# Patient Record
Sex: FEMALE | Race: BLACK OR AFRICAN AMERICAN | URBAN - METROPOLITAN AREA
[De-identification: names, ages, dates, MRNs, and addresses within clinical notes are randomized per-mention and may not be internally consistent; named-entity substitution may affect disease eponyms.]

---

## 2017-04-13 ENCOUNTER — OFFICE VISIT (OUTPATIENT)
Dept: ENDOCRINOLOGY | Age: 32
End: 2017-04-13

## 2017-04-13 VITALS
TEMPERATURE: 113 F | RESPIRATION RATE: 18 BRPM | HEART RATE: 113 BPM | HEIGHT: 64 IN | WEIGHT: 190.3 LBS | DIASTOLIC BLOOD PRESSURE: 90 MMHG | SYSTOLIC BLOOD PRESSURE: 122 MMHG | BODY MASS INDEX: 32.49 KG/M2

## 2017-04-13 DIAGNOSIS — E11.65 UNCONTROLLED TYPE 2 DIABETES MELLITUS WITH HYPERGLYCEMIA, WITH LONG-TERM CURRENT USE OF INSULIN (HCC): ICD-10-CM

## 2017-04-13 DIAGNOSIS — E11.65 TYPE 2 DIABETES MELLITUS WITH HYPERGLYCEMIA, WITH LONG-TERM CURRENT USE OF INSULIN (HCC): Primary | ICD-10-CM

## 2017-04-13 DIAGNOSIS — Z79.4 UNCONTROLLED TYPE 2 DIABETES MELLITUS WITH HYPERGLYCEMIA, WITH LONG-TERM CURRENT USE OF INSULIN (HCC): ICD-10-CM

## 2017-04-13 DIAGNOSIS — Z79.4 TYPE 2 DIABETES MELLITUS WITH HYPERGLYCEMIA, WITH LONG-TERM CURRENT USE OF INSULIN (HCC): Primary | ICD-10-CM

## 2017-04-13 LAB
GLUCOSE POC: 239 MG/DL
HBA1C MFR BLD HPLC: 6.1 %

## 2017-04-13 RX ORDER — INSULIN GLARGINE 100 [IU]/ML
INJECTION, SOLUTION SUBCUTANEOUS
Qty: 15 ML | Refills: 11 | Status: SHIPPED | OUTPATIENT
Start: 2017-04-13 | End: 2018-01-29 | Stop reason: SDUPTHER

## 2017-04-13 RX ORDER — PEN NEEDLE, DIABETIC 29 G X1/2"
NEEDLE, DISPOSABLE MISCELLANEOUS
Qty: 200 PEN NEEDLE | Refills: 11 | Status: SHIPPED | OUTPATIENT
Start: 2017-04-13 | End: 2018-03-22 | Stop reason: SDUPTHER

## 2017-04-13 RX ORDER — INSULIN LISPRO 100 [IU]/ML
INJECTION, SOLUTION INTRAVENOUS; SUBCUTANEOUS
Qty: 15 ML | Refills: 11 | Status: SHIPPED | OUTPATIENT
Start: 2017-04-13 | End: 2018-03-22 | Stop reason: SDUPTHER

## 2017-04-13 RX ORDER — METFORMIN HYDROCHLORIDE 1000 MG/1
TABLET ORAL
Qty: 60 TAB | Refills: 11 | Status: SHIPPED | OUTPATIENT
Start: 2017-04-13 | End: 2018-03-22 | Stop reason: SDUPTHER

## 2017-04-13 NOTE — PATIENT INSTRUCTIONS
Check blood sugars before meals and at bedtime. Maintain the log and bring it all your appointments      No insulin  If glucose is less than 70     If the bedtime sugars are less than 100 ,eat a 15 gm snack.     1800 Kcal diet , low carb , high protein    Avoid juices and sodas     Lantus insulin 30 units at bed time    Novolog or Humalog 6 units before breakfast , 6 units before  lunch and 5 units before dinner only         Additional Novolog or Humalog for high sugars with meals     150-200 mg   Add 2 units     201-250 mg   Add 4  units     251-300 mg   Add 6 units     301-350 mg   Add 8 units     351-400 mg   Add 10 units

## 2017-04-13 NOTE — PROGRESS NOTES
[facility] ENDOCRINOLOGY               Alfonso Greco MD        [address] 78 444 81 66 Fax [phone]  [facility]) [address] [address] [address] GF:[phone] Fax [phone] ( Monday)          Patient Information  Date:4/13/2017  Name : Meryl Hay 33 y.o.     YOB: 1985         Referred by:  Group home       Chief Complaint   Patient presents with    Diabetes       History of Present Illness: Meryl Hay is a 33 y.o. female here for fu of  Type 2 Diabetes Mellitus. No log this time  no hypoglycemia in AM ,   No weight loss instead gained weight   Some of her Psychotropic meds have been changed         She lives in a Group home due to underlying Psychiatric condition     Here with care taker , does not communicate  Monitoring frequency:3 /day         Wt Readings from Last 3 Encounters:   04/13/17 190 lb 4.8 oz (86.3 kg)   11/30/16 179 lb (81.2 kg)   08/31/16 179 lb (81.2 kg)       BP Readings from Last 3 Encounters:   04/13/17 95/74   11/30/16 116/78   08/31/16 118/85           Past Medical History:   Diagnosis Date    Autism     Diabetes ([address]     Intermittent explosive disorder      Current Outpatient Prescriptions   Medication Sig    metFORMIN (GLUCOPHAGE) 1,000 mg tablet TAKE 1 TABLET BY MOUTH IN THE MORNING AND IN THE EVENING WITH FOOD    busPIRone (BUSPAR) 10 mg tablet Take 10 mg by mouth as needed.  polyethylene glycol (MIRALAX) 17 gram packet Take 17 g by mouth daily.  benzoyl peroxide 5 % external liquid Apply  to affected area daily as needed.  minocycline (MINOCIN, DYNACIN) 50 mg capsule Take 50 mg by mouth daily.  ammonium lactate (LAC-HYDRIN) 12 % lotion Apply  to affected area two (2) times a day. rub in to affected area well    QUEtiapine SR (SEROQUEL XR) 300 mg sr tablet Take 400 mg by mouth nightly.  clomiPRAMINE (ANAFRANIL) 75 mg capsule Take 225 mg by mouth nightly.     insulin glargine (LANTUS SOLOSTAR) 100 unit/mL (3 mL) pen Inject 30 units at bed time    insulin lispro (HUMALOG) 100 unit/mL kwikpen Inject 6 units before breakfast , 6 units before lunch and 5 units before dinner only w/ SSI Max units daily: 47    Insulin Needles, Disposable, (BD INSULIN PEN NEEDLE UF ORIG) 29 gauge x 1/2\" ndle Use to inject insulin 4 times daily Dx Code: E11.65    cetirizine (ZYRTEC) 10 mg tablet Take 10 mg by mouth daily.  simvastatin (ZOCOR) 10 mg tablet Take 10 mg by mouth nightly.  divalproex DR (DEPAKOTE) 500 mg tablet Take 500 mg by mouth daily.  naltrexone (DEPADE) 50 mg tablet Take 50 mg by mouth daily.  risperiDONE (RISPERDAL) 3 mg tablet Take 4 mg by mouth two (2) times a day.  medroxyPROGESTERone (DEPO-PROVERA) 150 mg/mL injection 1 mL by IntraMUSCular route every three (3) months.  fluconazole (DIFLUCAN) 150 mg tablet Take 150 mg by mouth every thirty (30) days.  Cholecalciferol, Vitamin D3, (VITAMIN D3) 1,000 unit cap Take 1 Cap by mouth daily.  lubiPROStone (AMITIZA) 24 mcg capsule Take 24 mcg by mouth two (2) times daily (with meals).  clonazePAM (KLONOPIN) 2 mg tablet Take 2 mg by mouth three (3) times daily.  FLUoxetine (PROZAC) 20 mg capsule Take 20 mg by mouth daily. No current facility-administered medications for this visit. Allergies   Allergen Reactions    Other Plant, Animal, Environmental Sneezing         Review of Systems: unable to obtain due to underlying mental status  -     Physical Examination:   Blood pressure 95/74, pulse (!) 113, resp. rate 18, height 5' 4\" (1.626 m), weight 190 lb 4.8 oz (86.3 kg). Estimated body mass index is 32.66 kg/(m^2) as calculated from the following:    Height as of this encounter: 5' 4\" (1.626 m). -   Weight as of this encounter: 190 lb 4.8 oz (86.3 kg).   - General: no distress  - HEENT:  no periorbital edema,   - Neck: supple, no thyromegaly  - Cardiovascular: regular, normal rate, normal S1 and S2,   - Respiratory: clear to auscultation bilaterally  - Gastrointestinal: soft, nontender, nondistended,  BS +  - Neurological:alert and not oriented    Diabetic feet exam ;     H/o partial or complete amputation of foot : NO  H/o previous foot ulceration : NO  H/o pre - ulcerative callus : YES  H/o peripheral neuropathy and callus : No ( does not communicate ,so hard to assess )   H/o poor circulation  : NO  Foot deformity : yes bunion   Callus ,2/16   - Psychiatric: restless  - Skin: color, texture, turgor normal.       Data Reviewed:     [] Glucose records reviewed. [] See glucose records for details (to be scanned). [] A1C  [] Reviewed labs    Lab Results   Component Value Date/Time    Hemoglobin A1c 6.9 05/13/2016 10:54 AM    Hemoglobin A1c 7.4 02/17/2016 04:23 PM    Hemoglobin A1c 8.7 06/12/2015 02:50 PM    Glucose 90 05/13/2016 10:54 AM    Glucose  04/13/2017 11:51 AM    Microalb/Creat ratio (ug/mg creat.) <6.7 05/13/2016 10:54 AM    LDL, calculated 51 05/13/2016 10:54 AM    Creatinine 0.69 05/13/2016 10:54 AM    Hemoglobin A1c, External 8.7 09/24/2015    Hemoglobin A1c, External 8.0 09/22/2015      Lab Results   Component Value Date/Time    GFR est  05/13/2016 10:54 AM    GFR est non- 05/13/2016 10:54 AM    Creatinine 0.69 05/13/2016 10:54 AM    BUN 7 05/13/2016 10:54 AM    Sodium 142 05/13/2016 10:54 AM    Potassium 4.3 05/13/2016 10:54 AM    Chloride 101 05/13/2016 10:54 AM    CO2 17 05/13/2016 10:54 AM        Assessment/Plan:     1. Type 2 diabetes mellitus with hyperglycemia, with long-term current use of insulin ([facility])        1.  Type 2 Diabetes Mellitus   Lab Results   Component Value Date/Time    Hemoglobin A1c 6.9 05/13/2016 10:54 AM    Hemoglobin A1c (POC) 6.1 04/13/2017 11:51 AM    Hemoglobin A1c, External 8.7 09/24/2015    controlled    Metformin  Lantus insulin 30 units at bed time  Novolog or Humalog  with meals , breakfast ,lunch and dinner, only     Written instructions given  Advised to check glucose  4 times daily      2. HTN : was lower, improved with hydration , cannot tolerate ACE    3. Hyperlipidemia : Continue statin. 4.Obesity:Body mass index is 32.66 kg/(m^2). Diet managed by Group home and she is on a low carb diet           There are no Patient Instructions on file for this visit. Follow-up Disposition: Not on File    Thank you for allowing me to participate in the care of this patient.     Stella Green MD

## 2017-04-13 NOTE — MR AVS SNAPSHOT
Visit Information Date & Time Provider Department Dept. Phone Encounter #  
 4/13/2017 11:30 AM Vaishali Wallace MD [facility] Diabetes & Endocrinology [phone] [id number] Follow-up Instructions Return in about 3 months (around 7/13/2017). Upcoming Health Maintenance Date Due  
 EYE EXAM RETINAL OR DILATED Q1 10/24/1993 Pneumococcal 19-64 Medium Risk (1 of 1 - PPSV23) 10/24/2002 DTaP/Tdap/Td series (1 - Tdap) 10/24/2004 PAP AKA CERVICAL CYTOLOGY 10/24/2004 INFLUENZA AGE 9 TO ADULT 8/1/2016 FOOT EXAM Q1 12/29/2016 MICROALBUMIN Q1 5/13/2017 LIPID PANEL Q1 5/13/2017 HEMOGLOBIN A1C Q6M 5/30/2017 Allergies as of 4/13/2017  Review Complete On: 4/13/2017 By: Vaishali Wallace MD  
  
 Severity Noted Reaction Type Reactions Other Plant, Animal, Environmental  06/12/2015    Sneezing Current Immunizations  Never Reviewed No immunizations on file. Not reviewed this visit You Were Diagnosed With   
  
 Codes Comments Type 2 diabetes mellitus with hyperglycemia, with long-term current use of insulin (HCC)    -  Primary ICD-10-CM: E11.65, Z79.4 ICD-9-CM: 250.00, 790.29, V58.67 Vitals BP Pulse Resp Height(growth percentile) Weight(growth percentile) BMI  
 95/74 (BP 1 Location: Left arm, BP Patient Position: Sitting) (!) 113 18 5' 4\" (1.626 m) 190 lb 4.8 oz (86.3 kg) 32.66 kg/m2 OB Status Smoking Status Injection Never Smoker Vitals History BMI and BSA Data Body Mass Index Body Surface Area  
 32.66 kg/m 2 1.97 m 2 Preferred Pharmacy Pharmacy Name Phone [facility] [phone] [phone] Your Updated Medication List  
  
   
This list is accurate as of: 4/13/17 12:04 PM.  Always use your most recent med list.  
  
  
  
  
 AMITIZA 24 mcg capsule Generic drug:  lubiPROStone Take 24 mcg by mouth two (2) times daily (with meals). ammonium lactate 12 % lotion Commonly known as:  LAC-HYDRIN Apply  to affected area two (2) times a day. rub in to affected area well  
  
 benzoyl peroxide 5 % external liquid Apply  to affected area daily as needed. busPIRone 10 mg tablet Commonly known as:  BUSPAR Take 10 mg by mouth as needed. cetirizine 10 mg tablet Commonly known as:  ZYRTEC Take 10 mg by mouth daily. clomiPRAMINE 75 mg capsule Commonly known as:  ANAFRANIL Take 225 mg by mouth nightly. clonazePAM 2 mg tablet Commonly known as:  Strang Fabry Take 2 mg by mouth three (3) times daily. DEPO-PROVERA 150 mg/mL injection Generic drug:  medroxyPROGESTERone 1 mL by IntraMUSCular route every three (3) months. divalproex  mg tablet Commonly known as:  DEPAKOTE Take 500 mg by mouth daily. fluconazole 150 mg tablet Commonly known as:  DIFLUCAN Take 150 mg by mouth every thirty (30) days. FLUoxetine 20 mg capsule Commonly known as:  PROzac Take 20 mg by mouth daily. insulin glargine 100 unit/mL (3 mL) pen Commonly known as:  LANTUS SOLOSTAR Inject 30 units at bed time  
  
 insulin lispro 100 unit/mL kwikpen Commonly known as:  HUMALOG Inject 6 units before breakfast , 6 units before lunch and 5 units before dinner only w/ SSI Max units daily: 47 Insulin Needles (Disposable) 29 gauge x 1/2\" Ndle Commonly known as:  BD INSULIN PEN NEEDLE UF ORIG Use to inject insulin 4 times daily Dx Code: E11.65  
  
 metFORMIN 1,000 mg tablet Commonly known as:  GLUCOPHAGE  
TAKE 1 TABLET BY MOUTH IN THE MORNING AND IN THE EVENING WITH FOOD  
  
 minocycline 50 mg capsule Commonly known as:  Alverna Needs Take 50 mg by mouth daily. naltrexone 50 mg tablet Commonly known as:  DEPADE Take 50 mg by mouth daily. polyethylene glycol 17 gram packet Commonly known as:  Mary Limb Take 17 g by mouth daily. risperiDONE 3 mg tablet Commonly known as:  RisperDAL Take 4 mg by mouth two (2) times a day. SEROquel  mg sr tablet Generic drug:  QUEtiapine SR Take 400 mg by mouth nightly. simvastatin 10 mg tablet Commonly known as:  ZOCOR Take 10 mg by mouth nightly. VITAMIN D3 1,000 unit Cap Generic drug:  cholecalciferol Take 1 Cap by mouth daily. We Performed the Following AMB POC GLUCOSE, QUANTITATIVE, BLOOD [08786 CPT(R)] AMB POC HEMOGLOBIN A1C [85929 CPT(R)] Follow-up Instructions Return in about 3 months (around 7/13/2017). Patient Instructions Check blood sugars before meals and at bedtime. Maintain the log and bring it all your appointments No insulin  If glucose is less than 70 If the bedtime sugars are less than 100 ,eat a 15 gm snack. 1800 Kcal diet , low carb , high protein Avoid juices and sodas Lantus insulin 30 units at bed time Novolog or Humalog 6 units before breakfast , 6 units before  lunch and 5 units before dinner only Additional Novolog or Humalog for high sugars with meals 150-200 mg   Add 2 units 201-250 mg   Add 4  units 251-300 mg   Add 6 units 301-350 mg   Add 8 units 351-400 mg   Add 10 units Introducing hospitals & HEALTH SERVICES! Deon Myers introduces [employer] patient portal. Now you can access parts of your medical record, email your doctor's office, and request medication refills online. 1. In your internet browser, go to [email or web address] 2. Click on the First Time User? Click Here link in the Sign In box. You will see the New Member Sign Up page. 3. Enter your [employer] Access Code exactly as it appears below. You will not need to use this code after youve completed the sign-up process. If you do not sign up before the expiration date, you must request a new code. · [employer] Access Code: [email or web address] Expires: 7/12/2017 12:04 PM 
 
 4. Enter the last four digits of your Social Security Number (xxxx) and Date of Birth (mm/dd/yyyy) as indicated and click Submit. You will be taken to the next sign-up page. 5. Create a [employer] ID. This will be your [employer] login ID and cannot be changed, so think of one that is secure and easy to remember. 6. Create a [employer] password. You can change your password at any time. 7. Enter your Password Reset Question and Answer. This can be used at a later time if you forget your password. 8. Enter your e-mail address. You will receive e-mail notification when new information is available in [address] 9. Click Sign Up. You can now view and download portions of your medical record. 10. Click the Download Summary menu link to download a portable copy of your medical information. If you have questions, please visit the Frequently Asked Questions section of the [employer] website. Remember, [employer] is NOT to be used for urgent needs. For medical emergencies, dial 911. Now available from your iPhone and Android! Please provide this summary of care documentation to your next provider. Your primary care clinician is listed as Emiliano Springer. If you have any questions after today's visit, please call [phone].

## 2017-04-13 NOTE — PROGRESS NOTES
Kimberly Akbar is a [address] jo[place]. female here for   Chief Complaint   Patient presents with    Diabetes       Functional glucose monitor and record keeping system? - yes/no  Eye exam within last year? - yes/no  Foot exam within last year? - yes/no    Lab Results   Component Value Date/Time    Hemoglobin A1c 6.9 05/13/2016 10:54 AM    Hemoglobin A1c (POC) 5.3 11/30/2016 03:14 PM    Hemoglobin A1c, External 8.7 09/24/2015       Wt Readings from Last 3 Encounters:   11/30/16 179 lb (81.2 kg)   08/31/16 179 lb (81.2 kg)   05/20/16 182 lb 6.4 oz (82.7 kg)     Temp Readings from Last 3 Encounters:   08/31/16 97 °F (36.1 °C)   05/20/16 98 °F (36.7 °C) (Oral)   02/19/16 98.3 °F (36.8 °C) (Oral)     BP Readings from Last 3 Encounters:   11/30/16 116/78   08/31/16 118/85   05/20/16 102/78     Pulse Readings from Last 3 Encounters:   11/30/16 96   08/31/16 (!) 113   05/20/16 (!) 101

## 2017-07-13 ENCOUNTER — OFFICE VISIT (OUTPATIENT)
Dept: ENDOCRINOLOGY | Age: 32
End: 2017-07-13

## 2017-07-13 VITALS
TEMPERATURE: 98.2 F | SYSTOLIC BLOOD PRESSURE: 110 MMHG | HEIGHT: 64 IN | HEART RATE: 113 BPM | BODY MASS INDEX: 33.26 KG/M2 | DIASTOLIC BLOOD PRESSURE: 72 MMHG | RESPIRATION RATE: 18 BRPM | WEIGHT: 194.8 LBS

## 2017-07-13 DIAGNOSIS — Z79.4 TYPE 2 DIABETES MELLITUS WITH HYPERGLYCEMIA, WITH LONG-TERM CURRENT USE OF INSULIN (HCC): Primary | ICD-10-CM

## 2017-07-13 DIAGNOSIS — E11.65 TYPE 2 DIABETES MELLITUS WITH HYPERGLYCEMIA, WITH LONG-TERM CURRENT USE OF INSULIN (HCC): Primary | ICD-10-CM

## 2017-07-13 DIAGNOSIS — E78.2 MIXED HYPERLIPIDEMIA: ICD-10-CM

## 2017-07-13 NOTE — PROGRESS NOTES
Hero Phipps MD             Patient Information  Date:7/13/2017  Name : Rosita Baldwin 33 y.o.     YOB: 1985         Referred by:  Group home       Chief Complaint   Patient presents with    Diabetes       History of Present Illness: Rosita Baldwin is a 33 y.o. female here for fu of  Type 2 Diabetes Mellitus. Reviewed log   Hypoglycemia - twice since last visit - no known reason  She was getting Humalog even at bedtime in the past - educated staff      She lives in a Group home due to underlying Psychiatric condition     Here with care taker , does not communicate  Monitoring frequency:3 /day         Wt Readings from Last 3 Encounters:   07/13/17 194 lb 12.8 oz (88.4 kg)   04/13/17 190 lb 4.8 oz (86.3 kg)   11/30/16 179 lb (81.2 kg)       BP Readings from Last 3 Encounters:   07/13/17 110/72   04/13/17 122/90   11/30/16 116/78           Past Medical History:   Diagnosis Date    Autism     Diabetes ([facility] [address]     Intermittent explosive disorder      Current Outpatient Prescriptions   Medication Sig    metFORMIN (GLUCOPHAGE) 1,000 mg tablet TAKE 1 TABLET BY MOUTH IN THE MORNING AND IN THE EVENING WITH FOOD    Insulin Needles, Disposable, (BD INSULIN PEN NEEDLE UF ORIG) 29 gauge x 1/2\" ndle Use to inject insulin 4 times daily Dx Code: E11.65    insulin lispro (HUMALOG) 100 unit/mL kwikpen Inject 6 units before breakfast , 6 units before lunch and 5 units before dinner only w/ SSI Max units daily: 47    insulin glargine (LANTUS SOLOSTAR) 100 unit/mL (3 mL) pen Inject 30 units at bed time    polyethylene glycol (MIRALAX) 17 gram packet Take 17 g by mouth daily.  benzoyl peroxide 5 % external liquid Apply  to affected area daily as needed.  minocycline (MINOCIN, DYNACIN) 50 mg capsule Take 50 mg by mouth daily.  ammonium lactate (LAC-HYDRIN) 12 % lotion Apply  to affected area two (2) times a day.  rub in to affected area well  QUEtiapine SR (SEROQUEL XR) 300 mg sr tablet Take 400 mg by mouth nightly.  clomiPRAMINE (ANAFRANIL) 75 mg capsule Take 225 mg by mouth nightly.  cetirizine (ZYRTEC) 10 mg tablet Take 10 mg by mouth daily.  simvastatin (ZOCOR) 10 mg tablet Take 10 mg by mouth nightly.  divalproex DR (DEPAKOTE) 500 mg tablet Take 500 mg by mouth daily.  naltrexone (DEPADE) 50 mg tablet Take 50 mg by mouth daily.  risperiDONE (RISPERDAL) 3 mg tablet Take 4 mg by mouth two (2) times a day.  medroxyPROGESTERone (DEPO-PROVERA) 150 mg/mL injection 1 mL by IntraMUSCular route every three (3) months.  fluconazole (DIFLUCAN) 150 mg tablet Take 150 mg by mouth every thirty (30) days.  Cholecalciferol, Vitamin D3, (VITAMIN D3) 1,000 unit cap Take 1 Cap by mouth daily.  lubiPROStone (AMITIZA) 24 mcg capsule Take 24 mcg by mouth two (2) times daily (with meals).  busPIRone (BUSPAR) 10 mg tablet Take 10 mg by mouth as needed.  clonazePAM (KLONOPIN) 2 mg tablet Take 2 mg by mouth three (3) times daily.  FLUoxetine (PROZAC) 20 mg capsule Take 20 mg by mouth daily. No current facility-administered medications for this visit. Allergies   Allergen Reactions    Other Plant, Animal, Environmental Sneezing         Review of Systems: unable to obtain due to underlying mental status  -     Physical Examination:   Blood pressure 110/72, pulse (!) 113, temperature 98.2 °F (36.8 °C), temperature source Axillary, resp. rate 18, height 5' 4\" (1.626 m), weight 194 lb 12.8 oz (88.4 kg). Estimated body mass index is 33.44 kg/(m^2) as calculated from the following:    Height as of this encounter: 5' 4\" (1.626 m). -   Weight as of this encounter: 194 lb 12.8 oz (88.4 kg).   - General: no distress  - HEENT:  no periorbital edema,   - Neck: supple, no thyromegaly  - Cardiovascular: regular, normal rate, normal S1 and S2,   - Respiratory: clear to auscultation bilaterally  - Gastrointestinal: soft, nontender, nondistended,  BS +  - Neurological:alert and not oriented    Diabetic feet exam ;     H/o partial or complete amputation of foot : NO  H/o previous foot ulceration : NO  H/o pre - ulcerative callus : YES  H/o peripheral neuropathy and callus : No ( does not communicate ,so hard to assess )   H/o poor circulation  : NO  Foot deformity : yes bunion   Callus    - Psychiatric: restless  - Skin: color, texture, turgor normal.       Data Reviewed:     [] Glucose records reviewed. [] See glucose records for details (to be scanned). [] A1C  [] Reviewed labs    Lab Results   Component Value Date/Time    Hemoglobin A1c 6.9 05/13/2016 10:54 AM    Hemoglobin A1c 7.4 02/17/2016 04:23 PM    Hemoglobin A1c 8.7 06/12/2015 02:50 PM    Glucose 90 05/13/2016 10:54 AM    Glucose  04/13/2017 11:51 AM    Microalb/Creat ratio (ug/mg creat.) <6.7 05/13/2016 10:54 AM    LDL, calculated 51 05/13/2016 10:54 AM    Creatinine 0.69 05/13/2016 10:54 AM    Hemoglobin A1c, External 8.7 09/24/2015    Hemoglobin A1c, External 8.0 09/22/2015      Lab Results   Component Value Date/Time    GFR est  05/13/2016 10:54 AM    GFR est non- 05/13/2016 10:54 AM    Creatinine 0.69 05/13/2016 10:54 AM    BUN 7 05/13/2016 10:54 AM    Sodium 142 05/13/2016 10:54 AM    Potassium 4.3 05/13/2016 10:54 AM    Chloride 101 05/13/2016 10:54 AM    CO2 17 05/13/2016 10:54 AM        Assessment/Plan:     1. Type 2 diabetes mellitus with hyperglycemia, with long-term current use of insulin ([facility] [address]    2. Mixed hyperlipidemia        1. Type 2 Diabetes Mellitus   Lab Results   Component Value Date/Time    Hemoglobin A1c 6.9 05/13/2016 10:54 AM    Hemoglobin A1c (POC) 6.1 04/13/2017 11:51 AM    Hemoglobin A1c, External 8.7 09/24/2015    controlled    Metformin  Lantus insulin 30 units at bed time  Novolog or Humalog  with meals , breakfast ,lunch and dinner, only     Written instructions given  Advised to check glucose  4 times daily      2.   HTN : was lower, improved with hydration , cannot tolerate ACE    3. Hyperlipidemia : Continue statin. 4.Obesity:Body mass index is 33.44 kg/(m^2). Diet managed by Group home and she is on a low carb diet           There are no Patient Instructions on file for this visit. Follow-up Disposition: Not on File    Thank you for allowing me to participate in the care of this patient.     Cesar Schwartz MD

## 2017-07-13 NOTE — PROGRESS NOTES
Leobardo Gibson is a 33 y.o. female here for   Chief Complaint   Patient presents with    Diabetes       Functional glucose monitor and record keeping system? - yes  Eye exam within last year? - unknown   Foot exam within last year? - unknown    1. Have you been to the ER, urgent care clinic since your last visit? Hospitalized since your last visit? - no    2. Have you seen or consulted any other health care providers outside of the [address] [place] [address] since your last visit?   Include any pap smears or colon screening.- PCP      Lab Results   Component Value Date/Time    Hemoglobin A1c 6.9 05/13/2016 10:54 AM    Hemoglobin A1c (POC) 6.1 04/13/2017 11:51 AM    Hemoglobin A1c, External 8.7 09/24/2015       Wt Readings from Last 3 Encounters:   04/13/17 190 lb 4.8 oz (86.3 kg)   11/30/16 179 lb (81.2 kg)   08/31/16 179 lb (81.2 kg)     Temp Readings from Last 3 Encounters:   04/13/17 (!) 113 °F (45 °C)   08/31/16 97 °F (36.1 °C)   05/20/16 98 °F (36.7 °C) (Oral)     BP Readings from Last 3 Encounters:   04/13/17 122/90   11/30/16 116/78   08/31/16 118/85     Pulse Readings from Last 3 Encounters:   04/13/17 (!) 113   11/30/16 96   08/31/16 (!) 113

## 2017-07-13 NOTE — MR AVS SNAPSHOT
Visit Information Date & Time Provider Department Dept. Phone Encounter #  
 7/13/2017 11:30 AM Chana Carranza MD [facility] [phone] [id number] Follow-up Instructions Return in about 3 months (around 10/13/2017). Your Appointments 7/13/2017 11:30 AM  
ROUTINE CARE with Chana Carranza MD  
[facility]) Appt Note: 3mo fu dm  
 [address] [facility] [address]  
[phone]  
  
   
 [address] [place] [id number] Upcoming Health Maintenance Date Due  
 EYE EXAM RETINAL OR DILATED Q1 10/24/1993 Pneumococcal 19-64 Medium Risk (1 of 1 - PPSV23) 10/24/2002 DTaP/Tdap/Td series (1 - Tdap) 10/24/2004 PAP AKA CERVICAL CYTOLOGY 10/24/2004 FOOT EXAM Q1 12/29/2016 MICROALBUMIN Q1 5/13/2017 LIPID PANEL Q1 5/13/2017 INFLUENZA AGE 9 TO ADULT 8/1/2017 HEMOGLOBIN A1C Q6M 10/13/2017 Allergies as of 7/13/2017  Review Complete On: 7/13/2017 By: Chana Carranza MD  
  
 Severity Noted Reaction Type Reactions Other Plant, Animal, Environmental  06/12/2015    Sneezing Current Immunizations  Never Reviewed No immunizations on file. Not reviewed this visit You Were Diagnosed With   
  
 Codes Comments Type 2 diabetes mellitus with hyperglycemia, with long-term current use of insulin (HCC)    -  Primary ICD-10-CM: E11.65, Z79.4 ICD-9-CM: 250.00, 790.29, V58.67 Mixed hyperlipidemia     ICD-10-CM: E78.2 ICD-9-CM: 272.2 Vitals BP Pulse Temp Resp Height(growth percentile) 110/72 (BP 1 Location: Left arm, BP Patient Position: Sitting) (!) 113 98.2 °F (36.8 °C) (Axillary) 18 5' 4\" (1.626 m) Weight(growth percentile) BMI OB Status Smoking Status 194 lb 12.8 oz (88.4 kg) 33.44 kg/m2 Injection Never Smoker BMI and BSA Data Body Mass Index Body Surface Area  
 33.44 kg/m 2 2 m 2 Preferred Pharmacy Pharmacy Name Phone [facility] [address] [phone] Your Updated Medication List  
  
   
This list is accurate as of: 7/13/17 11:22 AM.  Always use your most recent med list.  
  
  
  
  
 AMITIZA 24 mcg capsule Generic drug:  lubiPROStone Take 24 mcg by mouth two (2) times daily (with meals). ammonium lactate 12 % lotion Commonly known as:  LAC-HYDRIN Apply  to affected area two (2) times a day. rub in to affected area well  
  
 benzoyl peroxide 5 % external liquid Apply  to affected area daily as needed. busPIRone 10 mg tablet Commonly known as:  BUSPAR Take 10 mg by mouth as needed. cetirizine 10 mg tablet Commonly known as:  ZYRTEC Take 10 mg by mouth daily. clomiPRAMINE 75 mg capsule Commonly known as:  ANAFRANIL Take 225 mg by mouth nightly. clonazePAM 2 mg tablet Commonly known as:  [place] Panda Take 2 mg by mouth three (3) times daily. DEPO-PROVERA 150 mg/mL injection Generic drug:  medroxyPROGESTERone 1 mL by IntraMUSCular route every three (3) months. divalproex  mg tablet Commonly known as:  DEPAKOTE Take 500 mg by mouth daily. fluconazole 150 mg tablet Commonly known as:  DIFLUCAN Take 150 mg by mouth every thirty (30) days. FLUoxetine 20 mg capsule Commonly known as:  PROzac Take 20 mg by mouth daily. insulin glargine 100 unit/mL (3 mL) Inpn Commonly known as:  LANTUS SOLOSTAR Inject 30 units at bed time  
  
 insulin lispro 100 unit/mL kwikpen Commonly known as:  HUMALOG Inject 6 units before breakfast , 6 units before lunch and 5 units before dinner only w/ SSI Max units daily: 47 Insulin Needles (Disposable) 29 gauge x 1/2\" Ndle Commonly known as:  BD INSULIN PEN NEEDLE UF ORIG Use to inject insulin 4 times daily Dx Code: E11.65  
  
 metFORMIN 1,000 mg tablet Commonly known as:  GLUCOPHAGE  
 TAKE 1 TABLET BY MOUTH IN THE MORNING AND IN THE EVENING WITH FOOD  
  
 minocycline 50 mg capsule Commonly known as:  Stefano Mari Take 50 mg by mouth daily. naltrexone 50 mg tablet Commonly known as:  DEPADE Take 50 mg by mouth daily. polyethylene glycol 17 gram packet Commonly known as:  Vernel Redder Take 17 g by mouth daily. risperiDONE 3 mg tablet Commonly known as:  RisperDAL Take 4 mg by mouth two (2) times a day. SEROquel  mg sr tablet Generic drug:  QUEtiapine SR Take 400 mg by mouth nightly. simvastatin 10 mg tablet Commonly known as:  ZOCOR Take 10 mg by mouth nightly. VITAMIN D3 1,000 unit Cap Generic drug:  cholecalciferol Take 1 Cap by mouth daily. Follow-up Instructions Return in about 3 months (around 10/13/2017). Patient Instructions Check blood sugars before meals and at bedtime. Maintain the log and bring it all your appointments No insulin  If glucose is less than 70 If she has low glucose which is less than 70 - give her 4 oz juice and recheck every 15 minutes until it is more than 100 If the bedtime sugars are less than 100 ,eat a 15 gm snack. 1800 Kcal diet , low carb , high protein Avoid juices and sodas Lantus insulin 30 units at bed time Novolog or Humalog 6 units before breakfast , 6 units before  lunch and 5 units before dinner only No humalog or Novolog at bedtime Additional Novolog or Humalog for high sugars with meals 150-200 mg   Add 2 units 201-250 mg   Add 4  units 251-300 mg   Add 6 units 301-350 mg   Add 8 units 351-400 mg   Add 10 units If sugars are more than 450 then take her to [facility] Introducing [facility]! Maria R Cruz introduces [employer] patient portal. Now you can access parts of your medical record, email your doctor's office, and request medication refills online. 1. In your internet browser, go to [email or web address] 2. Click on the First Time User? Click Here link in the Sign In box. You will see the New Member Sign Up page. 3. Enter your [employer] Access Code exactly as it appears below. You will not need to use this code after youve completed the sign-up process. If you do not sign up before the expiration date, you must request a new code. · [employer] Access Code: [email or web address] Expires: 10/11/2017 11:22 AM 
 
4. Enter the last four digits of your Social Security Number (xxxx) and Date of Birth (mm/dd/yyyy) as indicated and click Submit. You will be taken to the next sign-up page. 5. Create a Moximedt ID. This will be your [employer] login ID and cannot be changed, so think of one that is secure and easy to remember. 6. Create a [employer] password. You can change your password at any time. 7. Enter your Password Reset Question and Answer. This can be used at a later time if you forget your password. 8. Enter your e-mail address. You will receive e-mail notification when new information is available in [address] 9. Click Sign Up. You can now view and download portions of your medical record. 10. Click the Download Summary menu link to download a portable copy of your medical information. If you have questions, please visit the Frequently Asked Questions section of the [employer] website. Remember, [employer] is NOT to be used for urgent needs. For medical emergencies, dial 911. Now available from your iPhone and Android! Please provide this summary of care documentation to your next provider. Your primary care clinician is listed as Monica Contreras. If you have any questions after today's visit, please call [phone].

## 2017-07-13 NOTE — PATIENT INSTRUCTIONS
Check blood sugars before meals and at bedtime. Maintain the log and bring it all your appointments      No insulin  If glucose is less than 70     If she has low glucose which is less than 70 - give her 4 oz juice and recheck every 15 minutes until it is more than 100     If the bedtime sugars are less than 100 ,eat a 15 gm snack.     1800 Kcal diet , low carb , high protein    Avoid juices and sodas     Lantus insulin 30 units at bed time    Novolog or Humalog 6 units before breakfast , 6 units before  lunch and 5 units before dinner only   No humalog or Novolog at bedtime        Additional Novolog or Humalog for high sugars with meals     150-200 mg   Add 2 units     201-250 mg   Add 4  units     251-300 mg   Add 6 units     301-350 mg   Add 8 units     351-400 mg   Add 10 units         If sugars are more than 450 [facility]

## 2017-10-20 ENCOUNTER — TELEPHONE (OUTPATIENT)
Dept: ENDOCRINOLOGY | Age: 32
End: 2017-10-20

## 2017-10-20 NOTE — TELEPHONE ENCOUNTER
Leopold Custard called from group home and stated patient's blood sugars have been better. However, the blood sugar is dropping during the night. Patient is given a snack at 2100 and blood sugar checked and given 30 uits of Lantus. Approximately around 1230 am the staff checks blood sugar and it is ranging from . They give patient another snack. FBS are ranging 70-80 with one dropping below 70- it was 67 on 10/10. FBS this morning was 72. They are asking should changes be made. Please advise.

## 2017-10-20 NOTE — TELEPHONE ENCOUNTER
Spoke with Ms Thuan Maxwell and Verified they are giving Lantus at bedtime and not humalog. She stated it is lantus. Informed her to decrease lantus dose to 24 units at bedtime. Verbalized understanding. Patient has follow up on 10/25 and they will bring blood sugar logs to appointment.

## 2017-10-20 NOTE — TELEPHONE ENCOUNTER
Please make sure they are not giving Humalog at bedtime - they were doing it in the past by accident    Humalog is only before meals      If thye are not giving Humalog ta betdtim freida decraese lantus to 24 units

## 2017-10-25 ENCOUNTER — OFFICE VISIT (OUTPATIENT)
Dept: ENDOCRINOLOGY | Age: 32
End: 2017-10-25

## 2017-10-25 VITALS
OXYGEN SATURATION: 99 % | RESPIRATION RATE: 16 BRPM | WEIGHT: 209.7 LBS | HEART RATE: 75 BPM | SYSTOLIC BLOOD PRESSURE: 98 MMHG | DIASTOLIC BLOOD PRESSURE: 71 MMHG | BODY MASS INDEX: 35.8 KG/M2 | HEIGHT: 64 IN

## 2017-10-25 DIAGNOSIS — E11.42 DIABETIC PERIPHERAL NEUROPATHY (HCC): ICD-10-CM

## 2017-10-25 DIAGNOSIS — E78.2 MIXED HYPERLIPIDEMIA: ICD-10-CM

## 2017-10-25 DIAGNOSIS — E11.65 TYPE 2 DIABETES MELLITUS WITH HYPERGLYCEMIA, WITH LONG-TERM CURRENT USE OF INSULIN (HCC): Primary | ICD-10-CM

## 2017-10-25 DIAGNOSIS — Z79.4 TYPE 2 DIABETES MELLITUS WITH HYPERGLYCEMIA, WITH LONG-TERM CURRENT USE OF INSULIN (HCC): Primary | ICD-10-CM

## 2017-10-25 LAB
GLUCOSE POC: 149 MG/DL
HBA1C MFR BLD HPLC: 7.6 %

## 2017-10-25 RX ORDER — LORAZEPAM 1 MG/1
1 TABLET ORAL 2 TIMES DAILY
COMMUNITY

## 2017-10-25 NOTE — PROGRESS NOTES
Shyam Hewitt is a 27 y.o. female here for   Chief Complaint   Patient presents with    Diabetes       Functional glucose monitor and record keeping system? - yes  Eye exam within last year? - unknown  Foot exam within last year? - unknown    1. Have you been to the ER, urgent care clinic since your last visit? Hospitalized since your last visit? -no    2. Have you seen or consulted any other health care providers outside of the [address] [place] [address] since your last visit? Include any pap smears or colon screening. -PCP      Lab Results   Component Value Date/Time    Hemoglobin A1c 6.9 05/13/2016 10:54 AM    Hemoglobin A1c (POC) 6.1 04/13/2017 11:51 AM    Hemoglobin A1c, External 8.7 09/24/2015       Wt Readings from Last 3 Encounters:   07/13/17 194 lb 12.8 oz (88.4 kg)   04/13/17 190 lb 4.8 oz (86.3 kg)   11/30/16 179 lb (81.2 kg)     Temp Readings from Last 3 Encounters:   07/13/17 98.2 °F (36.8 °C) (Axillary)   04/13/17 (!) 113 °F (45 °C)   08/31/16 97 °F (36.1 °C)     BP Readings from Last 3 Encounters:   07/13/17 110/72   04/13/17 122/90   11/30/16 116/78     Pulse Readings from Last 3 Encounters:   07/13/17 (!) 113   04/13/17 (!) 113   11/30/16 96

## 2017-10-25 NOTE — PROGRESS NOTES
Jaya Valentine MD             Patient Information  Date:10/25/2017  Name : David Ruiz 27 y.o.     YOB: 1985         Referred by:  Group home       Chief Complaint   Patient presents with    Diabetes       History of Present Illness: David Ruiz is a 27 y.o. female here for fu of  Type 2 Diabetes Mellitus. Reviewed log   Hypoglycemia - at bedtime , eats well   Some of the staff are feeding her more carbs and they are spikes some days       She lives in a Group home due to underlying Psychiatric condition     Here with care taker , does not communicate  Monitoring frequency:3 /day         Wt Readings from Last 3 Encounters:   10/25/17 209 lb 11.2 oz (95.1 kg)   07/13/17 194 lb 12.8 oz (88.4 kg)   04/13/17 190 lb 4.8 oz (86.3 kg)       BP Readings from Last 3 Encounters:   10/25/17 98/71   07/13/17 110/72   04/13/17 122/90           Past Medical History:   Diagnosis Date    Autism     [facility]     Intermittent explosive disorder      Current Outpatient Prescriptions   Medication Sig    LORazepam (ATIVAN) 1 mg tablet Take 1 mg by mouth as needed for Anxiety.  metFORMIN (GLUCOPHAGE) 1,000 mg tablet TAKE 1 TABLET BY MOUTH IN THE MORNING AND IN THE EVENING WITH FOOD    Insulin Needles, Disposable, (BD INSULIN PEN NEEDLE UF ORIG) 29 gauge x 1/2\" ndle Use to inject insulin 4 times daily Dx Code: E11.65    insulin lispro (HUMALOG) 100 unit/mL kwikpen Inject 6 units before breakfast , 6 units before lunch and 5 units before dinner only w/ SSI Max units daily: 47    insulin glargine (LANTUS SOLOSTAR) 100 unit/mL (3 mL) pen Inject 30 units at bed time    polyethylene glycol (MIRALAX) 17 gram packet Take 17 g by mouth as needed.  benzoyl peroxide 5 % external liquid Apply  to affected area daily as needed.  ammonium lactate (LAC-HYDRIN) 12 % lotion Apply  to affected area two (2) times a day.  rub in to affected area well    QUEtiapine SR (SEROQUEL XR) 300 mg sr tablet Take 400 mg by mouth nightly.  clomiPRAMINE (ANAFRANIL) 75 mg capsule Take 225 mg by mouth nightly.  cetirizine (ZYRTEC) 10 mg tablet Take 10 mg by mouth daily.  simvastatin (ZOCOR) 10 mg tablet Take 10 mg by mouth nightly.  divalproex DR (DEPAKOTE) 500 mg tablet Take 1,000 mg by mouth daily.  risperiDONE (RISPERDAL) 3 mg tablet Take 4 mg by mouth two (2) times a day.  medroxyPROGESTERone (DEPO-PROVERA) 150 mg/mL injection 1 mL by IntraMUSCular route every three (3) months.  fluconazole (DIFLUCAN) 150 mg tablet Take 150 mg by mouth every thirty (30) days.  Cholecalciferol, Vitamin D3, (VITAMIN D3) 1,000 unit cap Take 1 Cap by mouth daily.  lubiPROStone (AMITIZA) 24 mcg capsule Take 24 mcg by mouth two (2) times daily (with meals).  busPIRone (BUSPAR) 10 mg tablet Take 10 mg by mouth as needed.  minocycline (MINOCIN, DYNACIN) 50 mg capsule Take 50 mg by mouth daily.  clonazePAM (KLONOPIN) 2 mg tablet Take 2 mg by mouth three (3) times daily.  naltrexone (DEPADE) 50 mg tablet Take 50 mg by mouth daily.  FLUoxetine (PROZAC) 20 mg capsule Take 20 mg by mouth daily. No current facility-administered medications for this visit. Allergies   Allergen Reactions    Other Plant, Animal, Environmental Sneezing         Review of Systems: unable to obtain due to underlying mental status  -     Physical Examination:   Blood pressure 98/71, pulse 75, resp. rate 16, height 5' 4\" (1.626 m), weight 209 lb 11.2 oz (95.1 kg), SpO2 99 %. Estimated body mass index is 35.99 kg/(m^2) as calculated from the following:    Height as of this encounter: 5' 4\" (1.626 m). -   Weight as of this encounter: 209 lb 11.2 oz (95.1 kg).   - General: no distress  - HEENT:  no periorbital edema,   - Neck: supple, no thyromegaly  - Cardiovascular: regular, normal rate, normal S1 and S2,   - Respiratory: clear to auscultation bilaterally  - Gastrointestinal: soft, nontender, nondistended,  BS +  - Neurological:alert and not oriented    Diabetic feet exam ;     H/o partial or complete amputation of foot : NO  H/o previous foot ulceration : NO  H/o pre - ulcerative callus : YES  H/o peripheral neuropathy and callus : No ( does not communicate ,so hard to assess )   H/o poor circulation  : NO  Foot deformity : yes bunion   Callus    - Psychiatric: restless  - Skin: color, texture, turgor normal.       Data Reviewed:     [] Glucose records reviewed. [] See glucose records for details (to be scanned). [] A1C  [] Reviewed labs    Lab Results   Component Value Date/Time    Hemoglobin A1c 6.9 05/13/2016 10:54 AM    Hemoglobin A1c 7.4 02/17/2016 04:23 PM    Hemoglobin A1c 8.7 06/12/2015 02:50 PM    Glucose 90 05/13/2016 10:54 AM    Glucose  10/25/2017 11:35 AM    Microalb/Creat ratio (ug/mg creat.) <6.7 05/13/2016 10:54 AM    LDL, calculated 51 05/13/2016 10:54 AM    Creatinine 0.69 05/13/2016 10:54 AM    Hemoglobin A1c, External 8.7 09/24/2015    Hemoglobin A1c, External 8.0 09/22/2015      Lab Results   Component Value Date/Time    GFR est  05/13/2016 10:54 AM    GFR est non- 05/13/2016 10:54 AM    Creatinine 0.69 05/13/2016 10:54 AM    BUN 7 05/13/2016 10:54 AM    Sodium 142 05/13/2016 10:54 AM    Potassium 4.3 05/13/2016 10:54 AM    Chloride 101 05/13/2016 10:54 AM    CO2 17 05/13/2016 10:54 AM        Assessment/Plan:     1. Type 2 diabetes mellitus with hyperglycemia, with long-term current use of insulin ([address]    2. Mixed hyperlipidemia        1.  Type 2 Diabetes Mellitus   Lab Results   Component Value Date/Time    Hemoglobin A1c 6.9 05/13/2016 10:54 AM    Hemoglobin A1c (POC) 7.6 10/25/2017 11:36 AM    Hemoglobin A1c, External 8.7 09/24/2015    controlled    Metformin  Lantus insulin 24 units at bed time  Novolog or Humalog  with meals , breakfast ,lunch and no fixed prandial insulin at dinner    Written instructions given  Advised to check glucose  4 times daily      2. HTN : was lower, improved with hydration , cannot tolerate ACE    3. Hyperlipidemia : Continue statin. 4.Obesity:Body mass index is 35.99 kg/(m^2). Diet managed by Group home and she is on a low carb diet           Patient Instructions   Check blood sugars before meals and at bedtime. Maintain the log and bring it all your appointments      No insulin  If glucose is less than 70     If she has low glucose which is less than 70 - give her 4 oz juice and recheck every 15 minutes until it is more than 100     If the bedtime sugars are less than 100 ,eat a 15 gm snack. 1800 Kcal diet , low carb , high protein    Avoid juices and sodas     Lantus insulin 24 units at bed time, if she is still having low sugars in the middle of the night then we have to decrease 20 units of lantus     Novolog or Humalog 6 units before breakfast , 6 units before  lunch and 0 units before dinner only   No humalog or Novolog at bedtime        Additional Novolog or Humalog for high sugars with meals     150-200 mg   Add 2 units     201-250 mg   Add 4  units     251-300 mg   Add 6 units     301-350 mg   Add 8 units     351-400 mg   Add 10 units         If sugars are more than 450 then take her to [facility]    Follow-up Disposition:  Return in about 3 months (around 1/25/2018). Thank you for allowing me to participate in the care of this patient.     Celine Moctezuma MD

## 2017-10-25 NOTE — MR AVS SNAPSHOT
Visit Information Date & Time Provider Department Dept. Phone Encounter #  
 10/25/2017 11:30 AM Derrick Adair MD [facility] [phone] [id number] Follow-up Instructions Return in about 3 months (around 1/25/2018). Upcoming Health Maintenance Date Due  
 EYE EXAM RETINAL OR DILATED Q1 10/24/1993 Pneumococcal 19-64 Medium Risk (1 of 1 - PPSV23) 10/24/2002 DTaP/Tdap/Td series (1 - Tdap) 10/24/2004 PAP AKA CERVICAL CYTOLOGY 10/24/2004 FOOT EXAM Q1 12/29/2016 MICROALBUMIN Q1 5/13/2017 LIPID PANEL Q1 5/13/2017 INFLUENZA AGE 9 TO ADULT 8/1/2017 HEMOGLOBIN A1C Q6M 10/13/2017 Allergies as of 10/25/2017  Review Complete On: 10/25/2017 By: Amelia Lyons LPN Severity Noted Reaction Type Reactions Other Plant, Animal, Environmental  06/12/2015    Sneezing Current Immunizations  Never Reviewed No immunizations on file. Not reviewed this visit You Were Diagnosed With   
  
 Codes Comments Type 2 diabetes mellitus with hyperglycemia, with long-term current use of insulin (HCC)    -  Primary ICD-10-CM: E11.65, Z79.4 ICD-9-CM: 250.00, 790.29, V58.67 Mixed hyperlipidemia     ICD-10-CM: E78.2 ICD-9-CM: 272.2 Vitals BP Pulse Resp Height(growth percentile) Weight(growth percentile) SpO2  
 98/71 (BP 1 Location: Right arm, BP Patient Position: Sitting) 75 16 5' 4\" (1.626 m) 209 lb 11.2 oz (95.1 kg) 99% BMI OB Status Smoking Status 35.99 kg/m2 Injection Never Smoker Vitals History BMI and BSA Data Body Mass Index Body Surface Area 35.99 kg/m 2 2.07 m 2 Preferred Pharmacy Pharmacy Name Phone [facility] [place] [address] [phone] Your Updated Medication List  
  
   
This list is accurate as of: 10/25/17 12:03 PM.  Always use your most recent med list.  
  
  
  
  
 AMITIZA 24 mcg capsule Generic drug:  lubiPROStone Take 24 mcg by mouth two (2) times daily (with meals). ammonium lactate 12 % lotion Commonly known as:  LAC-HYDRIN Apply  to affected area two (2) times a day. rub in to affected area well  
  
 benzoyl peroxide 5 % external liquid Apply  to affected area daily as needed. busPIRone 10 mg tablet Commonly known as:  BUSPAR Take 10 mg by mouth as needed. cetirizine 10 mg tablet Commonly known as:  ZYRTEC Take 10 mg by mouth daily. clomiPRAMINE 75 mg capsule Commonly known as:  ANAFRANIL Take 225 mg by mouth nightly. clonazePAM 2 mg tablet Commonly known as:  Toney Morales Take 2 mg by mouth three (3) times daily. DEPO-PROVERA 150 mg/mL injection Generic drug:  medroxyPROGESTERone 1 mL by IntraMUSCular route every three (3) months. divalproex  mg tablet Commonly known as:  DEPAKOTE Take 1,000 mg by mouth daily. fluconazole 150 mg tablet Commonly known as:  DIFLUCAN Take 150 mg by mouth every thirty (30) days. FLUoxetine 20 mg capsule Commonly known as:  PROzac Take 20 mg by mouth daily. insulin glargine 100 unit/mL (3 mL) Inpn Commonly known as:  LANTUS SOLOSTAR Inject 30 units at bed time  
  
 insulin lispro 100 unit/mL kwikpen Commonly known as:  HUMALOG Inject 6 units before breakfast , 6 units before lunch and 5 units before dinner only w/ SSI Max units daily: 47 Insulin Needles (Disposable) 29 gauge x 1/2\" Ndle Commonly known as:  BD INSULIN PEN NEEDLE UF ORIG Use to inject insulin 4 times daily Dx Code: E11.65 LORazepam 1 mg tablet Commonly known as:  ATIVAN Take 1 mg by mouth as needed for Anxiety. metFORMIN 1,000 mg tablet Commonly known as:  GLUCOPHAGE  
TAKE 1 TABLET BY MOUTH IN THE MORNING AND IN THE EVENING WITH FOOD  
  
 minocycline 50 mg capsule Commonly known as:  Eugene Prakash Take 50 mg by mouth daily. naltrexone 50 mg tablet Commonly known as:  DEPADE Take 50 mg by mouth daily. polyethylene glycol 17 gram packet Commonly known as:  Duane Lebeau Take 17 g by mouth as needed. risperiDONE 3 mg tablet Commonly known as:  RisperDAL Take 4 mg by mouth two (2) times a day. SEROquel  mg sr tablet Generic drug:  QUEtiapine SR Take 400 mg by mouth nightly. simvastatin 10 mg tablet Commonly known as:  ZOCOR Take 10 mg by mouth nightly. VITAMIN D3 1,000 unit Cap Generic drug:  cholecalciferol Take 1 Cap by mouth daily. We Performed the Following AMB POC GLUCOSE, QUANTITATIVE, BLOOD [82716 CPT(R)] AMB POC HEMOGLOBIN A1C [73492 CPT(R)] Follow-up Instructions Return in about 3 months (around 1/25/2018). Patient Instructions Check blood sugars before meals and at bedtime. Maintain the log and bring it all your appointments No insulin  If glucose is less than 70 If she has low glucose which is less than 70 - give her 4 oz juice and recheck every 15 minutes until it is more than 100 If the bedtime sugars are less than 100 ,eat a 15 gm snack. 1800 Kcal diet , low carb , high protein Avoid juices and sodas Lantus insulin 24 units at bed time, if she is still having low sugars in the middle of the night then we have to decrease 20 units of lantus Novolog or Humalog 6 units before breakfast , 6 units before  lunch and 0 units before dinner only No humalog or Novolog at bedtime Additional Novolog or Humalog for high sugars with meals 150-200 mg   Add 2 units 201-250 mg   Add 4  units 251-300 mg   Add 6 units 301-350 mg   Add 8 units 351-400 mg   Add 10 units If sugars are more than 450 then take her to [facility] Introducing [facility]!    
 [facility] introduces Backblaze patient portal. Now you can access parts of your medical record, email your doctor's office, and request medication refills online. 1. In your internet browser, go to [email or web address] 2. Click on the First Time User? Click Here link in the Sign In box. You will see the New Member Sign Up page. 3. Enter your CompareAway Access Code exactly as it appears below. You will not need to use this code after youve completed the sign-up process. If you do not sign up before the expiration date, you must request a new code. · CompareAway Access Code: [email or web address] Expires: 1/23/2018 12:03 PM 
 
4. Enter the last four digits of your Social Security Number (xxxx) and Date of Birth (mm/dd/yyyy) as indicated and click Submit. You will be taken to the next sign-up page. 5. Create a CompareAway ID. This will be your CompareAway login ID and cannot be changed, so think of one that is secure and easy to remember. 6. Create a CompareAway password. You can change your password at any time. 7. Enter your Password Reset Question and Answer. This can be used at a later time if you forget your password. 8. Enter your e-mail address. You will receive e-mail notification when new information is available in [address] 9. Click Sign Up. You can now view and download portions of your medical record. 10. Click the Download Summary menu link to download a portable copy of your medical information. If you have questions, please visit the Frequently Asked Questions section of the CompareAway website. Remember, CompareAway is NOT to be used for urgent needs. For medical emergencies, dial 911. Now available from your iPhone and Android! Please provide this summary of care documentation to your next provider. Your primary care clinician is listed as Ericka Pettit. If you have any questions after today's visit, please call [phone].

## 2017-10-25 NOTE — PATIENT INSTRUCTIONS
Check blood sugars before meals and at bedtime. Maintain the log and bring it all your appointments      No insulin  If glucose is less than 70     If she has low glucose which is less than 70 - give her 4 oz juice and recheck every 15 minutes until it is more than 100     If the bedtime sugars are less than 100 ,eat a 15 gm snack.     1800 Kcal diet , low carb , high protein    Avoid juices and sodas     Lantus insulin 24 units at bed time, if she is still having low sugars in the middle of the night then we have to decrease 20 units of lantus     Novolog or Humalog 6 units before breakfast , 6 units before  lunch and 0 units before dinner only   No humalog or Novolog at bedtime        Additional Novolog or Humalog for high sugars with meals     150-200 mg   Add 2 units     201-250 mg   Add 4  units     251-300 mg   Add 6 units     301-350 mg   Add 8 units     351-400 mg   Add 10 units         If sugars are more than 450 then take her to [facility]

## 2017-12-16 LAB
CREATININE, EXTERNAL: 0.71
HBA1C MFR BLD HPLC: 7.5 %
LDL-C, EXTERNAL: 67
MICROALBUMIN UR TEST STR-MCNC: 11 MG/DL

## 2018-01-03 ENCOUNTER — TELEPHONE (OUTPATIENT)
Dept: ENDOCRINOLOGY | Age: 33
End: 2018-01-03

## 2018-01-03 NOTE — TELEPHONE ENCOUNTER
Asked to Damien Rose regarding Ms Rosario's BG. Miles Delay was not available. Call back information given to aide.

## 2018-01-03 NOTE — TELEPHONE ENCOUNTER
----- Message from Geri Pierson sent at 1/3/2018 12:57 PM EST -----  Regarding: Dr. Nova Nicole  Pt's caregiver, Muna Lund, requesting to speak to nurse in regards to concerns with pt's blood sugar levels. In addition, pt's caregiver is aware of the pt's upcoming appts but would still like to speak to the nurse.      Best contact number: [phone]

## 2018-01-19 ENCOUNTER — HOSPITAL ENCOUNTER (OUTPATIENT)
Dept: LAB | Age: 33
Discharge: HOME | End: 2018-01-19
Payer: MEDICARE

## 2018-01-19 PROCEDURE — 36415 COLL VENOUS BLD VENIPUNCTURE: CPT

## 2018-01-19 PROCEDURE — 80053 COMPREHEN METABOLIC PANEL: CPT

## 2018-01-19 PROCEDURE — 80061 LIPID PANEL: CPT

## 2018-01-19 PROCEDURE — 83036 HEMOGLOBIN GLYCOSYLATED A1C: CPT

## 2018-01-29 ENCOUNTER — OFFICE VISIT (OUTPATIENT)
Dept: ENDOCRINOLOGY | Age: 33
End: 2018-01-29

## 2018-01-29 VITALS
HEIGHT: 64 IN | DIASTOLIC BLOOD PRESSURE: 72 MMHG | BODY MASS INDEX: 34.71 KG/M2 | SYSTOLIC BLOOD PRESSURE: 111 MMHG | HEART RATE: 106 BPM | RESPIRATION RATE: 16 BRPM | TEMPERATURE: 96.9 F | WEIGHT: 203.3 LBS | OXYGEN SATURATION: 99 %

## 2018-01-29 DIAGNOSIS — E66.9 NON MORBID OBESITY: ICD-10-CM

## 2018-01-29 DIAGNOSIS — E78.2 MIXED HYPERLIPIDEMIA: ICD-10-CM

## 2018-01-29 DIAGNOSIS — Z79.4 UNCONTROLLED TYPE 2 DIABETES MELLITUS WITH HYPERGLYCEMIA, WITH LONG-TERM CURRENT USE OF INSULIN (HCC): ICD-10-CM

## 2018-01-29 DIAGNOSIS — Z79.4 TYPE 2 DIABETES MELLITUS WITH HYPERGLYCEMIA, WITH LONG-TERM CURRENT USE OF INSULIN (HCC): Primary | ICD-10-CM

## 2018-01-29 DIAGNOSIS — E11.65 UNCONTROLLED TYPE 2 DIABETES MELLITUS WITH HYPERGLYCEMIA, WITH LONG-TERM CURRENT USE OF INSULIN (HCC): ICD-10-CM

## 2018-01-29 DIAGNOSIS — E11.65 TYPE 2 DIABETES MELLITUS WITH HYPERGLYCEMIA, WITH LONG-TERM CURRENT USE OF INSULIN (HCC): Primary | ICD-10-CM

## 2018-01-29 RX ORDER — INSULIN GLARGINE 100 [IU]/ML
INJECTION, SOLUTION SUBCUTANEOUS
Qty: 15 ML | Refills: 11 | Status: SHIPPED | OUTPATIENT
Start: 2018-01-29 | End: 2018-10-10 | Stop reason: SDUPTHER

## 2018-01-29 NOTE — PROGRESS NOTES
Oswaldo Knox is a 27 y.o. female here for   Chief Complaint   Patient presents with    Diabetes       Functional glucose monitor and record keeping system? - yes  Eye exam within last year? - no, need referral    1. Have you been to the ER, urgent care clinic since your last visit? Hospitalized since your last visit? -no    2. Have you seen or consulted any other health care providers outside of the [facility] since your last visit?   Include any pap smears or colon screening.-PCP, Dec 1st 2017      Lab Results   Component Value Date/Time    Hemoglobin A1c 6.9 05/13/2016 10:54 AM    Hemoglobin A1c (POC) 7.6 10/25/2017 11:36 AM    Hemoglobin A1c, External 8.7 09/24/2015       Wt Readings from Last 3 Encounters:   10/25/17 209 lb 11.2 oz (95.1 kg)   07/13/17 194 lb 12.8 oz (88.4 kg)   04/13/17 190 lb 4.8 oz (86.3 kg)     Temp Readings from Last 3 Encounters:   07/13/17 98.2 °F (36.8 °C) (Axillary)   04/13/17 (!) 113 °F (45 °C)   08/31/16 97 °F (36.1 °C)     BP Readings from Last 3 Encounters:   10/25/17 98/71   07/13/17 110/72   04/13/17 122/90     Pulse Readings from Last 3 Encounters:   10/25/17 75   07/13/17 (!) 113   04/13/17 (!) 113

## 2018-01-29 NOTE — MR AVS SNAPSHOT
[address] [place] [address] 
[phone] Patient: Young Yan MRN: [id number] :[id number] Visit Information Date & Time Provider Department Dept. Phone Encounter #  
 2018  2:30 PM Samia Bishop MD [facility] [phone] [id number] Follow-up Instructions Return in about 4 months (around 2018). Upcoming Health Maintenance Date Due  
 EYE EXAM RETINAL OR DILATED Q1 10/24/1993 Pneumococcal 19-64 Medium Risk (1 of 1 - PPSV23) 10/24/2002 DTaP/Tdap/Td series (1 - Tdap) 10/24/2004 PAP AKA CERVICAL CYTOLOGY 10/24/2004 FOOT EXAM Q1 2016 MICROALBUMIN Q1 2017 LIPID PANEL Q1 2017 Influenza Age 5 to Adult 2017 HEMOGLOBIN A1C Q6M 2018 Allergies as of 2018  Review Complete On: 2018 By: Samia Bishop MD  
  
 Severity Noted Reaction Type Reactions Other Plant, Animal, Environmental  2015    Sneezing Current Immunizations  Never Reviewed No immunizations on file. Not reviewed this visit You Were Diagnosed With   
  
 Codes Comments Type 2 diabetes mellitus with hyperglycemia, with long-term current use of insulin (HCC)    -  Primary ICD-10-CM: E11.65, Z79.4 ICD-9-CM: 250.00, 790.29, V58.67 Mixed hyperlipidemia     ICD-10-CM: E78.2 ICD-9-CM: 272.2 Non morbid obesity     ICD-10-CM: E66.9 ICD-9-CM: 278.00 Vitals BP Pulse Temp Resp Height(growth percentile) Weight(growth percentile) 111/72 (BP 1 Location: Left arm, BP Patient Position: Sitting) (!) 106 96.9 °F (36.1 °C) (Oral) 16 5' 4\" (1.626 m) 203 lb 4.8 oz (92.2 kg) SpO2 BMI OB Status Smoking Status 99% 34.9 kg/m2 Injection Never Smoker Vitals History BMI and BSA Data Body Mass Index Body Surface Area 34.9 kg/m 2 2.04 m 2 Preferred Pharmacy Pharmacy Name Phone [facility] [address] [phone] Your Updated Medication List  
  
   
This list is accurate as of: 1/29/18  3:25 PM.  Always use your most recent med list.  
  
  
  
  
 AMITIZA 24 mcg capsule Generic drug:  lubiPROStone Take 24 mcg by mouth two (2) times daily (with meals). ammonium lactate 12 % lotion Commonly known as:  LAC-HYDRIN Apply  to affected area two (2) times a day. rub in to affected area well  
  
 benzoyl peroxide 5 % external liquid Apply  to affected area daily as needed. busPIRone 10 mg tablet Commonly known as:  BUSPAR Take 10 mg by mouth as needed. cetirizine 10 mg tablet Commonly known as:  ZYRTEC Take 10 mg by mouth daily. clomiPRAMINE 75 mg capsule Commonly known as:  ANAFRANIL Take 225 mg by mouth nightly. clonazePAM 2 mg tablet Commonly known as:  Tiney Player Take 2 mg by mouth three (3) times daily. DEPO-PROVERA 150 mg/mL injection Generic drug:  medroxyPROGESTERone 1 mL by IntraMUSCular route every three (3) months. divalproex  mg tablet Commonly known as:  DEPAKOTE Take 1,000 mg by mouth daily. fluconazole 150 mg tablet Commonly known as:  DIFLUCAN Take 150 mg by mouth every thirty (30) days. FLUoxetine 20 mg capsule Commonly known as:  PROzac Take 20 mg by mouth daily. insulin glargine 100 unit/mL (3 mL) Inpn Commonly known as:  LANTUS SOLOSTAR Inject 30 units at bed time  
  
 insulin lispro 100 unit/mL kwikpen Commonly known as:  HUMALOG Inject 6 units before breakfast , 6 units before lunch and 5 units before dinner only w/ SSI Max units daily: 47 Insulin Needles (Disposable) 29 gauge x 1/2\" Ndle Commonly known as:  BD INSULIN PEN NEEDLE UF ORIG Use to inject insulin 4 times daily Dx Code: E11.65 LORazepam 1 mg tablet Commonly known as:  ATIVAN Take 1 mg by mouth as needed for Anxiety. metFORMIN 1,000 mg tablet Commonly known as:  GLUCOPHAGE  
TAKE 1 TABLET BY MOUTH IN THE MORNING AND IN THE EVENING WITH FOOD  
  
 minocycline 50 mg capsule Commonly known as:  Skye Reid Take 50 mg by mouth daily. naltrexone 50 mg tablet Commonly known as:  DEPADE Take 50 mg by mouth daily. polyethylene glycol 17 gram packet Commonly known as:  Philippe Baxter Take 17 g by mouth as needed. risperiDONE 3 mg tablet Commonly known as:  RisperDAL Take 4 mg by mouth two (2) times a day. SEROquel  mg sr tablet Generic drug:  QUEtiapine SR Take 400 mg by mouth nightly. simvastatin 10 mg tablet Commonly known as:  ZOCOR Take 10 mg by mouth nightly. VITAMIN D3 1,000 unit Cap Generic drug:  cholecalciferol Take 1 Cap by mouth daily. Follow-up Instructions Return in about 4 months (around 5/29/2018). Patient Instructions Check blood sugars before meals and at bedtime. Maintain the log and bring it all your appointments No insulin  If glucose is less than 70 If she has low glucose which is less than 70 - give her 4 oz juice and recheck every 15 minutes until it is more than 100 If the bedtime sugars are less than 100 ,eat a 15 gm snack. 1800 Kcal diet , low carb , high protein Avoid juices and sodas Lantus insulin 24 units at bed time Novolog or Humalog 6 units before breakfast , 6 units before  lunch and 0 units before dinner only No humalog or Novolog at bedtime Additional Novolog or Humalog for high sugars with meals 150-200 mg   Add 2 units 201-250 mg   Add 4  units 251-300 mg   Add 6 units 301-350 mg   Add 8 units 351-400 mg   Add 10 units If sugars are more than 450 then take her to [facility] Introducing [facility]!    
 [facility] introduces [employer] patient portal. Now you can access parts of your medical record, email your doctor's office, and request medication refills online. 1. In your internet browser, go to [email or web address] 2. Click on the First Time User? Click Here link in the Sign In box. You will see the New Member Sign Up page. 3. Enter your OnBeep Access Code exactly as it appears below. You will not need to use this code after youve completed the sign-up process. If you do not sign up before the expiration date, you must request a new code. · OnBeep Access Code: [email or web address] Expires: 4/29/2018  3:24 PM 
 
4. Enter the last four digits of your Social Security Number (xxxx) and Date of Birth (mm/dd/yyyy) as indicated and click Submit. You will be taken to the next sign-up page. 5. Create a OnBeep ID. This will be your OnBeep login ID and cannot be changed, so think of one that is secure and easy to remember. 6. Create a OnBeep password. You can change your password at any time. 7. Enter your Password Reset Question and Answer. This can be used at a later time if you forget your password. 8. Enter your e-mail address. You will receive e-mail notification when new information is available in [address] 9. Click Sign Up. You can now view and download portions of your medical record. 10. Click the Download Summary menu link to download a portable copy of your medical information. If you have questions, please visit the Frequently Asked Questions section of the OnBeep website. Remember, OnBeep is NOT to be used for urgent needs. For medical emergencies, dial 911. Now available from your iPhone and Android! Please provide this summary of care documentation to your next provider. Your primary care clinician is listed as Jess Fuller. If you have any questions after today's visit, please call [phone].

## 2018-01-29 NOTE — PATIENT INSTRUCTIONS
Check blood sugars before meals and at bedtime. Maintain the log and bring it all your appointments      No insulin  If glucose is less than 70     If she has low glucose which is less than 70 - give her 4 oz juice and recheck every 15 minutes until it is more than 100     If the bedtime sugars are less than 100 ,eat a 15 gm snack.     1800 Kcal diet , low carb , high protein    Avoid juices and sodas     Lantus insulin 24 units at bed time     Novolog or Humalog 6 units before breakfast , 6 units before  lunch and 0 units before dinner only   No humalog or Novolog at bedtime        Additional Novolog or Humalog for high sugars with meals     150-200 mg   Add 2 units     201-250 mg   Add 4  units     251-300 mg   Add 6 units     301-350 mg   Add 8 units     351-400 mg   Add 10 units         If sugars are more than 450 [facility]

## 2018-01-29 NOTE — PROGRESS NOTES
Hua Lance MD             Patient Information  Date:1/29/2018  Name : Bhavani Clarke 27 y.o.     YOB: 1985         Referred by:  Group home       Chief Complaint   Patient presents with    Diabetes       History of Present Illness: Bhavani Clarke is a 27 y.o. female here for fu of  Type 2 Diabetes Mellitus. Group home forgot to bring log   No severe hypoglycemia , on a reduced dose of lantus    was hospitalized for hypernatremia  Hx Hypoglycemia - at bedtime , eats well, so stopped evening Humalog , no s/o gastroparesis    She lives in a Group home due to underlying Psychiatric condition     Here with care taker , does not communicate  Monitoring frequency:3 /day         Wt Readings from Last 3 Encounters:   01/29/18 203 lb 4.8 oz (92.2 kg)   10/25/17 209 lb 11.2 oz (95.1 kg)   07/13/17 194 lb 12.8 oz (88.4 kg)       BP Readings from Last 3 Encounters:   01/29/18 111/72   10/25/17 98/71   07/13/17 110/72           Past Medical History:   Diagnosis Date    Autism     Diabetes ([facility] [address]     Intermittent explosive disorder      Current Outpatient Prescriptions   Medication Sig    LORazepam (ATIVAN) 1 mg tablet Take 1 mg by mouth as needed for Anxiety.     metFORMIN (GLUCOPHAGE) 1,000 mg tablet TAKE 1 TABLET BY MOUTH IN THE MORNING AND IN THE EVENING WITH FOOD    Insulin Needles, Disposable, (BD INSULIN PEN NEEDLE UF ORIG) 29 gauge x 1/2\" ndle Use to inject insulin 4 times daily Dx Code: E11.65    insulin lispro (HUMALOG) 100 unit/mL kwikpen Inject 6 units before breakfast , 6 units before lunch and 5 units before dinner only w/ SSI Max units daily: 47 (Patient taking differently: Inject 6 units before breakfast , 6 units before lunch and 0 units before dinner only w/ SSI Max units daily: 47)    insulin glargine (LANTUS SOLOSTAR) 100 unit/mL (3 mL) pen Inject 30 units at bed time (Patient taking differently: Inject 24 units at bed time)    polyethylene glycol (MIRALAX) 17 gram packet Take 17 g by mouth as needed.  benzoyl peroxide 5 % external liquid Apply  to affected area daily as needed.  ammonium lactate (LAC-HYDRIN) 12 % lotion Apply  to affected area two (2) times a day. rub in to affected area well    QUEtiapine SR (SEROQUEL XR) 300 mg sr tablet Take 400 mg by mouth nightly.  clomiPRAMINE (ANAFRANIL) 75 mg capsule Take 225 mg by mouth nightly.  cetirizine (ZYRTEC) 10 mg tablet Take 10 mg by mouth daily.  simvastatin (ZOCOR) 10 mg tablet Take 10 mg by mouth nightly.  divalproex DR (DEPAKOTE) 500 mg tablet Take 1,000 mg by mouth daily.  risperiDONE (RISPERDAL) 3 mg tablet Take 4 mg by mouth two (2) times a day.  medroxyPROGESTERone (DEPO-PROVERA) 150 mg/mL injection 1 mL by IntraMUSCular route every three (3) months.  fluconazole (DIFLUCAN) 150 mg tablet Take 150 mg by mouth every thirty (30) days.  Cholecalciferol, Vitamin D3, (VITAMIN D3) 1,000 unit cap Take 1 Cap by mouth daily.  lubiPROStone (AMITIZA) 24 mcg capsule Take 24 mcg by mouth two (2) times daily (with meals).  busPIRone (BUSPAR) 10 mg tablet Take 10 mg by mouth as needed.  minocycline (MINOCIN, DYNACIN) 50 mg capsule Take 50 mg by mouth daily.  clonazePAM (KLONOPIN) 2 mg tablet Take 2 mg by mouth three (3) times daily.  naltrexone (DEPADE) 50 mg tablet Take 50 mg by mouth daily.  FLUoxetine (PROZAC) 20 mg capsule Take 20 mg by mouth daily. No current facility-administered medications for this visit. Allergies   Allergen Reactions    Other Plant, Animal, Environmental Sneezing         Review of Systems: unable to obtain due to underlying mental status  -     Physical Examination:   Blood pressure 111/72, pulse (!) 106, temperature 96.9 °F (36.1 °C), temperature source Oral, resp. rate 16, height 5' 4\" (1.626 m), weight 203 lb 4.8 oz (92.2 kg), SpO2 99 %.  Estimated body mass index is 34.9 kg/(m^2) as calculated from the following:    Height as of this encounter: 5' 4\" (1.626 m). -   Weight as of this encounter: 203 lb 4.8 oz (92.2 kg). - General: no distress  - HEENT:  no periorbital edema,   - Neck: supple, no thyromegaly  - Cardiovascular: regular, normal rate, normal S1 and S2,   - Respiratory: clear to auscultation bilaterally  - Gastrointestinal: soft, nontender, nondistended,  BS +  - Neurological:alert and not oriented    Diabetic feet exam ;     H/o partial or complete amputation of foot : NO  H/o previous foot ulceration : NO  H/o pre - ulcerative callus : YES  H/o peripheral neuropathy and callus : No ( does not communicate ,so hard to assess )   H/o poor circulation  : NO  Foot deformity : yes bunion   Callus    - Psychiatric: restless  - Skin: color, texture, turgor normal.     Diabetic foot exam: Jan 2018     Left:     Vibratory sensation ND - non communicative    Filament test  ND   Pulse DP: 1+    Deformities: callus   Right:    Vibratory sensation  ND    Filament test    Pulse DP: 1+   Deformities: callus      . Data Reviewed:     [] Glucose records reviewed. [] See glucose records for details (to be scanned).   [] A1C  [] Reviewed labs    Lab Results   Component Value Date/Time    Hemoglobin A1c 6.9 05/13/2016 10:54 AM    Hemoglobin A1c 7.4 02/17/2016 04:23 PM    Hemoglobin A1c 8.7 06/12/2015 02:50 PM    Glucose 90 05/13/2016 10:54 AM    Glucose  10/25/2017 11:35 AM    Microalb/Creat ratio (ug/mg creat.) <6.7 05/13/2016 10:54 AM    LDL, calculated 51 05/13/2016 10:54 AM    Creatinine 0.69 05/13/2016 10:54 AM    Hemoglobin A1c, External 8.7 09/24/2015    Hemoglobin A1c, External 8.0 09/22/2015      Lab Results   Component Value Date/Time    GFR est  05/13/2016 10:54 AM    GFR est non- 05/13/2016 10:54 AM    Creatinine 0.69 05/13/2016 10:54 AM    BUN 7 05/13/2016 10:54 AM    Sodium 142 05/13/2016 10:54 AM    Potassium 4.3 05/13/2016 10:54 AM    Chloride 101 05/13/2016 10:54 AM    CO2 17 05/13/2016 10:54 AM        Assessment/Plan:     1. Type 2 diabetes mellitus with hyperglycemia, with long-term current use of insulin ([address]    2. Mixed hyperlipidemia        1. Type 2 Diabetes Mellitus   Lab Results   Component Value Date/Time    Hemoglobin A1c 6.9 05/13/2016 10:54 AM    Hemoglobin A1c (POC) 7.6 10/25/2017 11:36 AM    Hemoglobin A1c, External 8.7 09/24/2015     Stable    Metformin  Lantus insulin 24 units at bed time  Novolog or Humalog  with meals , breakfast ,lunch and no fixed prandial insulin at dinner    Written instructions given  Advised to check glucose  4 times daily      2. HTN :  cannot tolerate ACE    3. Hyperlipidemia : Continue statin. 4.Obesity:Body mass index is 34.9 kg/(m^2). Diet managed by Group home and she is on a low carb diet           There are no Patient Instructions on file for this visit. Follow-up Disposition: Not on File    Thank you for allowing me to participate in the care of this patient.     Donny Crook MD

## 2018-03-22 ENCOUNTER — OFFICE VISIT (OUTPATIENT)
Dept: ENDOCRINOLOGY | Age: 33
End: 2018-03-22

## 2018-03-22 VITALS
WEIGHT: 196.6 LBS | BODY MASS INDEX: 33.57 KG/M2 | HEART RATE: 98 BPM | DIASTOLIC BLOOD PRESSURE: 78 MMHG | HEIGHT: 64 IN | SYSTOLIC BLOOD PRESSURE: 113 MMHG

## 2018-03-22 DIAGNOSIS — E11.65 UNCONTROLLED TYPE 2 DIABETES MELLITUS WITH HYPERGLYCEMIA, WITH LONG-TERM CURRENT USE OF INSULIN (HCC): ICD-10-CM

## 2018-03-22 DIAGNOSIS — Z79.4 UNCONTROLLED TYPE 2 DIABETES MELLITUS WITH HYPERGLYCEMIA, WITH LONG-TERM CURRENT USE OF INSULIN (HCC): ICD-10-CM

## 2018-03-22 DIAGNOSIS — Z79.4 TYPE 2 DIABETES MELLITUS WITH HYPERGLYCEMIA, WITH LONG-TERM CURRENT USE OF INSULIN (HCC): Primary | ICD-10-CM

## 2018-03-22 DIAGNOSIS — E11.65 TYPE 2 DIABETES MELLITUS WITH HYPERGLYCEMIA, WITH LONG-TERM CURRENT USE OF INSULIN (HCC): Primary | ICD-10-CM

## 2018-03-22 DIAGNOSIS — E78.2 MIXED HYPERLIPIDEMIA: ICD-10-CM

## 2018-03-22 DIAGNOSIS — E66.9 NON MORBID OBESITY: ICD-10-CM

## 2018-03-22 LAB
GLUCOSE POC: 190 MG/DL
HBA1C MFR BLD HPLC: 7.4 %

## 2018-03-22 RX ORDER — METFORMIN HYDROCHLORIDE 1000 MG/1
TABLET ORAL
Qty: 60 TAB | Refills: 11 | Status: SHIPPED | OUTPATIENT
Start: 2018-03-22 | End: 2019-03-15 | Stop reason: SDUPTHER

## 2018-03-22 RX ORDER — INSULIN LISPRO 100 [IU]/ML
INJECTION, SOLUTION INTRAVENOUS; SUBCUTANEOUS
Qty: 15 ML | Refills: 11 | Status: SHIPPED | OUTPATIENT
Start: 2018-03-22 | End: 2019-04-04 | Stop reason: SDUPTHER

## 2018-03-22 RX ORDER — PEN NEEDLE, DIABETIC 29 G X1/2"
NEEDLE, DISPOSABLE MISCELLANEOUS
Qty: 200 PEN NEEDLE | Refills: 11 | Status: SHIPPED | OUTPATIENT
Start: 2018-03-22 | End: 2019-04-09 | Stop reason: SDUPTHER

## 2018-03-22 NOTE — PROGRESS NOTES
Joseph Garcia is a 27 y.o. female here for   Chief Complaint   Patient presents with    Diabetes    Cholesterol Problem       Functional glucose monitor and record keeping system? - yes  Eye exam within last year? - Has appt May 2018  Foot exam within last year? - on file    1. Have you been to the ER, urgent care clinic since your last visit? Hospitalized since your last visit? -no    2. Have you seen or consulted any other health care providers outside of the [address] [place] [address] since your last visit? Include any pap smears or colon screening. -PCP      Lab Results   Component Value Date/Time    Hemoglobin A1c 6.9 (H) 05/13/2016 10:54 AM    Hemoglobin A1c (POC) 7.6 10/25/2017 11:36 AM    Hemoglobin A1c, External 7.5 12/16/2017       Wt Readings from Last 3 Encounters:   01/29/18 203 lb 4.8 oz (92.2 kg)   10/25/17 209 lb 11.2 oz (95.1 kg)   07/13/17 194 lb 12.8 oz (88.4 kg)     Temp Readings from Last 3 Encounters:   01/29/18 96.9 °F (36.1 °C) (Oral)   07/13/17 98.2 °F (36.8 °C) (Axillary)   04/13/17 (!) 113 °F (45 °C)     BP Readings from Last 3 Encounters:   01/29/18 111/72   10/25/17 98/71   07/13/17 110/72     Pulse Readings from Last 3 Encounters:   01/29/18 (!) 106   10/25/17 75   07/13/17 (!) 113

## 2018-03-22 NOTE — PATIENT INSTRUCTIONS
Check blood sugars before meals and at bedtime. Maintain the log and bring it all your appointments      No insulin  If glucose is less than 70     If she has low glucose which is less than 70 - give her 4 oz juice and recheck every 15 minutes until it is more than 100     If the bedtime sugars are less than 100 ,eat a 15 gm snack.     1800 Kcal diet , low carb , high protein    Avoid juices and sodas     Lantus insulin 24 units at bed time     Novolog or Humalog 8 units before breakfast , 8 units before  lunch and 4 units before dinner   No humalog or Novolog at bedtime        Additional Novolog or Humalog for high sugars with meals     150-200 mg   Add 2 units     201-250 mg   Add 4  units     251-300 mg   Add 6 units     301-350 mg   Add 8 units     351-400 mg   Add 10 units         If sugars are more than 450 then take her to hospital

## 2018-03-22 NOTE — PROGRESS NOTES
Nica Rincon MD             Patient Information  Date:3/24/2018  Name : Luther Campbell 27 y.o.     YOB: 1985         Referred by:  Group home       Chief Complaint   Patient presents with    Diabetes    Cholesterol Problem       History of Present Illness: Luther Campbell is a 27 y.o. female here for fu of  Type 2 Diabetes Mellitus. Worsening of hyperglycemia  Recent changes in her psychotropic medication, Risperdal can cause hyperglycemia  Also there is no change in the staff recently and there is a pattern of hyperglycemia mostly on the weekends  which was discussed with the caretaker who was with the patient, suspect the weekend staff is giving her more carbs than she needs. Due to increase in her psychotropic medications she is also hungry and constantly asking for the food, reportedly gets agitated when she does not get something to eat. Blood glucose is fluctuating 1 the glucose is 250 and the very next day it is in the 90s    Hx Hypoglycemia - at bedtime , eats well, so stopped evening Humalog , no s/o gastroparesis    She lives in a Group home due to underlying Psychiatric condition     Here with care taker , does not communicate  Monitoring frequency:3 /day         Wt Readings from Last 3 Encounters:   03/23/18 196 lb (88.9 kg)   03/22/18 196 lb 9.6 oz (89.2 kg)   01/29/18 203 lb 4.8 oz (92.2 kg)       BP Readings from Last 3 Encounters:   03/23/18 96/61   03/22/18 113/78   01/29/18 111/72           Past Medical History:   Diagnosis Date    Autism     Diabetes ([facility] [address]     Intermittent explosive disorder      Current Outpatient Prescriptions   Medication Sig    insulin glargine (LANTUS SOLOSTAR) 100 unit/mL (3 mL) inpn Inject 24 units at bed time    LORazepam (ATIVAN) 1 mg tablet Take 1 mg by mouth as needed for Anxiety.  busPIRone (BUSPAR) 10 mg tablet Take 15 mg by mouth two (2) times a day.     polyethylene glycol (MIRALAX) 17 gram packet Take 17 g by mouth as needed.  benzoyl peroxide 5 % external liquid Apply  to affected area daily as needed.  ammonium lactate (LAC-HYDRIN) 12 % lotion Apply  to affected area two (2) times a day. rub in to affected area well    QUEtiapine SR (SEROQUEL XR) 300 mg sr tablet Take 400 mg by mouth nightly.  clomiPRAMINE (ANAFRANIL) 75 mg capsule Take 225 mg by mouth nightly.  cetirizine (ZYRTEC) 10 mg tablet Take 10 mg by mouth daily.  simvastatin (ZOCOR) 10 mg tablet Take 10 mg by mouth nightly.  divalproex DR (DEPAKOTE) 500 mg tablet Take 1,000 mg by mouth daily.  risperiDONE (RISPERDAL) 3 mg tablet Take 8 mg by mouth two (2) times a day.  medroxyPROGESTERone (DEPO-PROVERA) 150 mg/mL injection 1 mL by IntraMUSCular route every three (3) months.  fluconazole (DIFLUCAN) 150 mg tablet Take 150 mg by mouth every thirty (30) days.  Cholecalciferol, Vitamin D3, (VITAMIN D3) 1,000 unit cap Take 1 Cap by mouth daily.  lubiPROStone (AMITIZA) 24 mcg capsule Take 24 mcg by mouth two (2) times daily (with meals).  nitrofurantoin, macrocrystal-monohydrate, (MACROBID) 100 mg capsule Take 1 Cap by mouth two (2) times a day.  insulin lispro (HUMALOG) 100 unit/mL kwikpen 8 units before breakfast , 8 units before  lunch and 4 units before dinner No humalog or Novolog at [id number] mg Add 2 units 201-250 mg  Add 4  units 251-300 mg Add 6 units 301-350 mg Add 8 units 351-400mg Add 10 units If sugars are more than 450 then take her to hospital    metFORMIN (GLUCOPHAGE) 1,000 mg tablet TAKE 1 TABLET BY MOUTH IN THE MORNING AND IN THE EVENING WITH FOOD    Insulin Needles, Disposable, (BD INSULIN PEN NEEDLE UF ORIG) 29 gauge x 1/2\" ndle Use to inject insulin 4 times daily Dx Code: E11.65    minocycline (MINOCIN, DYNACIN) 50 mg capsule Take 50 mg by mouth daily.  clonazePAM (KLONOPIN) 2 mg tablet Take 2 mg by mouth three (3) times daily.     naltrexone (DEPADE) 50 mg tablet Take 50 mg by mouth daily.  FLUoxetine (PROZAC) 20 mg capsule Take 20 mg by mouth daily. No current facility-administered medications for this visit. Allergies   Allergen Reactions    Other Plant, Animal, Environmental Sneezing         Review of Systems: unable to obtain due to underlying mental status  -     Physical Examination:   Blood pressure 113/78, pulse 98, height 5' 4\" (1.626 m), weight 196 lb 9.6 oz (89.2 kg). Estimated body mass index is 33.75 kg/(m^2) as calculated from the following:    Height as of this encounter: 5' 4\" (1.626 m). -   Weight as of this encounter: 196 lb 9.6 oz (89.2 kg). - General: no distress  - HEENT:  no periorbital edema,   - Neck: supple, no thyromegaly  - Cardiovascular: regular, normal rate, normal S1 and S2,   - Respiratory: clear to auscultation bilaterally  - Gastrointestinal: soft, nontender, nondistended,  BS +  - Neurological:alert and not oriented      - Psychiatric: restless  - Skin: color, texture, turgor normal.     Diabetic foot exam: Jan 2018     Left:     Vibratory sensation ND - non communicative    Filament test  ND   Pulse DP: 1+    Deformities: callus   Right:    Vibratory sensation  ND    Filament test -noncommunicative   Pulse DP: 1+   Deformities: callus      . Data Reviewed:     [] Glucose records reviewed. [] See glucose records for details (to be scanned).   [] A1C  [] Reviewed labs    Lab Results   Component Value Date/Time    Hemoglobin A1c 6.9 (H) 05/13/2016 10:54 AM    Hemoglobin A1c 7.4 (H) 02/17/2016 04:23 PM    Hemoglobin A1c 8.7 (H) 06/12/2015 02:50 PM    Glucose 90 05/13/2016 10:54 AM    Glucose  03/22/2018 01:41 PM    Microalb/Creat ratio (ug/mg creat.) <6.7 05/13/2016 10:54 AM    LDL, calculated 51 05/13/2016 10:54 AM    Creatinine 0.69 05/13/2016 10:54 AM    Hemoglobin A1c, External 7.5 12/16/2017    Hemoglobin A1c, External 8.7 09/24/2015    Hemoglobin A1c, External 8.0 09/22/2015      Lab Results   Component Value Date/Time    GFR est  05/13/2016 10:54 AM    GFR est non- 05/13/2016 10:54 AM    Creatinine 0.69 05/13/2016 10:54 AM    BUN 7 05/13/2016 10:54 AM    Sodium 142 05/13/2016 10:54 AM    Potassium 4.3 05/13/2016 10:54 AM    Chloride 101 05/13/2016 10:54 AM    CO2 17 (L) 05/13/2016 10:54 AM        Assessment/Plan:     1. Type 2 diabetes mellitus with hyperglycemia, with long-term current use of insulin ([address]    2. Mixed hyperlipidemia        1. Type 2 Diabetes Mellitus   Lab Results   Component Value Date/Time    Hemoglobin A1c 6.9 (H) 05/13/2016 10:54 AM    Hemoglobin A1c (POC) 7.4 03/22/2018 01:42 PM    Hemoglobin A1c, External 7.5 12/16/2017     Worsening of hyperglycemia  Recent changes in her psychotropic medication, Risperdal can cause hyperglycemia  Also there is no change in the staff recently and there is a pattern of hyperglycemia mostly on the weekends  which was discussed with the caretaker who was with the patient, suspect the weekend staff is giving her more carbs than she needs. Due to increase in her psychotropic medications she is also hungry and constantly asking for the food, reportedly gets agitated when she does not get something to eat. Blood glucose is fluctuating 1 the glucose is 250 and the very next day it is in the 90s    Insulin adjusted   metformin  Lantus insulin 24 units at bed time  Novolog or Humalog  with meals     Written instructions given  Advised to check glucose  4 times daily      2. HTN :  cannot tolerate ACE    3. Hyperlipidemia : Continue statin. 4.Obesity:Body mass index is 33.75 kg/(m^2). Diet managed by Group home            Patient Instructions   Check blood sugars before meals and at bedtime.     Maintain the log and bring it all your appointments      No insulin  If glucose is less than 70     If she has low glucose which is less than 70 - give her 4 oz juice and recheck every 15 minutes until it is more than 100     If the bedtime sugars are less than 100 ,eat a 15 gm snack. 1800 Kcal diet , low carb , high protein    Avoid juices and sodas     Lantus insulin 24 units at bed time     Novolog or Humalog 8 units before breakfast , 8 units before  lunch and 4 units before dinner   No humalog or Novolog at bedtime        Additional Novolog or Humalog for high sugars with meals     150-200 mg   Add 2 units     201-250 mg   Add 4  units     251-300 mg   Add 6 units     301-350 mg   Add 8 units     351-400 mg   Add 10 units         If sugars are more than 450 then take her to hospital    Follow-up Disposition: Not on File    Thank you for allowing me to participate in the care of this patient.     Ame Leo MD

## 2018-03-22 NOTE — MR AVS SNAPSHOT
[address] [facility] [address] [facility] [address] 
[phone] Patient: Kelly Rodriguez MRN: [id number] :1985 Visit Information Date & Time Provider Department Dept. Phone Encounter #  
 3/22/2018  1:30 PM Mabel Avila MD [facility] [phone] [id number] Your Appointments 3/23/2018 11:00 AM  
PHYSICAL PRE OP with Norma Yo, EMILE [address] [facility] ([facility]) Appt Note: New pt/ UTI/ tlt 18  
 [address] [address] [address] [phone]  
  
   
 [address] [address]  
  
    
 2018  1:30 PM  
ROUTINE CARE with Mabel Avila MD  
[facility]) Appt Note: 4 mon fu DM  
 [address] [facility] [address]  
[phone]  
  
   
 [address] [place] [address] Upcoming Health Maintenance Date Due  
 EYE EXAM RETINAL OR DILATED Q1 10/24/1993 Pneumococcal 19-64 Medium Risk (1 of 1 - PPSV23) 10/24/2002 DTaP/Tdap/Td series (1 - Tdap) 10/24/2004 PAP AKA CERVICAL CYTOLOGY 10/24/2004 Influenza Age 5 to Adult 2017 MEDICARE YEARLY EXAM 3/14/2018 HEMOGLOBIN A1C Q6M 2018 MICROALBUMIN Q1 2018 LIPID PANEL Q1 2018 FOOT EXAM Q1 2019 Allergies as of 3/22/2018  Review Complete On: 3/22/2018 By: Dre Cahto, LPN Severity Noted Reaction Type Reactions Other Plant, Animal, Environmental  2015    Sneezing Current Immunizations  Never Reviewed No immunizations on file. Not reviewed this visit You Were Diagnosed With   
  
 Codes Comments Type 2 diabetes mellitus with hyperglycemia, with long-term current use of insulin (HCC)    -  Primary ICD-10-CM: E11.65, Z79.4 ICD-9-CM: 250.00, 790.29, V58.67 Mixed hyperlipidemia     ICD-10-CM: E78.2 ICD-9-CM: 272.2 Vitals BP Pulse Height(growth percentile) Weight(growth percentile) BMI OB Status 113/78 (BP 1 Location: Left arm, BP Patient Position: Sitting) 98 5' 4\" (1.626 m) 196 lb 9.6 oz (89.2 kg) 33.75 kg/m2 Injection Smoking Status Never Smoker Vitals History BMI and BSA Data Body Mass Index Body Surface Area 33.75 kg/m 2 2.01 m 2 Preferred Pharmacy Pharmacy Name Phone [facility] [phone] Your Updated Medication List  
  
   
This list is accurate as of 3/22/18  2:02 PM.  Always use your most recent med list.  
  
  
  
  
 AMITIZA 24 mcg capsule Generic drug:  lubiPROStone Take 24 mcg by mouth two (2) times daily (with meals). ammonium lactate 12 % lotion Commonly known as:  LAC-HYDRIN Apply  to affected area two (2) times a day. rub in to affected area well  
  
 benzoyl peroxide 5 % external liquid Apply  to affected area daily as needed. busPIRone 10 mg tablet Commonly known as:  BUSPAR Take 15 mg by mouth two (2) times a day. cetirizine 10 mg tablet Commonly known as:  ZYRTEC Take 10 mg by mouth daily. clomiPRAMINE 75 mg capsule Commonly known as:  ANAFRANIL Take 225 mg by mouth nightly. clonazePAM 2 mg tablet Commonly known as:  Myriam Tirado Take 2 mg by mouth three (3) times daily. DEPO-PROVERA 150 mg/mL injection Generic drug:  medroxyPROGESTERone 1 mL by IntraMUSCular route every three (3) months. divalproex  mg tablet Commonly known as:  DEPAKOTE Take 1,000 mg by mouth daily. fluconazole 150 mg tablet Commonly known as:  DIFLUCAN Take 150 mg by mouth every thirty (30) days. FLUoxetine 20 mg capsule Commonly known as:  PROzac Take 20 mg by mouth daily. insulin glargine 100 unit/mL (3 mL) Inpn Commonly known as:  LANTUS SOLOSTAR U-100 INSULIN Inject 24 units at bed time insulin lispro 100 unit/mL kwikpen Commonly known as:  HUMALOG  
8 units before breakfast , 8 units before  lunch and 4 units before dinner No humalog or Novolog at [id number] mg Add 2 units 201-250 mg Add 4  units 251-300 mg Add 6 units 301-350 mg Add 8 units 351-400mg Add 10 units If sugars are more than 450 then take her to hospital  
  
 Insulin Needles (Disposable) 29 gauge x 1/2\" Ndle Commonly known as:  BD INSULIN PEN NEEDLE UF ORIG Use to inject insulin 4 times daily Dx Code: E11.65 LORazepam 1 mg tablet Commonly known as:  ATIVAN Take 1 mg by mouth as needed for Anxiety. metFORMIN 1,000 mg tablet Commonly known as:  GLUCOPHAGE  
TAKE 1 TABLET BY MOUTH IN THE MORNING AND IN THE EVENING WITH FOOD  
  
 minocycline 50 mg capsule Commonly known as:  Luly Mortimer Take 50 mg by mouth daily. naltrexone 50 mg tablet Commonly known as:  DEPADE Take 50 mg by mouth daily. polyethylene glycol 17 gram packet Commonly known as:  Wynette Harps Take 17 g by mouth as needed. risperiDONE 3 mg tablet Commonly known as:  RisperDAL Take 8 mg by mouth two (2) times a day. SEROquel  mg sr tablet Generic drug:  QUEtiapine SR Take 400 mg by mouth nightly. simvastatin 10 mg tablet Commonly known as:  ZOCOR Take 10 mg by mouth nightly. VITAMIN D3 1,000 unit Cap Generic drug:  cholecalciferol Take 1 Cap by mouth daily. We Performed the Following AMB POC GLUCOSE, QUANTITATIVE, BLOOD [65627 CPT(R)] AMB POC HEMOGLOBIN A1C [31624 CPT(R)] Patient Instructions Check blood sugars before meals and at bedtime. Maintain the log and bring it all your appointments No insulin  If glucose is less than 70 If she has low glucose which is less than 70 - give her 4 oz juice and recheck every 15 minutes until it is more than 100 If the bedtime sugars are less than 100 ,eat a 15 gm snack. 1800 Kcal diet , low carb , high protein Avoid juices and sodas Lantus insulin 24 units at bed time Novolog or Humalog 8 units before breakfast , 8 units before  lunch and 4 units before dinner No humalog or Novolog at bedtime Additional Novolog or Humalog for high sugars with meals 150-200 mg   Add 2 units 201-250 mg   Add 4  units 251-300 mg   Add 6 units 301-350 mg   Add 8 units 351-400 mg   Add 10 units If sugars are more than 450 then take her to hospital 
 
 
  
Introducing [address]! [facility] introduces Arpeggi patient portal. Now you can access parts of your medical record, email your doctor's office, and request medication refills online. 1. In your internet browser, go to [email or web address] 2. Click on the First Time User? Click Here link in the Sign In box. You will see the New Member Sign Up page. 3. Enter your Arpeggi Access Code exactly as it appears below. You will not need to use this code after youve completed the sign-up process. If you do not sign up before the expiration date, you must request a new code. · Arpe[employer]i Access Code: [email or web address] Expires: 4/29/2018  4:24 PM 
 
4. Enter the last four digits of your Social Security Number (xxxx) and Date of Birth (mm/dd/yyyy) as indicated and click Submit. You will be taken to the next sign-up page. 5. Create a Arpeggi ID. This will be your Arpeggi login ID and cannot be changed, so think of one that is secure and easy to remember. 6. Create a Arpeggi password. You can change your password at any time. 7. Enter your Password Reset Question and Answer. This can be used at a later time if you forget your password. 8. Enter your e-mail address. You will receive e-mail notification when new information is available in [address] 9. Click Sign Up. You can now view and download portions of your medical record. 10. Click the Download Summary menu link to download a portable copy of your medical information. If you have questions, please visit the Frequently Asked Questions section of the [employer]foot website. Remember, Blackfoot is NOT to be used for urgent needs. For medical emergencies, dial 911. Now available from your iPhone and Android! Please provide this summary of care documentation to your next provider. Your primary care clinician is listed as Mckenzie Kidd. If you have any questions after today's visit, please call [phone].

## 2018-03-23 ENCOUNTER — OFFICE VISIT (OUTPATIENT)
Dept: FAMILY MEDICINE CLINIC | Age: 33
End: 2018-03-23

## 2018-03-23 VITALS
WEIGHT: 196 LBS | TEMPERATURE: 97.9 F | SYSTOLIC BLOOD PRESSURE: 96 MMHG | DIASTOLIC BLOOD PRESSURE: 61 MMHG | HEART RATE: 76 BPM | RESPIRATION RATE: 18 BRPM | BODY MASS INDEX: 33.46 KG/M2 | HEIGHT: 64 IN | OXYGEN SATURATION: 98 %

## 2018-03-23 DIAGNOSIS — R82.90 MALODOROUS URINE: Primary | ICD-10-CM

## 2018-03-23 DIAGNOSIS — R46.89 CHANGE IN BEHAVIOR: ICD-10-CM

## 2018-03-23 RX ORDER — NITROFURANTOIN 25; 75 MG/1; MG/1
100 CAPSULE ORAL 2 TIMES DAILY
Qty: 10 CAP | Refills: 0 | Status: SHIPPED | OUTPATIENT
Start: 2018-03-23 | End: 2019-02-20 | Stop reason: ALTCHOICE

## 2018-03-23 NOTE — PROGRESS NOTES
Chief Complaint   Patient presents with   [facility].    Urinary Odor     she is a 27y.o. year old female who presents for evalution. New pt. Caregiver states for past 1-2 weeks has been having malodorous urine, lots of vaginal discharge. Behavioral outbursts. Pt will not allow any caregivers to help clean her vaginally or in private area. Caregiver states due to these issues unable to obtain urine sample or vaginal swab. Reviewed PmHx, RxHx, FmHx, SocHx, AllgHx and updated and dated in the chart. Review of Systems - negative except as listed above in the HPI    Objective:     Vitals:    03/23/18 1129   BP: 96/61   Pulse: 76   Resp: 18   Temp: 97.9 °F (36.6 °C)   TempSrc: Oral   SpO2: 98%   Weight: 196 lb (88.9 kg)   Height: 5' 4\" (1.626 m)     Physical Examination: General appearance - alert, well appearing, and in no distress, anxious and uncooperative, hitting caregiver   Chest - clear to auscultation, no wheezes, rales or rhonchi, symmetric air entry  Heart - normal rate, regular rhythm, normal S1, S2, no murmurs, rubs, clicks or gallops  Abdomen - soft, nontender, nondistended, no masses or organomegaly  no CVA tenderness    Assessment/ Plan:   Diagnoses and all orders for this visit:    1. Malodorous urine  -     nitrofurantoin, macrocrystal-monohydrate, (MACROBID) 100 mg capsule; Take 1 Cap by mouth two (2) times a day. New rx. Will cover pt today for infection but if problem persists we will need to figure out how to get urine sample. Pt instructed to take full course of antibiotics and increase water consumption. F/U if no improvement or develops fever/chills/nausea/vomiting. 2. Change in behavior  -     nitrofurantoin, macrocrystal-monohydrate, (MACROBID) 100 mg capsule; Take 1 Cap by mouth two (2) times a day. Hopefully secondary to UTI. Pt voiced understanding regarding plan of care. Follow-up Disposition:  Return if symptoms worsen or fail to improve.     I have discussed the diagnosis with the patient and the intended plan as seen in the above orders. The patient has received an after-visit summary and questions were answered concerning future plans.      Medication Side Effects and Warnings were discussed with patient    Abrahan Saucedo NP

## 2018-03-23 NOTE — PROGRESS NOTES
Pt here with group home counselor to establish care. Group home counselor states pts urine has had a foul odor x 1 week. Reports pt does having poor hygiene and refuses to have help with bathing.

## 2018-03-23 NOTE — MR AVS SNAPSHOT
[address] [address] 
[phone] Patient: Rhonda Solis MRN: [id number] :1985 Visit Information Date & Time Provider Department Dept. Phone Encounter #  
 3/23/2018 11:00 AM Chavo Villagomez, EMILE [address] [facility] [phone] [id number] Your Appointments 2018  1:30 PM  
ROUTINE CARE with Oliver Smyth MD  
[facility] [address] [place] [address]) Appt Note: 4 mon fu DM  
 [address] [facility] [address]  
[phone]  
  
   
 [address] [place] [address] Upcoming Health Maintenance Date Due  
 EYE EXAM RETINAL OR DILATED Q1 10/24/1993 Pneumococcal 19-64 Medium Risk (1 of 1 - PPSV23) 10/24/2002 DTaP/Tdap/Td series (1 - Tdap) 10/24/2004 PAP AKA CERVICAL CYTOLOGY 10/24/2004 Influenza Age 5 to Adult 2017 MEDICARE YEARLY EXAM 3/14/2018 HEMOGLOBIN A1C Q6M 2018 MICROALBUMIN Q1 2018 LIPID PANEL Q1 2018 FOOT EXAM Q1 2019 Allergies as of 3/23/2018  Review Complete On: 3/23/2018 By: Roseanne Zheng LPN Severity Noted Reaction Type Reactions Other Plant, Animal, Environmental  2015    Sneezing Current Immunizations  Never Reviewed No immunizations on file. Not reviewed this visit You Were Diagnosed With   
  
 Codes Comments Malodorous urine    -  Primary ICD-10-CM: R82.90 ICD-9-CM: 791.9 Vitals BP Pulse Temp Resp Height(growth percentile) Weight(growth percentile) 96/61 (BP 1 Location: Left arm, BP Patient Position: Sitting) 76 97.9 °F (36.6 °C) (Oral) 18 5' 4\" (1.626 m) 196 lb (88.9 kg) SpO2 BMI OB Status Smoking Status 98% 33.64 kg/m2 Injection Never Smoker Vitals History BMI and BSA Data Body Mass Index Body Surface Area  
 33.64 kg/m 2 2 m 2 Preferred Pharmacy Pharmacy Name Phone [facility] [address] [phone] Your Updated Medication List  
  
   
This list is accurate as of 3/23/18 11:39 AM.  Always use your most recent med list.  
  
  
  
  
 AMITIZA 24 mcg capsule Generic drug:  lubiPROStone Take 24 mcg by mouth two (2) times daily (with meals). ammonium lactate 12 % lotion Commonly known as:  LAC-HYDRIN Apply  to affected area two (2) times a day. rub in to affected area well  
  
 benzoyl peroxide 5 % external liquid Apply  to affected area daily as needed. busPIRone 10 mg tablet Commonly known as:  BUSPAR Take 15 mg by mouth two (2) times a day. cetirizine 10 mg tablet Commonly known as:  ZYRTEC Take 10 mg by mouth daily. clomiPRAMINE 75 mg capsule Commonly known as:  ANAFRANIL Take 225 mg by mouth nightly. clonazePAM 2 mg tablet Commonly known as:  Reno Halon Take 2 mg by mouth three (3) times daily. DEPO-PROVERA 150 mg/mL injection Generic drug:  medroxyPROGESTERone 1 mL by IntraMUSCular route every three (3) months. divalproex  mg tablet Commonly known as:  DEPAKOTE Take 1,000 mg by mouth daily. fluconazole 150 mg tablet Commonly known as:  DIFLUCAN Take 150 mg by mouth every thirty (30) days. FLUoxetine 20 mg capsule Commonly known as:  PROzac Take 20 mg by mouth daily. insulin glargine 100 unit/mL (3 mL) Inpn Commonly known as:  LANTUS SOLOSTAR U-100 INSULIN Inject 24 units at bed time  
  
 insulin lispro 100 unit/mL kwikpen Commonly known as:  HUMALOG  
8 units before breakfast , 8 units before  lunch and 4 units before dinner No humalog or Novolog at [id number] mg Add 2 units 201-250 mg Add 4  units 251-300 mg Add 6 units 301-350 mg Add 8 units 351-400mg Add 10 units If sugars are more than 450 then take her to hospital  
  
 Insulin Needles (Disposable) 29 gauge x 1/2\" Ndle Commonly known as:  BD INSULIN PEN NEEDLE UF ORIG Use to inject insulin 4 times daily Dx Code: E11.65 LORazepam 1 mg tablet Commonly known as:  ATIVAN Take 1 mg by mouth as needed for Anxiety. metFORMIN 1,000 mg tablet Commonly known as:  GLUCOPHAGE  
TAKE 1 TABLET BY MOUTH IN THE MORNING AND IN THE EVENING WITH FOOD  
  
 minocycline 50 mg capsule Commonly known as:  Sherol Pinks Take 50 mg by mouth daily. naltrexone 50 mg tablet Commonly known as:  DEPADE Take 50 mg by mouth daily. nitrofurantoin (macrocrystal-monohydrate) 100 mg capsule Commonly known as:  MACROBID Take 1 Cap by mouth two (2) times a day. polyethylene glycol 17 gram packet Commonly known as:  Ernestene Score Take 17 g by mouth as needed. risperiDONE 3 mg tablet Commonly known as:  RisperDAL Take 8 mg by mouth two (2) times a day. SEROquel  mg sr tablet Generic drug:  QUEtiapine SR Take 400 mg by mouth nightly. simvastatin 10 mg tablet Commonly known as:  ZOCOR Take 10 mg by mouth nightly. VITAMIN D3 1,000 unit Cap Generic drug:  cholecalciferol Take 1 Cap by mouth daily. Prescriptions Sent to Pharmacy Refills  
 nitrofurantoin, macrocrystal-monohydrate, (MACROBID) 100 mg capsule 0 Sig: Take 1 Cap by mouth two (2) times a day. Class: Normal  
 Pharmacy: [address] [place] [address] #: [phone] Route: Oral  
  
Patient Instructions Urinary Tract Infection in Women: Care Instructions Your Care Instructions A urinary tract infection, or UTI, is a general term for an infection anywhere between the kidneys and the urethra (where urine comes out). Most UTIs are bladder infections. They often cause pain or burning when you urinate. UTIs are caused by bacteria and can be cured with antibiotics. Be sure to complete your treatment so that the infection goes away. Follow-up care is a key part of your treatment and safety. Be sure to make and go to all appointments, and call your doctor if you are having problems. It's also a good idea to know your test results and keep a list of the medicines you take. How can you care for yourself at home? · Take your antibiotics as directed. Do not stop taking them just because you feel better. You need to take the full course of antibiotics. · Drink extra water and other fluids for the next day or two. This may help wash out the bacteria that are causing the infection. (If you have kidney, heart, or liver disease and have to limit fluids, talk with your doctor before you increase your fluid intake.) · Avoid drinks that are carbonated or have caffeine. They can irritate the bladder. · Urinate often. Try to empty your bladder each time. · To relieve pain, take a hot bath or lay a heating pad set on low over your lower belly or genital area. Never go to sleep with a heating pad in place. To prevent UTIs · Drink plenty of water each day. This helps you urinate often, which clears bacteria from your system. (If you have kidney, heart, or liver disease and have to limit fluids, talk with your doctor before you increase your fluid intake.) · Urinate when you need to. · Urinate right after you have sex. · Change sanitary pads often. · Avoid douches, bubble baths, feminine hygiene sprays, and other feminine hygiene products that have deodorants. · After going to the bathroom, wipe from front to back. When should you call for help? Call your doctor now or seek immediate medical care if: 
? · Symptoms such as fever, chills, nausea, or vomiting get worse or appear for the first time. ? · You have new pain in your back just below your rib cage. This is called flank pain. ? · There is new blood or pus in your urine. ? · You have any problems with your antibiotic medicine. ?Watch closely for changes in your health, and be sure to contact your doctor if: 
? · You are not getting better after taking an antibiotic for 2 days. ? · Your symptoms go away but then come back. Where can you learn more? Go to http://olivia-rohan.info/. Enter A232 in the search box to learn more about \"Urinary Tract Infection in Women: Care Instructions. \" Current as of: May 12, 2017 Content Version: 11.4 © 1505-4756 [employer]. Care instructions adapted under license by [employer] (which disclaims liability or warranty for this information). If you have questions about a medical condition or this instruction, always ask your healthcare professional. [address] any warranty or liability for your use of this information. Introducing [facility]! [facility] introduces [employer] patient portal. Now you can access parts of your medical record, email your doctor's office, and request medication refills online. 1. In your internet browser, go to [email or web address] 2. Click on the First Time User? Click Here link in the Sign In box. You will see the New Member Sign Up page. 3. Enter your [employer] Access Code exactly as it appears below. You will not need to use this code after youve completed the sign-up process. If you do not sign up before the expiration date, you must request a new code. · [employer] Access Code: [email or web address] Expires: 4/29/2018  4:24 PM 
 
4. Enter the last four digits of your Social Security Number (xxxx) and Date of Birth (mm/dd/yyyy) as indicated and click Submit. You will be taken to the next sign-up page. 5. Create a 8D World ID. This will be your [employer] login ID and cannot be changed, so think of one that is secure and easy to remember. 6. Create a [employer] password. You can change your password at any time. 7. Enter your Password Reset Question and Answer. This can be used at a later time if you forget your password. 8. Enter your e-mail address. You will receive e-mail notification when new information is available in [address] 9. Click Sign Up. You can now view and download portions of your medical record. 10. Click the Download Summary menu link to download a portable copy of your medical information. If you have questions, please visit the Frequently Asked Questions section of the [employer] website. Remember, [employer] Mail is NOT to be used for urgent needs. For medical emergencies, dial 911. Now available from your iPhone and Android! Please provide this summary of care documentation to your next provider. Your primary care clinician is listed as Durga Langston. If you have any questions after today's visit, please call [phone].

## 2018-03-23 NOTE — PATIENT INSTRUCTIONS

## 2018-04-25 ENCOUNTER — OFFICE VISIT (OUTPATIENT)
Dept: FAMILY MEDICINE CLINIC | Age: 33
End: 2018-04-25

## 2018-04-25 VITALS
RESPIRATION RATE: 16 BRPM | DIASTOLIC BLOOD PRESSURE: 88 MMHG | BODY MASS INDEX: 33.46 KG/M2 | HEART RATE: 100 BPM | SYSTOLIC BLOOD PRESSURE: 133 MMHG | OXYGEN SATURATION: 100 % | HEIGHT: 64 IN | WEIGHT: 196 LBS

## 2018-04-25 DIAGNOSIS — H60.92 OTITIS EXTERNA OF LEFT EAR, UNSPECIFIED CHRONICITY, UNSPECIFIED TYPE: Primary | ICD-10-CM

## 2018-04-25 RX ORDER — CEFDINIR 300 MG/1
300 CAPSULE ORAL 2 TIMES DAILY
Qty: 20 CAP | Refills: 0 | Status: SHIPPED | OUTPATIENT
Start: 2018-04-25 | End: 2018-05-05

## 2018-04-25 RX ORDER — NEOMYCIN SULFATE, POLYMYXIN B SULFATE AND HYDROCORTISONE 10; 3.5; 1 MG/ML; MG/ML; [USP'U]/ML
5 SUSPENSION/ DROPS AURICULAR (OTIC) 4 TIMES DAILY
Qty: 20 ML | Refills: 1 | Status: SHIPPED | OUTPATIENT
Start: 2018-04-25 | End: 2018-05-05

## 2018-04-25 NOTE — PROGRESS NOTES
Chief Complaint   Patient presents with    Ear Drainage     left ear X 2 days     Unable to obtain temp. Patient non-verbal and uncooperative. 1. Have you been to the ER, urgent care clinic since your last visit? Hospitalized since your last visit? No    2. Have you seen or consulted any other health care providers outside of the [address] [place] [address] since your last visit? Include any pap smears or colon screening. No            Chief Complaint   Patient presents with    Ear Drainage     left ear X 2 days     She is a 27 y.o. female who presents for evalution. Reviewed PmHx, RxHx, FmHx, SocHx, AllgHx and updated and dated in the chart. Patient Active Problem List    Diagnosis    Encounter for long-term (current) use of insulin ([facility] [address]    Non morbid obesity    Diabetic peripheral neuropathy ([facility] [address]    BMI 34.0-34.9,adult    Mixed hyperlipidemia    Obesity    Type II diabetes mellitus, uncontrolled ([facility] [address]    Active autistic disorder    Hypovitaminosis D       Review of Systems - negative except as listed above in the HPI    Objective:     Vitals:    04/25/18 1415   BP: 133/88   Pulse: 100   Resp: 16   SpO2: 100%   Weight: 196 lb (88.9 kg)   Height: 5' 4\" (1.626 m)     Physical Examination: General appearance - alert, well appearing, and in no distress  Ears - left ext canal with copious white dc    Assessment/ Plan:   Diagnoses and all orders for this visit:    1. Otitis externa of left ear, unspecified chronicity, unspecified type  -     neomycin-polymyxin-hydrocortisone, buffered, (PEDIOTIC) 3.5-10,000-1 mg/mL-unit/mL-% otic suspension; Administer 5 Drops into each ear four (4) times daily for 10 days. -     cefdinir (OMNICEF) 300 mg capsule; Take 1 Cap by mouth two (2) times a day for 10 days. Follow-up Disposition:  Return if symptoms worsen or fail to improve. I have discussed the diagnosis with the patient and the intended plan as seen in the above orders.   The patient understands and agrees with the plan. The patient has received an after-visit summary and questions were answered concerning future plans. Medication Side Effects and Warnings were discussed with patient  Patient Labs were reviewed and or requested:  Patient Past Records were reviewed and or requested    Wendy Villagran M.D. There are no Patient Instructions on file for this visit.

## 2018-04-25 NOTE — MR AVS SNAPSHOT
[address] [address] 
[phone] Patient: Governor Quiñones MRN: [id number] :1985 Visit Information Date & Time Provider Department Dept. Phone Encounter #  
 2018  1:40 PM Kerri Gates MD [address] [phone] [id number] Follow-up Instructions Return if symptoms worsen or fail to improve. Your Appointments 2018  1:30 PM  
ROUTINE CARE with Mookie Roldan MD  
[facility] [address] [place] [address]) Appt Note: 4 mon fu DM  
 3660 [facility] [address]  
[phone]  
  
   
 [address] [place] [address] Upcoming Health Maintenance Date Due  
 EYE EXAM RETINAL OR DILATED Q1 10/24/1993 Pneumococcal 19-64 Medium Risk (1 of 1 - PPSV23) 10/24/2002 DTaP/Tdap/Td series (1 - Tdap) 10/24/2004 PAP AKA CERVICAL CYTOLOGY 10/24/2004 Influenza Age 5 to Adult 2017 MEDICARE YEARLY EXAM 3/14/2018 HEMOGLOBIN A1C Q6M 2018 MICROALBUMIN Q1 2018 LIPID PANEL Q1 2018 FOOT EXAM Q1 2019 Allergies as of 2018  Review Complete On: 2018 By: Kerri Gates MD  
  
 Severity Noted Reaction Type Reactions Other Plant, Animal, Environmental  2015    Sneezing Current Immunizations  Never Reviewed No immunizations on file. Not reviewed this visit You Were Diagnosed With   
  
 Codes Comments Otitis externa of left ear, unspecified chronicity, unspecified type    -  Primary ICD-10-CM: H60.92 
ICD-9-CM: 380.10 Vitals BP Pulse Resp Height(growth percentile) Weight(growth percentile) SpO2  
 133/88 100 16 5' 4\" (1.626 m) 196 lb (88.9 kg) 100% BMI OB Status Smoking Status 33.64 kg/m2 Injection Never Smoker BMI and BSA Data Body Mass Index Body Surface Area  
 33.64 kg/m 2 2 m 2 Preferred Pharmacy Pharmacy Name Phone [facility] [place] [phone] Your Updated Medication List  
  
   
This list is accurate as of 4/25/18  2:42 PM.  Always use your most recent med list.  
  
  
  
  
 AMITIZA 24 mcg capsule Generic drug:  lubiPROStone Take 24 mcg by mouth two (2) times daily (with meals). ammonium lactate 12 % lotion Commonly known as:  LAC-HYDRIN Apply  to affected area two (2) times a day. rub in to affected area well  
  
 benzoyl peroxide 5 % external liquid Apply  to affected area daily as needed. busPIRone 10 mg tablet Commonly known as:  BUSPAR Take 15 mg by mouth two (2) times a day. cefdinir 300 mg capsule Commonly known as:  OMNICEF Take 1 Cap by mouth two (2) times a day for 10 days. cetirizine 10 mg tablet Commonly known as:  ZYRTEC Take 10 mg by mouth daily. clomiPRAMINE 75 mg capsule Commonly known as:  ANAFRANIL Take 225 mg by mouth nightly. clonazePAM 2 mg tablet Commonly known as:  Poole Kobs Take 2 mg by mouth three (3) times daily. DEPO-PROVERA 150 mg/mL injection Generic drug:  medroxyPROGESTERone 1 mL by IntraMUSCular route every three (3) months. divalproex  mg tablet Commonly known as:  DEPAKOTE Take 1,000 mg by mouth daily. fluconazole 150 mg tablet Commonly known as:  DIFLUCAN Take 150 mg by mouth every thirty (30) days. FLUoxetine 20 mg capsule Commonly known as:  PROzac Take 20 mg by mouth daily. insulin glargine 100 unit/mL (3 mL) Inpn Commonly known as:  LANTUS SOLOSTAR U-100 INSULIN Inject 24 units at bed time  
  
 insulin lispro 100 unit/mL kwikpen Commonly known as:  HUMALOG  
8 units before breakfast , 8 units before  lunch and 4 units before dinner No humalog or Novolog at [id number] mg Add 2 units 201-250 mg Add 4  units 251-300 mg Add 6 units 301-350 mg Add 8 units 351-400mg Add 10 units If sugars are more than 450 then take her to hospital  
  
 Insulin Needles (Disposable) 29 gauge x 1/2\" Ndle Commonly known as:  BD ULTRA-FINE ORIG PEN NEEDLE Use to inject insulin 4 times daily Dx Code: E11.65 LORazepam 1 mg tablet Commonly known as:  ATIVAN Take 1 mg by mouth as needed for Anxiety. metFORMIN 1,000 mg tablet Commonly known as:  GLUCOPHAGE  
TAKE 1 TABLET BY MOUTH IN THE MORNING AND IN THE EVENING WITH FOOD  
  
 minocycline 50 mg capsule Commonly known as:  Cleatus Susie Take 50 mg by mouth daily. naltrexone 50 mg tablet Commonly known as:  DEPADE Take 50 mg by mouth daily. neomycin-polymyxin-hydrocortisone (buffered) 3.5-10,000-1 mg/mL-unit/mL-% otic suspension Commonly known as:  Minnie Rice Administer 5 Drops into each ear four (4) times daily for 10 days. nitrofurantoin (macrocrystal-monohydrate) 100 mg capsule Commonly known as:  MACROBID Take 1 Cap by mouth two (2) times a day. polyethylene glycol 17 gram packet Commonly known as:  Lo Mule Take 17 g by mouth as needed. risperiDONE 3 mg tablet Commonly known as:  RisperDAL Take 8 mg by mouth two (2) times a day. SEROquel  mg sr tablet Generic drug:  QUEtiapine SR Take 400 mg by mouth nightly. simvastatin 10 mg tablet Commonly known as:  ZOCOR Take 10 mg by mouth nightly. VITAMIN D3 1,000 unit Cap Generic drug:  cholecalciferol Take 1 Cap by mouth daily. Prescriptions Sent to Pharmacy Refills  
 neomycin-polymyxin-hydrocortisone, buffered, (PEDIOTIC) 3.5-10,000-1 mg/mL-unit/mL-% otic suspension 1 Sig: Administer 5 Drops into each ear four (4) times daily for 10 days. Class: Normal  
 Pharmacy: [address] [place] [address] #: [phone] Route: Both Ears  
 cefdinir (OMNICEF) 300 mg capsule 0 Sig: Take 1 Cap by mouth two (2) times a day for 10 days. Class: Normal  
 [address]  #: [phone] Route: Oral  
  
Follow-up Instructions Return if symptoms worsen or fail to improve. Introducing [facility] & HEALTH SERVICES! [facility] introduces Impactia patient portal. Now you can access parts of your medical record, email your doctor's office, and request medication refills online. 1. In your internet browser, go to [email or web address] 2. Click on the First Time User? Click Here link in the Sign In box. You will see the New Member Sign Up page. 3. Enter your Impactia Access Code exactly as it appears below. You will not need to use this code after youve completed the sign-up process. If you do not sign up before the expiration date, you must request a new code. · Impactia Access Code: [email or web address] Expires: 4/29/2018  4:24 PM 
 
4. Enter the last four digits of your Social Security Number (xxxx) and Date of Birth (mm/dd/yyyy) as indicated and click Submit. You will be taken to the next sign-up page. 5. Create a Impactia ID. This will be your Impactia login ID and cannot be changed, so think of one that is secure and easy to remember. 6. Create a Impactia password. You can change your password at any time. 7. Enter your Password Reset Question and Answer. This can be used at a later time if you forget your password. 8. Enter your e-mail address. You will receive e-mail notification when new information is available in [address] 9. Click Sign Up. You can now view and download portions of your medical record. 10. Click the Download Summary menu link to download a portable copy of your medical information. If you have questions, please visit the Frequently Asked Questions section of the Impactia website. Remember, Impactia is NOT to be used for urgent needs. For medical emergencies, dial 911. Now available from your iPhone and Android! Please provide this summary of care documentation to your next provider. Your primary care clinician is listed as Eris Jaramillo. If you have any questions after today's visit, please call [phone].

## 2018-05-23 ENCOUNTER — OFFICE VISIT (OUTPATIENT)
Dept: FAMILY MEDICINE CLINIC | Age: 33
End: 2018-05-23

## 2018-05-23 VITALS
DIASTOLIC BLOOD PRESSURE: 83 MMHG | BODY MASS INDEX: 32.44 KG/M2 | TEMPERATURE: 99 F | RESPIRATION RATE: 16 BRPM | OXYGEN SATURATION: 99 % | HEIGHT: 64 IN | HEART RATE: 121 BPM | WEIGHT: 190 LBS | SYSTOLIC BLOOD PRESSURE: 115 MMHG

## 2018-05-23 DIAGNOSIS — N94.6 DYSMENORRHEA: Primary | ICD-10-CM

## 2018-05-23 NOTE — PROGRESS NOTES
1. Have you been to the ER, urgent care clinic since your last visit? Hospitalized since your last visit? No    2. Have you seen or consulted any other health care providers outside of the [address] [place] [address] since your last visit? Include any pap smears or colon screening.  No     Chief Complaint   Patient presents with    Depo
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
## 2018-06-07 ENCOUNTER — OFFICE VISIT (OUTPATIENT)
Dept: ENDOCRINOLOGY | Age: 33
End: 2018-06-07

## 2018-06-07 VITALS
BODY MASS INDEX: 31.91 KG/M2 | HEART RATE: 136 BPM | WEIGHT: 186.9 LBS | DIASTOLIC BLOOD PRESSURE: 81 MMHG | SYSTOLIC BLOOD PRESSURE: 114 MMHG | OXYGEN SATURATION: 97 % | RESPIRATION RATE: 14 BRPM | HEIGHT: 64 IN

## 2018-06-07 DIAGNOSIS — E66.9 NON MORBID OBESITY: ICD-10-CM

## 2018-06-07 DIAGNOSIS — E11.65 TYPE 2 DIABETES MELLITUS WITH HYPERGLYCEMIA, WITH LONG-TERM CURRENT USE OF INSULIN (HCC): Primary | ICD-10-CM

## 2018-06-07 DIAGNOSIS — Z79.4 TYPE 2 DIABETES MELLITUS WITH HYPERGLYCEMIA, WITH LONG-TERM CURRENT USE OF INSULIN (HCC): Primary | ICD-10-CM

## 2018-06-07 DIAGNOSIS — E78.2 MIXED HYPERLIPIDEMIA: ICD-10-CM

## 2018-06-07 NOTE — MR AVS SNAPSHOT
[address] [place] [address] 
[phone] Patient: Mayda Rm MRN: [id number] :1985 Visit Information Date & Time Provider Department Dept. Phone Encounter #  
 2018 11:00 AM Christie Fan MD [facility] [phone] [id number] Follow-up Instructions Return in about 4 months (around 10/7/2018). Upcoming Health Maintenance Date Due  
 EYE EXAM RETINAL OR DILATED Q1 10/24/1993 Pneumococcal 19-64 Medium Risk (1 of 1 - PPSV23) 10/24/2002 DTaP/Tdap/Td series (1 - Tdap) 10/24/2004 PAP AKA CERVICAL CYTOLOGY 10/24/2004 MEDICARE YEARLY EXAM 3/14/2018 Influenza Age 5 to Adult 2018 HEMOGLOBIN A1C Q6M 2018 MICROALBUMIN Q1 2018 LIPID PANEL Q1 2018 FOOT EXAM Q1 2019 Allergies as of 2018  Review Complete On: 2018 By: Sánchez Donahue LPN Severity Noted Reaction Type Reactions Other Plant, Animal, Environmental  2015    Sneezing Current Immunizations  Never Reviewed No immunizations on file. Not reviewed this visit Vitals BP Pulse Resp Height(growth percentile) Weight(growth percentile) SpO2  
 114/81 (BP 1 Location: Left arm, BP Patient Position: Sitting) (!) 136 14 5' 4\" (1.626 m) 186 lb 14.4 oz (84.8 kg) 97% BMI OB Status Smoking Status 32.08 kg/m2 Injection Never Smoker Vitals History BMI and BSA Data Body Mass Index Body Surface Area 32.08 kg/m 2 1.96 m 2 Preferred Pharmacy Pharmacy Name Phone [facility] [address] [phone] Your Updated Medication List  
  
   
This list is accurate as of 18 11:33 AM.  Always use your most recent med list.  
  
  
  
  
 AMITIZA 24 mcg capsule Generic drug:  lubiPROStone Take 24 mcg by mouth two (2) times daily (with meals). ammonium lactate 12 % lotion Commonly known as:  LAC-HYDRIN Apply  to affected area two (2) times a day. rub in to affected area well  
  
 benzoyl peroxide 5 % external liquid Apply  to affected area daily as needed. busPIRone 10 mg tablet Commonly known as:  BUSPAR Take 15 mg by mouth two (2) times a day. cetirizine 10 mg tablet Commonly known as:  ZYRTEC Take 10 mg by mouth daily. clomiPRAMINE 75 mg capsule Commonly known as:  ANAFRANIL Take 225 mg by mouth nightly. clonazePAM 2 mg tablet Commonly known as:  Alonza Madrid Take 2 mg by mouth three (3) times daily. DEPO-PROVERA 150 mg/mL injection Generic drug:  medroxyPROGESTERone 1 mL by IntraMUSCular route every three (3) months. divalproex  mg tablet Commonly known as:  DEPAKOTE Take 1,000 mg by mouth daily. fluconazole 150 mg tablet Commonly known as:  DIFLUCAN Take 150 mg by mouth every thirty (30) days. FLUoxetine 20 mg capsule Commonly known as:  PROzac Take 20 mg by mouth daily. insulin glargine 100 unit/mL (3 mL) Inpn Commonly known as:  LANTUS SOLOSTAR U-100 INSULIN Inject 24 units at bed time  
  
 insulin lispro 100 unit/mL kwikpen Commonly known as:  HUMALOG  
8 units before breakfast , 8 units before  lunch and 4 units before dinner No humalog or Novolog at [id number] mg Add 2 units 201-250 mg Add 4  units 251-300 mg Add 6 units 301-350 mg Add 8 units 351-400mg Add 10 units If sugars are more than 450 then take her to hospital  
  
 Insulin Needles (Disposable) 29 gauge x 1/2\" Ndle Commonly known as:  BD ULTRA-FINE ORIG PEN NEEDLE Use to inject insulin 4 times daily Dx Code: E11.65 LORazepam 1 mg tablet Commonly known as:  ATIVAN Take 1 mg by mouth as needed for Anxiety. metFORMIN 1,000 mg tablet Commonly known as:  GLUCOPHAGE  
TAKE 1 TABLET BY MOUTH IN THE MORNING AND IN THE EVENING WITH FOOD minocycline 50 mg capsule Commonly known as:  Maximiliano Gudelia Take 50 mg by mouth daily. naltrexone 50 mg tablet Commonly known as:  DEPADE Take 50 mg by mouth daily. nitrofurantoin (macrocrystal-monohydrate) 100 mg capsule Commonly known as:  MACROBID Take 1 Cap by mouth two (2) times a day. polyethylene glycol 17 gram packet Commonly known as:  Court Hugger Take 17 g by mouth as needed. risperiDONE 3 mg tablet Commonly known as:  RisperDAL Take 8 mg by mouth two (2) times a day. SEROquel  mg sr tablet Generic drug:  QUEtiapine SR Take 400 mg by mouth nightly. simvastatin 10 mg tablet Commonly known as:  ZOCOR Take 10 mg by mouth nightly. VITAMIN D3 1,000 unit Cap Generic drug:  cholecalciferol Take 1 Cap by mouth daily. Follow-up Instructions Return in about 4 months (around 10/7/2018). Patient Instructions Check blood sugars before meals and at bedtime. Maintain the log and bring it all your appointments No insulin  If glucose is less than 70 If she has low glucose which is less than 70 - give her 4 oz juice and recheck every 15 minutes until it is more than 100 If the bedtime sugars are less than 100 ,eat a 15 gm snack. 1800 Kcal diet , low carb , high protein Avoid juices and sodas Lantus insulin 24 units at bed time Novolog or Humalog 8 units before breakfast , 8 units before  lunch and 4 units before dinner No humalog or Novolog at bedtime Additional Novolog or Humalog for high sugars with meals 150-200 mg   Add 2 units 201-250 mg   Add 4  units 251-300 mg   Add 6 units 301-350 mg   Add 8 units 351-400 mg   Add 10 units If sugars are more than 450 then take her to hospital 
 
 
  
Introducing \[facility]\"" & HEALTH SERVICES!    
 [employer] introduces [employer] patient portal. Now you can access parts of your medical record, email your doctor's office, and request medication refills online. 1. In your internet browser, go to [email or web address] 2. Click on the First Time User? Click Here link in the Sign In box. You will see the New Member Sign Up page. 3. Enter your Factor.io Access Code exactly as it appears below. You will not need to use this code after youve completed the sign-up process. If you do not sign up before the expiration date, you must request a new code. · Factor.io Access Code: [email or web address] Expires: 8/21/2018  4:07 PM 
 
4. Enter the last four digits of your Social Security Number (xxxx) and Date of Birth (mm/dd/yyyy) as indicated and click Submit. You will be taken to the next sign-up page. 5. Create a Factor.io ID. This will be your Factor.io login ID and cannot be changed, so think of one that is secure and easy to remember. 6. Create a Factor.io password. You can change your password at any time. 7. Enter your Password Reset Question and Answer. This can be used at a later time if you forget your password. 8. Enter your e-mail address. You will receive e-mail notification when new information is available in [address] 9. Click Sign Up. You can now view and download portions of your medical record. 10. Click the Download Summary menu link to download a portable copy of your medical information. If you have questions, please visit the Frequently Asked Questions section of the Factor.io website. Remember, Factor.io is NOT to be used for urgent needs. For medical emergencies, dial 911. Now available from your iPhone and Android! Please provide this summary of care documentation to your next provider. Your primary care clinician is listed as Jhonny Mullins. If you have any questions after today's visit, please call [phone].

## 2018-06-07 NOTE — PROGRESS NOTES
Janene Stanton is a 27 y.o. female here for   Chief Complaint   Patient presents with    Diabetes       Functional glucose monitor and record keeping system? - yes  Eye exam within last year? -   Foot exam within last year? - on file    1. Have you been to the ER, urgent care clinic since your last visit? Hospitalized since your last visit? - no    2. Have you seen or consulted any other health care providers outside of the [facility] since your last visit?   Include any pap smears or colon screening.-

## 2018-06-08 NOTE — PROGRESS NOTES
Cesar Venegas MD             Patient Information  Date:6/7/2018  Name : Dania Matson 27 y.o.     YOB: 1985         Referred by:  Group home       Chief Complaint   Patient presents with    Diabetes       History of Present Illness: Dania Matson is a 27 y.o. female here for fu of  Type 2 Diabetes Mellitus. She is on basal bolus regimen  No hypoglycemia, no hospitalization  There is a pattern of hyperglycemia mostly on the weekends  which was discussed with the caretaker who was with the patient, suspect the weekend staff is giving her more carbs than she needs. Blood glucose is still fluctuating, the only way to calm her is to give the  food is what I have learned and I suspect some of the staff members are giving her snacks whenever she is agitated.       She lives in a Group home due to underlying Psychiatric condition     Here with care taker , does not communicate  Monitoring frequency:3 /day         Wt Readings from Last 3 Encounters:   06/07/18 186 lb 14.4 oz (84.8 kg)   05/23/18 190 lb (86.2 kg)   04/25/18 196 lb (88.9 kg)       BP Readings from Last 3 Encounters:   06/07/18 114/81   05/23/18 115/83   04/25/18 133/88           Past Medical History:   Diagnosis Date    Autism     Diabetes (HCC)     Intermittent explosive disorder      Current Outpatient Prescriptions   Medication Sig    insulin lispro (HUMALOG) 100 unit/mL kwikpen 8 units before breakfast , 8 units before  lunch and 4 units before dinner No humalog or Novolog at [id number] mg Add 2 units 201-250 mg  Add 4  units 251-300 mg Add 6 units 301-350 mg Add 8 units 351-400mg Add 10 units If sugars are more than 450 then take her to hospital    metFORMIN (GLUCOPHAGE) 1,000 mg tablet TAKE 1 TABLET BY MOUTH IN THE MORNING AND IN THE EVENING WITH FOOD    Insulin Needles, Disposable, (BD INSULIN PEN NEEDLE UF ORIG) 29 gauge x 1/2\" ndle Use to inject insulin 4 times daily Dx Code: E11.65    insulin glargine (LANTUS SOLOSTAR) 100 unit/mL (3 mL) inpn Inject 24 units at bed time    nitrofurantoin, macrocrystal-monohydrate, (MACROBID) 100 mg capsule Take 1 Cap by mouth two (2) times a day.  LORazepam (ATIVAN) 1 mg tablet Take 1 mg by mouth as needed for Anxiety.  busPIRone (BUSPAR) 10 mg tablet Take 15 mg by mouth two (2) times a day.  polyethylene glycol (MIRALAX) 17 gram packet Take 17 g by mouth as needed.  benzoyl peroxide 5 % external liquid Apply  to affected area daily as needed.  minocycline (MINOCIN, DYNACIN) 50 mg capsule Take 50 mg by mouth daily.  ammonium lactate (LAC-HYDRIN) 12 % lotion Apply  to affected area two (2) times a day. rub in to affected area well    QUEtiapine SR (SEROQUEL XR) 300 mg sr tablet Take 400 mg by mouth nightly.  clomiPRAMINE (ANAFRANIL) 75 mg capsule Take 225 mg by mouth nightly.  clonazePAM (KLONOPIN) 2 mg tablet Take 2 mg by mouth three (3) times daily.  cetirizine (ZYRTEC) 10 mg tablet Take 10 mg by mouth daily.  simvastatin (ZOCOR) 10 mg tablet Take 10 mg by mouth nightly.  divalproex DR (DEPAKOTE) 500 mg tablet Take 1,000 mg by mouth daily.  naltrexone (DEPADE) 50 mg tablet Take 50 mg by mouth daily.  FLUoxetine (PROZAC) 20 mg capsule Take 20 mg by mouth daily.  risperiDONE (RISPERDAL) 3 mg tablet Take 8 mg by mouth two (2) times a day.  medroxyPROGESTERone (DEPO-PROVERA) 150 mg/mL injection 1 mL by IntraMUSCular route every three (3) months.  fluconazole (DIFLUCAN) 150 mg tablet Take 150 mg by mouth every thirty (30) days.  Cholecalciferol, Vitamin D3, (VITAMIN D3) 1,000 unit cap Take 1 Cap by mouth daily.  lubiPROStone (AMITIZA) 24 mcg capsule Take 24 mcg by mouth two (2) times daily (with meals). No current facility-administered medications for this visit.       Allergies   Allergen Reactions    Other Plant, Animal, Environmental Sneezing         Review of Systems: unable to obtain due to underlying mental status  -     Physical Examination:   Blood pressure 114/81, pulse (!) 136, resp. rate 14, height 5' 4\" (1.626 m), weight 186 lb 14.4 oz (84.8 kg), SpO2 97 %. Estimated body mass index is 32.08 kg/(m^2) as calculated from the following:    Height as of this encounter: 5' 4\" (1.626 m). -   Weight as of this encounter: 186 lb 14.4 oz (84.8 kg). - General: no distress  - HEENT:  no periorbital edema,   - Neck: supple, no thyromegaly  - Cardiovascular: regular, normal rate, normal S1 and S2,   - Respiratory: clear to auscultation bilaterally  - Gastrointestinal: soft, nontender, nondistended,  BS +  - Neurological:alert and not oriented      - Psychiatric: restless  - Skin: color, texture, turgor normal.     Diabetic foot exam: Jan 2018     Left:     Vibratory sensation ND - non communicative    Filament test  ND   Pulse DP: 1+    Deformities: callus   Right:    Vibratory sensation  ND    Filament test -noncommunicative   Pulse DP: 1+   Deformities: callus      . Data Reviewed:     [] Glucose records reviewed. [] See glucose records for details (to be scanned).   [] A1C  [] Reviewed labs    Lab Results   Component Value Date/Time    Hemoglobin A1c 6.9 (H) 05/13/2016 10:54 AM    Hemoglobin A1c 7.4 (H) 02/17/2016 04:23 PM    Hemoglobin A1c 8.7 (H) 06/12/2015 02:50 PM    Glucose 90 05/13/2016 10:54 AM    Glucose  03/22/2018 01:41 PM    Microalb/Creat ratio (ug/mg creat.) <6.7 05/13/2016 10:54 AM    LDL, calculated 51 05/13/2016 10:54 AM    Creatinine 0.69 05/13/2016 10:54 AM    Hemoglobin A1c, External 7.5 12/16/2017    Hemoglobin A1c, External 8.7 09/24/2015    Hemoglobin A1c, External 8.0 09/22/2015      Lab Results   Component Value Date/Time    GFR est  05/13/2016 10:54 AM    GFR est non- 05/13/2016 10:54 AM    Creatinine 0.69 05/13/2016 10:54 AM    BUN 7 05/13/2016 10:54 AM    Sodium 142 05/13/2016 10:54 AM    Potassium 4.3 05/13/2016 10:54 AM    Chloride 101 05/13/2016 10:54 AM    CO2 17 (L) 05/13/2016 10:54 AM        Assessment/Plan:     1. Type 2 diabetes mellitus with hyperglycemia, with long-term current use of insulin ([address]    2. Mixed hyperlipidemia        1. Type 2 Diabetes Mellitus   Lab Results   Component Value Date/Time    Hemoglobin A1c 6.9 (H) 05/13/2016 10:54 AM    Hemoglobin A1c (POC) 7.4 03/22/2018 01:42 PM    Hemoglobin A1c, External 7.5 12/16/2017     After adjusting the insulin hyperglycemia is better but cannot continue insulin progressively to cover for the meals if carbs are not controlled  There is a pattern of hyperglycemia mostly on the weekends  which was discussed with the caretaker who was with the patient, suspect the weekend staff is giving her more carbs than she needs. Due to increase in her psychotropic medications she is also hungry and constantly asking for the food, reportedly gets agitated when she does not get something to eat. Blood glucose is fluctuating 1 the glucose is 250 and the very next day it is in the 90s    Insulin adjusted   metformin  Lantus insulin 24 units at bed time  Novolog or Humalog  with meals     Written instructions given  Advised to check glucose  4 times daily      2. HTN :  cannot tolerate ACE    3. Hyperlipidemia : Continue statin. 4.Obesity:Body mass index is 32.08 kg/(m^2). Diet managed by Group home      Caregiver verbalized understanding      Patient Instructions   Check blood sugars before meals and at bedtime. Maintain the log and bring it all your appointments      No insulin  If glucose is less than 70     If she has low glucose which is less than 70 - give her 4 oz juice and recheck every 15 minutes until it is more than 100     If the bedtime sugars are less than 100 ,eat a 15 gm snack.     1800 Kcal diet , low carb , high protein    Avoid juices and sodas     Lantus insulin 24 units at bed time     Novolog or Humalog 8 units before breakfast , 8 units before  lunch and 4 units before dinner   No humalog or Novolog at bedtime        Additional Novolog or Humalog for high sugars with meals     150-200 mg   Add 2 units     201-250 mg   Add 4  units     251-300 mg   Add 6 units     301-350 mg   Add 8 units     351-400 mg   Add 10 units         If sugars are more than 450 then take her to hospital    Follow-up Disposition:  Return in about 4 months (around 10/7/2018). Thank you for allowing me to participate in the care of this patient.     Christene Goodpasture, MD

## 2018-06-13 ENCOUNTER — TELEPHONE (OUTPATIENT)
Dept: FAMILY MEDICINE CLINIC | Age: 33
End: 2018-06-13

## 2018-06-13 RX ORDER — CROMOLYN SODIUM 40 MG/ML
1 SOLUTION/ DROPS OPHTHALMIC
Qty: 10 ML | Refills: 1 | Status: SHIPPED | OUTPATIENT
Start: 2018-06-13 | End: 2022-06-10

## 2018-06-13 RX ORDER — FLUTICASONE PROPIONATE 50 MCG
2 SPRAY, SUSPENSION (ML) NASAL DAILY
Qty: 1 BOTTLE | Refills: 3 | Status: SHIPPED | OUTPATIENT
Start: 2018-06-13 | End: 2019-09-17 | Stop reason: SDUPTHER

## 2018-06-13 NOTE — TELEPHONE ENCOUNTER
[facility] home staff is stateing pt is having some allergy problems and is asking if you could call something in for her before she comes in to be seen. States she has bad behavior problems when she comes in here and they want to get her on something for her allergies before bringing her in? If so please send to nguyễn. Call back number for Emily Gonzalez is [phone]. Thanks.

## 2018-06-13 NOTE — TELEPHONE ENCOUNTER
What types of allergy symptoms is pt having? Runny nose, cough, eyes itching, etc.  Happy to send in prescription without her being seen, just need to know what to treat. Also, is she still taking Zyrtec daily?    Thanks,  N

## 2018-06-13 NOTE — TELEPHONE ENCOUNTER
Caregiver stated that pt is having itching eyes and throat. Pt is taking Zyrtec daily. Caregiver will like rx called into pharmacy on file.

## 2018-06-25 RX ORDER — CETIRIZINE HCL 10 MG
TABLET ORAL
Qty: 30 TAB | Status: SHIPPED | OUTPATIENT
Start: 2018-06-25 | End: 2020-05-15 | Stop reason: SDUPTHER

## 2018-06-25 RX ORDER — SIMVASTATIN 10 MG/1
TABLET, FILM COATED ORAL
Qty: 30 TAB | Status: SHIPPED | OUTPATIENT
Start: 2018-06-25 | End: 2019-05-13 | Stop reason: SDUPTHER

## 2018-06-26 RX ORDER — FLUCONAZOLE 150 MG/1
TABLET ORAL
Qty: 1 TAB | Status: SHIPPED | OUTPATIENT
Start: 2018-06-26 | End: 2019-05-22 | Stop reason: SDUPTHER

## 2018-06-26 RX ORDER — LUBIPROSTONE 24 UG/1
CAPSULE, GELATIN COATED ORAL
Qty: 60 CAP | Status: SHIPPED | OUTPATIENT
Start: 2018-06-26 | End: 2019-05-13 | Stop reason: SDUPTHER

## 2018-07-18 RX ORDER — MELATONIN
Qty: 31 TAB | Status: SHIPPED | OUTPATIENT
Start: 2018-07-18 | End: 2019-07-11 | Stop reason: SDUPTHER

## 2018-07-25 ENCOUNTER — OFFICE VISIT (OUTPATIENT)
Dept: FAMILY MEDICINE CLINIC | Age: 33
End: 2018-07-25

## 2018-07-25 ENCOUNTER — HOSPITAL ENCOUNTER (OUTPATIENT)
Dept: LAB | Age: 33
Discharge: HOME | End: 2018-07-25
Payer: MEDICARE

## 2018-07-25 VITALS
SYSTOLIC BLOOD PRESSURE: 122 MMHG | TEMPERATURE: 98.2 F | HEIGHT: 64 IN | HEART RATE: 129 BPM | RESPIRATION RATE: 16 BRPM | WEIGHT: 188 LBS | DIASTOLIC BLOOD PRESSURE: 86 MMHG | BODY MASS INDEX: 32.1 KG/M2 | OXYGEN SATURATION: 99 %

## 2018-07-25 DIAGNOSIS — I10 ESSENTIAL HYPERTENSION: ICD-10-CM

## 2018-07-25 DIAGNOSIS — R00.0 TACHYCARDIA: Primary | ICD-10-CM

## 2018-07-25 PROCEDURE — 84443 ASSAY THYROID STIM HORMONE: CPT

## 2018-07-25 PROCEDURE — 80053 COMPREHEN METABOLIC PANEL: CPT

## 2018-07-25 PROCEDURE — 85025 COMPLETE CBC W/AUTO DIFF WBC: CPT

## 2018-07-25 RX ORDER — METOPROLOL SUCCINATE 25 MG/1
25 TABLET, EXTENDED RELEASE ORAL DAILY
Qty: 30 TAB | Refills: 1 | Status: SHIPPED | OUTPATIENT
Start: 2018-07-25 | End: 2018-09-14 | Stop reason: SDUPTHER

## 2018-07-25 NOTE — PROGRESS NOTES
1. Have you been to the ER, urgent care clinic since your last visit? Hospitalized since your last visit? No    2. Have you seen or consulted any other health care providers outside of the [address] [place] [address] since your last visit? Include any pap smears or colon screening.  No     Chief Complaint   Patient presents with    Blood Pressure Check     Pluse Rate High, x1 weeks

## 2018-07-25 NOTE — MR AVS SNAPSHOT
[address] [address] 
[phone] Patient: Ileana August MRN: [id number] :[id number] Visit Information Date & Time Provider Department Dept. Phone Encounter #  
 2018  1:45 PM Brien Buchanan NP [facility] [phone] [id number] Follow-up Instructions Return in about 1 month (around 2018). Your Appointments 8/15/2018  1:15 PM  
IMMUNIZATIONS/INJECTIONS with Brien Buchanan NP [facility] ([address]) Appt Note: depo N [address] [place] [address]  
[phone]  
  
   
 N [address] [place] [address]  
  
    
 10/10/2018  1:45 PM  
ROUTINE CARE with Yvon Chambers MD  
[facility]) Appt Note: F/U routine 4 months [address] [facility] [place] [address]  
[phone]  
  
   
 Amauri Dickey [facility] [address] [place] [address] Upcoming Health Maintenance Date Due  
 EYE EXAM RETINAL OR DILATED Q1 10/24/1993 Pneumococcal 19-64 Medium Risk (1 of 1 - PPSV23) 10/24/2002 DTaP/Tdap/Td series (1 - Tdap) 10/24/2004 PAP AKA CERVICAL CYTOLOGY 10/24/2004 MEDICARE YEARLY EXAM 3/14/2018 Influenza Age 5 to Adult 2018 HEMOGLOBIN A1C Q6M 2018 MICROALBUMIN Q1 2018 LIPID PANEL Q1 2018 FOOT EXAM Q1 2019 Allergies as of 2018  Review Complete On: 2018 By: Stella Carballo LPN Severity Noted Reaction Type Reactions Other Plant, Animal, Environmental  2015    Sneezing Current Immunizations  Never Reviewed No immunizations on file. Not reviewed this visit You Were Diagnosed With   
  
 Codes Comments Tachycardia    -  Primary ICD-10-CM: R00.0 ICD-9-CM: 785.0 Essential hypertension     ICD-10-CM: I10 
ICD-9-CM: 401.9 Vitals BP Pulse Temp Resp Height(growth percentile) Weight(growth percentile) 122/86 (!) 129 98.2 °F (36.8 °C) (Oral) 16 5' 4\" (1.626 m) 188 lb (85.3 kg) SpO2 BMI OB Status Smoking Status 99% 32.27 kg/m2 Injection Never Smoker BMI and BSA Data Body Mass Index Body Surface Area  
 32.27 kg/m 2 1.96 m 2 Preferred Pharmacy Pharmacy Name Phone [facility] [address] [phone] Your Updated Medication List  
  
   
This list is accurate as of 7/25/18  2:20 PM.  Always use your most recent med list.  
  
  
  
  
 AMITIZA 24 mcg capsule Generic drug:  lubiPROStone TAKE 1 CAPSULE BY MOUTH TWICE DAILY WITH MEALS  
  
 ammonium lactate 12 % lotion Commonly known as:  LAC-HYDRIN Apply  to affected area two (2) times a day. rub in to affected area well  
  
 benzoyl peroxide 5 % external liquid Apply  to affected area daily as needed. busPIRone 10 mg tablet Commonly known as:  BUSPAR Take 15 mg by mouth two (2) times a day. cetirizine 10 mg tablet Commonly known as:  ZYRTEC  
TAKE 1 TABLET BY MOUTH DAILY FOR ALLERGIES  
  
 clomiPRAMINE 75 mg capsule Commonly known as:  ANAFRANIL Take 225 mg by mouth nightly. clonazePAM 2 mg tablet Commonly known as:  Winnie Patch Take 2 mg by mouth three (3) times daily. cromolyn 4 % ophthalmic solution Commonly known as:  OPTICROM Administer 1 Drop to both eyes four (4) times daily as needed. For itchy, watery eyes DEPO-PROVERA 150 mg/mL injection Generic drug:  medroxyPROGESTERone 1 mL by IntraMUSCular route every three (3) months. divalproex  mg tablet Commonly known as:  DEPAKOTE Take 1,000 mg by mouth daily. fluconazole 150 mg tablet Commonly known as:  DIFLUCAN  
TAKE 1 TABLET BY MOUTH ONCE A MONTH ON 15TH FLUoxetine 20 mg capsule Commonly known as:  PROzac Take 20 mg by mouth daily. fluticasone 50 mcg/actuation nasal spray Commonly known as:  Lella Solum 2 Sprays by Both Nostrils route daily. insulin glargine 100 unit/mL (3 mL) Inpn Commonly known as:  LANTUS SOLOSTAR U-100 INSULIN Inject 24 units at bed time  
  
 insulin lispro 100 unit/mL kwikpen Commonly known as:  HUMALOG  
8 units before breakfast , 8 units before  lunch and 4 units before dinner No humalog or Novolog at [id number] mg Add 2 units 201-250 mg Add 4  units 251-300 mg Add 6 units 301-350 mg Add 8 units 351-400mg Add 10 units If sugars are more than 450 then take her to hospital  
  
 Insulin Needles (Disposable) 29 gauge x 1/2\" Ndle Commonly known as:  BD ULTRA-FINE ORIG PEN NEEDLE Use to inject insulin 4 times daily Dx Code: E11.65 LORazepam 1 mg tablet Commonly known as:  ATIVAN Take 1 mg by mouth as needed for Anxiety. metFORMIN 1,000 mg tablet Commonly known as:  GLUCOPHAGE  
TAKE 1 TABLET BY MOUTH IN THE MORNING AND IN THE EVENING WITH FOOD  
  
 metoprolol succinate 25 mg XL tablet Commonly known as:  TOPROL-XL Take 1 Tab by mouth daily. minocycline 50 mg capsule Commonly known as:  Toni Deja Take 50 mg by mouth daily. naltrexone 50 mg tablet Commonly known as:  DEPADE Take 50 mg by mouth daily. nitrofurantoin (macrocrystal-monohydrate) 100 mg capsule Commonly known as:  MACROBID Take 1 Cap by mouth two (2) times a day. polyethylene glycol 17 gram packet Commonly known as:  [place] Take 17 g by mouth as needed. risperiDONE 3 mg tablet Commonly known as:  RisperDAL Take 8 mg by mouth two (2) times a day. SEROquel  mg sr tablet Generic drug:  QUEtiapine SR Take 400 mg by mouth nightly. simvastatin 10 mg tablet Commonly known as:  ZOCOR  
TAKE 1 TABLET BY MOUTH AT BEDTIME FOR CHOLESTEROL * VITAMIN D3 1,000 unit Cap Generic drug:  cholecalciferol Take 1 Cap by mouth daily. * cholecalciferol 1,000 unit tablet Commonly known as:  VITAMIN D3  
TAKE 1 TABLET BY MOUTH DAILY * Notice: This list has 2 medication(s) that are the same as other medications prescribed for you. Read the directions carefully, and ask your doctor or other care provider to review them with you. Prescriptions Sent to Pharmacy Refills  
 metoprolol succinate (TOPROL-XL) 25 mg XL tablet 1 Sig: Take 1 Tab by mouth daily. Class: Normal  
 Pharmacy: [address] [place], [place] [id number] #: [phone] Route: Oral  
  
We Performed the Following CBC WITH AUTOMATED DIFF [07359 CPT(R)] METABOLIC PANEL, COMPREHENSIVE [22154 CPT(R)] REFERRAL TO CARDIOLOGY [UEZ75 Custom] TSH 3RD GENERATION [06370 CPT(R)] Follow-up Instructions Return in about 1 month (around 8/25/2018). Referral Information Referral ID Referred By Referred To  
  
 [id number] Jocy CALDERON MD   
   [address] [place] [address] Phone: [phone] Fax: [phone] Visits Status Start Date End Date 1 New Request 7/25/18 7/25/19 If your referral has a status of pending review or denied, additional information will be sent to support the outcome of this decision. Patient Instructions Learning About Ventricular Tachycardia What is ventricular tachycardia? Ventricular tachycardia (say \"karlie-TRICK-yuh-ler htnb-ex-NPO-maureen-uh\"), or VT, is a type of fast heart rhythm. It starts in the lower part of the heart (ventricles). Some forms of VT may get worse and lead to ventricular fibrillation. Both conditions can be life-threatening. What causes it? VT may be caused by a heart problem that affects the structure of the heart or the heart muscle. These problems may include a heart attack, a heart defect that you were born with, or inflammation in the heart. Sometimes VT happens after heart surgery. Other heart rhythm problems can also lead to it. Some people with normal hearts have VT. This tends to be less serious. Some medicines, such as those used to treat some types of abnormal heart rhythms, can cause VT. Other causes include electrolyte imbalances and using illegal drugs such as stimulants like cocaine. In some cases, the cause isn't known. What are the symptoms? Symptoms of VT include: 
· Palpitations. This is an uncomfortable awareness of the heart beating very fast or not in a regular way. · Feeling dizzy or lightheaded. · Shortness of breath. · Chest pain or pressure. · Near-fainting or fainting. Symptoms happen because the heart beats too fast. It can't pump enough blood to the rest of the body. How is VT diagnosed? Your doctor will do an exam and ask about your health history. Your doctor will also do an electrocardiogram (EKG, ECG). This is a tracing of the electrical activity of your heart. VT can come and go. It may be hard to capture with an EKG at your doctor's office. So the doctor may want you to wear a heart monitor. It records your heart rhythm over a few days. You may have lab tests and a chest X-ray. Your doctor may also recommend other tests. · An echocardiogram looks at the structure of your heart. · A stress test can show if the heart muscle is getting enough blood or if there are any blockages in the arteries of the heart. · An electrophysiology (EP) study can find specific areas of your heart that may be causing the VT. The results of these tests can help your doctor decide what treatment options you have. How is it treated? Goals of treatment are to: · Prevent an abnormal heartbeat. · Improve your symptoms. · Prevent cardiac arrest (the heart stops beating) and sudden death. To prevent VT and relieve symptoms, you may take heart rhythm medicines. Some people may have a catheter ablation. This procedure destroys small areas of heart tissue that cause the irregular heartbeat. It may make VT happen less often. Or it may stop VT from happening again. Your doctor may recommend a device that can detect a life-threatening abnormal heartbeat and help restore a normal rhythm. This device might be implanted (ICD, or implantable cardioverter-defibrillator) or worn as a vest. 
If you have VT that is not stopping, it is a medical emergency. You may need a shock to try to get your heart back into a normal rhythm. This can be from an automated external defibrillator (AED), by paramedics, or through treatment in an emergency room. A doctor may give you medicines if your condition is stable. Follow-up care is a key part of your treatment and safety. Be sure to make and go to all appointments, and call your doctor if you are having problems. It's also a good idea to know your test results and keep a list of the medicines you take. Where can you learn more? Go to http://olivia-rohan.info/. Enter V110 in the search box to learn more about \"Learning About Ventricular Tachycardia. \" Current as of: March 8, 2018 Content Version: 11.7 © 8839-5413 [employer]. Care instructions adapted under license by [employer] (which disclaims liability or warranty for this information). If you have questions about a medical condition or this instruction, always ask your healthcare professional. [place] [address] any warranty or liability for your use of this information. Introducing [facility] [place] [facility]! [facility] introduces [employer] patient portal. Now you can access parts of your medical record, email your doctor's office, and request medication refills online. 1. In your internet browser, go to https://"[employer]". ezCater/"[employer]" 2. Click on the First Time User? Click Here link in the Sign In box.  You will see the New Member Sign Up page. 3. Enter your [employer] Access Code exactly as it appears below. You will not need to use this code after youve completed the sign-up process. If you do not sign up before the expiration date, you must request a new code. · [employer] Access Code: [email or web address] Expires: 8/21/2018  4:07 PM 
 
4. Enter the last four digits of your Social Security Number (xxxx) and Date of Birth (mm/dd/yyyy) as indicated and click Submit. You will be taken to the next sign-up page. 5. Create a [employer] ID. This will be your [employer] login ID and cannot be changed, so think of one that is secure and easy to remember. 6. Create a [employer] password. You can change your password at any time. 7. Enter your Password Reset Question and Answer. This can be used at a later time if you forget your password. 8. Enter your e-mail address. You will receive e-mail notification when new information is available in [address] 9. Click Sign Up. You can now view and download portions of your medical record. 10. Click the Download Summary menu link to download a portable copy of your medical information. If you have questions, please visit the Frequently Asked Questions section of the [employer] website. Remember, [employer] is NOT to be used for urgent needs. For medical emergencies, dial 911. Now available from your iPhone and Android! Please provide this summary of care documentation to your next provider. Your primary care clinician is listed as Malika Esposito. If you have any questions after today's visit, please call [phone].

## 2018-07-25 NOTE — PROGRESS NOTES
Chief Complaint   Patient presents with    Blood Pressure Check     Pluse Rate High, x1 weeks     she is a 27y.o. year old female who presents for evalution. Caregivers just started monitoring pt's BP and HR at home - has been elevated in past week. Pulse in 110-120s. BP typically 120s-140/80-90s. No chest pain, SOB, dizziness, vision changes. Reviewed PmHx, RxHx, FmHx, SocHx, AllgHx and updated and dated in the chart. Review of Systems - negative except as listed above in the HPI    Objective:     Vitals:    07/25/18 1401   BP: 122/86   Pulse: (!) 129   Resp: 16   Temp: 98.2 °F (36.8 °C)   TempSrc: Oral   SpO2: 99%   Weight: 188 lb (85.3 kg)   Height: 5' 4\" (1.626 m)     Physical Examination: General appearance - alert, well appearing, and in no distress  Chest - clear to auscultation, no wheezes, rales or rhonchi, symmetric air entry  Heart - normal rate, regular rhythm, normal S1, S2, no murmurs, rubs, clicks or gallops    Assessment/ Plan:   Diagnoses and all orders for this visit:    1. Tachycardia  -     metoprolol succinate (TOPROL-XL) 25 mg XL tablet; Take 1 Tab by mouth daily.  -     CBC WITH AUTOMATED DIFF  -     TSH 3RD GENERATION  -     METABOLIC PANEL, COMPREHENSIVE  -     [facility] [place] [facility]  New rx. Cont to monitor. If labs normal, F/U with Cardio for further assessment. 2. Essential hypertension  -     metoprolol succinate (TOPROL-XL) 25 mg XL tablet; Take 1 Tab by mouth daily. New dx and new rx. Encouraged pt to monitor BP at home, preferably in AM when first wakes up. Goal BP is < 130/90 if on meds. Discussed consequences of uncontrolled HTN and need for yearly eye exam. Recommended pt limit salt intake and engage in regular exercise. Continue current medications. F/U 1mo or sooner if needed    Pt voiced understanding regarding plan of care. Follow-up Disposition:  Return in about 1 month (around 8/25/2018) for HTN.     I have discussed the diagnosis with the patient and the intended plan as seen in the above orders. The patient has received an after-visit summary and questions were answered concerning future plans.      Medication Side Effects and Warnings were discussed with patient    Vandana Zhang NP

## 2018-07-25 NOTE — PATIENT INSTRUCTIONS
Learning About Ventricular Tachycardia  What is ventricular tachycardia? Ventricular tachycardia (say \"karlie-TRICK-yuh-ler bmkh-fm-ILZ-maureen-uh\"), or VT, is a type of fast heart rhythm. It starts in the lower part of the heart (ventricles). Some forms of VT may get worse and lead to ventricular fibrillation. Both conditions can be life-threatening. What causes it? VT may be caused by a heart problem that affects the structure of the heart or the heart muscle. These problems may include a heart attack, a heart defect that you were born with, or inflammation in the heart. Sometimes VT happens after heart surgery. Other heart rhythm problems can also lead to it. Some people with normal hearts have VT. This tends to be less serious. Some medicines, such as those used to treat some types of abnormal heart rhythms, can cause VT. Other causes include electrolyte imbalances and using illegal drugs such as stimulants like cocaine. In some cases, the cause isn't known. What are the symptoms? Symptoms of VT include:  · Palpitations. This is an uncomfortable awareness of the heart beating very fast or not in a regular way. · Feeling dizzy or lightheaded. · Shortness of breath. · Chest pain or pressure. · Near-fainting or fainting. Symptoms happen because the heart beats too fast. It can't pump enough blood to the rest of the body. How is VT diagnosed? Your doctor will do an exam and ask about your health history. Your doctor will also do an electrocardiogram (EKG, ECG). This is a tracing of the electrical activity of your heart. VT can come and go. It may be hard to capture with an EKG at your doctor's office. So the doctor may want you to wear a heart monitor. It records your heart rhythm over a few days. You may have lab tests and a chest X-ray. Your doctor may also recommend other tests. · An echocardiogram looks at the structure of your heart.   · A stress test can show if the heart muscle is getting enough blood or if there are any blockages in the arteries of the heart. · An electrophysiology (EP) study can find specific areas of your heart that may be causing the VT. The results of these tests can help your doctor decide what treatment options you have. How is it treated? Goals of treatment are to:  · Prevent an abnormal heartbeat. · Improve your symptoms. · Prevent cardiac arrest (the heart stops beating) and sudden death. To prevent VT and relieve symptoms, you may take heart rhythm medicines. Some people may have a catheter ablation. This procedure destroys small areas of heart tissue that cause the irregular heartbeat. It may make VT happen less often. Or it may stop VT from happening again. Your doctor may recommend a device that can detect a life-threatening abnormal heartbeat and help restore a normal rhythm. This device might be implanted (ICD, or implantable cardioverter-defibrillator) or worn as a vest.  If you have VT that is not stopping, it is a medical emergency. You may need a shock to try to get your heart back into a normal rhythm. This can be from an automated external defibrillator (AED), by paramedics, or through treatment in an emergency room. A doctor may give you medicines if your condition is stable. Follow-up care is a key part of your treatment and safety. Be sure to make and go to all appointments, and call your doctor if you are having problems. It's also a good idea to know your test results and keep a list of the medicines you take. Where can you learn more? Go to http://olivia-rohan.info/. Enter V110 in the search box to learn more about \"Learning About Ventricular Tachycardia. \"  Current as of: March 8, 2018  Content Version: 11.7  © 4273-2519 [employer]. Care instructions adapted under license by [employer] (which disclaims liability or warranty for this information).  If you have questions about a medical condition or this instruction, always ask your healthcare professional. [place] [address] any warranty or liability for your use of this information.

## 2018-07-26 LAB
ALBUMIN SERPL-MCNC: 4.1 G/DL (ref 3.5–5.5)
ALBUMIN/GLOB SERPL: 1.4 {RATIO} (ref 1.2–2.2)
ALP SERPL-CCNC: 67 IU/L (ref 39–117)
ALT SERPL-CCNC: 12 IU/L (ref 0–32)
AST SERPL-CCNC: 22 IU/L (ref 0–40)
BASOPHILS # BLD AUTO: NORMAL 10*3/UL
BILIRUB SERPL-MCNC: <0.2 MG/DL (ref 0–1.2)
BUN SERPL-MCNC: 11 MG/DL (ref 6–20)
BUN/CREAT SERPL: 14 (ref 9–23)
CALCIUM SERPL-MCNC: 9.4 MG/DL (ref 8.7–10.2)
CHLORIDE SERPL-SCNC: 98 MMOL/L (ref 96–106)
CO2 SERPL-SCNC: 14 MMOL/L (ref 20–29)
CREAT SERPL-MCNC: 0.76 MG/DL (ref 0.57–1)
EOSINOPHIL # BLD AUTO: NORMAL 10*3/UL
EOSINOPHIL NFR BLD AUTO: NORMAL %
GLOBULIN SER CALC-MCNC: 3 G/DL (ref 1.5–4.5)
GLUCOSE SERPL-MCNC: 149 MG/DL (ref 65–99)
HCT VFR BLD AUTO: NORMAL %
HGB BLD-MCNC: NORMAL G/DL
LYMPHOCYTES # BLD AUTO: NORMAL 10*3/UL
LYMPHOCYTES NFR BLD AUTO: NORMAL %
MONOCYTES NFR BLD AUTO: NORMAL %
NEUTROPHILS NFR BLD AUTO: NORMAL %
PLATELET # BLD AUTO: NORMAL 10*3/UL
POTASSIUM SERPL-SCNC: 4.6 MMOL/L (ref 3.5–5.2)
PROT SERPL-MCNC: 7.1 G/DL (ref 6–8.5)
RBC # BLD AUTO: NORMAL 10*6/UL
SODIUM SERPL-SCNC: 136 MMOL/L (ref 134–144)
TSH SERPL DL<=0.005 MIU/L-ACNC: 1.6 UIU/ML (ref 0.45–4.5)
WBC # BLD AUTO: NORMAL X10E3/UL

## 2018-07-26 NOTE — PROGRESS NOTES
Please inform caregivers pt's labs normal.  CBC unable to be run but we can also try again at F/U or if pt feels poorly in future.    Thanks,  N

## 2018-07-30 ENCOUNTER — TELEPHONE (OUTPATIENT)
Dept: FAMILY MEDICINE CLINIC | Age: 33
End: 2018-07-30

## 2018-07-30 NOTE — TELEPHONE ENCOUNTER
----- Message from Nahomi Nails sent at 7/30/2018  9:43 AM EDT -----  Regarding: NP [place]de/telephone  Pt is calling to get Test Result, from July 25, please call [phone].

## 2018-08-15 ENCOUNTER — CLINICAL SUPPORT (OUTPATIENT)
Dept: FAMILY MEDICINE CLINIC | Age: 33
End: 2018-08-15

## 2018-08-15 DIAGNOSIS — N94.6 DYSMENORRHEA: Primary | ICD-10-CM

## 2018-08-15 NOTE — PROGRESS NOTES
1. Have you been to the ER, urgent care clinic since your last visit? Hospitalized since your last visit? No    2. Have you seen or consulted any other health care providers outside of the [address] [place] [address] since your last visit? Include any pap smears or colon screening.  No   Chief Complaint   Patient presents with    Injection     depo

## 2018-08-17 ENCOUNTER — DOCUMENTATION ONLY (OUTPATIENT)
Dept: FAMILY MEDICINE CLINIC | Age: 33
End: 2018-08-17

## 2018-08-17 NOTE — PROGRESS NOTES
Faxed 07/25/18 office note/lab results to Dr Liza Bourne per S request. Fax #[phone] was received.

## 2018-08-24 ENCOUNTER — OFFICE VISIT (OUTPATIENT)
Dept: FAMILY MEDICINE CLINIC | Age: 33
End: 2018-08-24

## 2018-08-24 VITALS
RESPIRATION RATE: 16 BRPM | OXYGEN SATURATION: 99 % | DIASTOLIC BLOOD PRESSURE: 89 MMHG | HEIGHT: 64 IN | BODY MASS INDEX: 32.44 KG/M2 | TEMPERATURE: 98.9 F | SYSTOLIC BLOOD PRESSURE: 138 MMHG | WEIGHT: 190 LBS | HEART RATE: 118 BPM

## 2018-08-24 DIAGNOSIS — Z00.00 ROUTINE GENERAL MEDICAL EXAMINATION AT A HEALTH CARE FACILITY: Primary | ICD-10-CM

## 2018-08-24 DIAGNOSIS — E78.2 MIXED HYPERLIPIDEMIA: ICD-10-CM

## 2018-08-24 DIAGNOSIS — R00.0 TACHYCARDIA: ICD-10-CM

## 2018-08-24 NOTE — PROGRESS NOTES
1. Have you been to the ER, urgent care clinic since your last visit? Hospitalized since your last visit? No    2. Have you seen or consulted any other health care providers outside of the [facility] since your last visit? Include any pap smears or colon screening.  No  Chief Complaint   Patient presents with    Follow-up     Pulse Rate

## 2018-08-24 NOTE — MR AVS SNAPSHOT
[address] [id number] 
[phone] Patient: Shyam Hewitt MRN: [id number] :[id number] Visit Information Date & Time Provider Department Dept. Phone Encounter #  
 2018  3:15 PM Gabino Lord NP [address] [facility] [phone] [id number] Follow-up Instructions Return if symptoms worsen or fail to improve. Your Appointments 2018  2:00 PM  
ESTABLISHED PATIENT with Gabino Lord NP [address] [facility] ([facility]) Appt Note: [address], PPD  
 [address] [facility] [id number] [place] [address] Road [id number] [phone]  
  
   
 N [address] [place] [address] Road [address]  
  
    
 10/10/2018  1:45 PM  
ROUTINE CARE with Derrick Adair MD  
[facility]) Appt Note: F/U routine 4 months 100 [address] [place] [address] [address] [facility] [address] [phone]  
  
   
 [address] [place] [id number] Upcoming Health Maintenance Date Due  
 EYE EXAM RETINAL OR DILATED Q1 10/24/1993 Pneumococcal 19-64 Medium Risk (1 of 1 - PPSV23) 10/24/2002 DTaP/Tdap/Td series (1 - Tdap) 10/24/2004 PAP AKA CERVICAL CYTOLOGY 10/24/2004 MEDICARE YEARLY EXAM 3/14/2018 Influenza Age 5 to Adult 2018 HEMOGLOBIN A1C Q6M 2018 MICROALBUMIN Q1 2018 LIPID PANEL Q1 2018 FOOT EXAM Q1 2019 Allergies as of 2018  Review Complete On: 2018 By: Neno Christianson LPN Severity Noted Reaction Type Reactions Other Plant, Animal, Environmental  2015    Sneezing Current Immunizations  Never Reviewed No immunizations on file. Not reviewed this visit You Were Diagnosed With   
  
 Codes Comments Routine general medical examination at a health care facility    -  Primary ICD-10-CM: Z00.00 ICD-9-CM: V70.0 Tachycardia     ICD-10-CM: R00.0 ICD-9-CM: 785.0 Vitals BP Pulse Temp Resp Height(growth percentile) Weight(growth percentile) 138/89 (!) 118 98.9 °F (37.2 °C) (Oral) 16 5' 4\" (1.626 m) 190 lb (86.2 kg) SpO2 BMI OB Status Smoking Status 99% 32.61 kg/m2 Injection Never Smoker BMI and BSA Data Body Mass Index Body Surface Area  
 32.61 kg/m 2 1.97 m 2 Preferred Pharmacy Pharmacy Name Phone [facility] [address] [phone] Your Updated Medication List  
  
   
This list is accurate as of 8/24/18  3:40 PM.  Always use your most recent med list.  
  
  
  
  
 AMITIZA 24 mcg capsule Generic drug:  lubiPROStone TAKE 1 CAPSULE BY MOUTH TWICE DAILY WITH MEALS  
  
 ammonium lactate 12 % lotion Commonly known as:  LAC-HYDRIN Apply  to affected area two (2) times a day. rub in to affected area well  
  
 benzoyl peroxide 5 % external liquid AAA every day prn  
  
 busPIRone 10 mg tablet Commonly known as:  BUSPAR Take 15 mg by mouth two (2) times a day. cetirizine 10 mg tablet Commonly known as:  ZYRTEC  
TAKE 1 TABLET BY MOUTH DAILY FOR ALLERGIES  
  
 clomiPRAMINE 75 mg capsule Commonly known as:  ANAFRANIL Take 225 mg by mouth nightly. clonazePAM 2 mg tablet Commonly known as:  Elieser Ana Take 2 mg by mouth three (3) times daily. cromolyn 4 % ophthalmic solution Commonly known as:  OPTICROM Administer 1 Drop to both eyes four (4) times daily as needed. For itchy, watery eyes DEPO-PROVERA 150 mg/mL injection Generic drug:  medroxyPROGESTERone 1 mL by IntraMUSCular route every three (3) months. divalproex  mg tablet Commonly known as:  DEPAKOTE Take 1,000 mg by mouth daily. fluconazole 150 mg tablet Commonly known as:  DIFLUCAN  
TAKE 1 TABLET BY MOUTH ONCE A MONTH ON 15TH FLUoxetine 20 mg capsule Commonly known as:  PROzac Take 20 mg by mouth daily. fluticasone 50 mcg/actuation nasal spray Commonly known as:  Aiden Nunez 2 Sprays by Both Nostrils route daily. insulin glargine 100 unit/mL (3 mL) Inpn Commonly known as:  LANTUS SOLOSTAR U-100 INSULIN Inject 24 units at bed time  
  
 insulin lispro 100 unit/mL kwikpen Commonly known as:  HUMALOG  
8 units before breakfast , 8 units before  lunch and 4 units before dinner No humalog or Novolog at [id number] mg Add 2 units 201-250 mg Add 4  units 251-300 mg Add 6 units 301-350 mg Add 8 units 351-400mg Add 10 units If sugars are more than 450 then take her to hospital  
  
 Insulin Needles (Disposable) 29 gauge x 1/2\" Ndle Commonly known as:  BD ULTRA-FINE ORIG PEN NEEDLE Use to inject insulin 4 times daily Dx Code: E11.65 LORazepam 1 mg tablet Commonly known as:  ATIVAN Take 1 mg by mouth as needed for Anxiety. metFORMIN 1,000 mg tablet Commonly known as:  GLUCOPHAGE  
TAKE 1 TABLET BY MOUTH IN THE MORNING AND IN THE EVENING WITH FOOD  
  
 metoprolol succinate 25 mg XL tablet Commonly known as:  TOPROL-XL Take 1 Tab by mouth daily. minocycline 50 mg capsule Commonly known as:  Coelho Efe Take 50 mg by mouth daily. naltrexone 50 mg tablet Commonly known as:  DEPADE Take 50 mg by mouth daily. nitrofurantoin (macrocrystal-monohydrate) 100 mg capsule Commonly known as:  MACROBID Take 1 Cap by mouth two (2) times a day. polyethylene glycol 17 gram packet Commonly known as:  Geneva Lamprey Take 17 g by mouth as needed. risperiDONE 3 mg tablet Commonly known as:  RisperDAL Take 8 mg by mouth two (2) times a day. SEROquel  mg sr tablet Generic drug:  QUEtiapine SR Take 400 mg by mouth nightly. simvastatin 10 mg tablet Commonly known as:  ZOCOR  
TAKE 1 TABLET BY MOUTH AT BEDTIME FOR CHOLESTEROL * VITAMIN D3 1,000 unit Cap Generic drug:  cholecalciferol Take 1 Cap by mouth daily. * cholecalciferol 1,000 unit tablet Commonly known as:  VITAMIN D3  
TAKE 1 TABLET BY MOUTH DAILY * Notice: This list has 2 medication(s) that are the same as other medications prescribed for you. Read the directions carefully, and ask your doctor or other care provider to review them with you. Follow-up Instructions Return if symptoms worsen or fail to improve. Patient Instructions Learning About Ventricular Tachycardia What is ventricular tachycardia? Ventricular tachycardia (say \"karlie-TRICK-yuh-ler ieps-lb-ZZD-maureen-uh\"), or VT, is a type of fast heart rhythm. It starts in the lower part of the heart (ventricles). Some forms of VT may get worse and lead to ventricular fibrillation. Both conditions can be life-threatening. What causes it? VT may be caused by a heart problem that affects the structure of the heart or the heart muscle. These problems may include a heart attack, a heart defect that you were born with, or inflammation in the heart. Sometimes VT happens after heart surgery. Other heart rhythm problems can also lead to it. Some people with normal hearts have VT. This tends to be less serious. Some medicines, such as those used to treat some types of abnormal heart rhythms, can cause VT. Other causes include electrolyte imbalances and using illegal drugs such as stimulants like cocaine. In some cases, the cause isn't known. What are the symptoms? Symptoms of VT include: 
· Palpitations. This is an uncomfortable awareness of the heart beating very fast or not in a regular way. · Feeling dizzy or lightheaded. · Shortness of breath. · Chest pain or pressure. · Near-fainting or fainting. Symptoms happen because the heart beats too fast. It can't pump enough blood to the rest of the body. How is VT diagnosed? Your doctor will do an exam and ask about your health history. Your doctor will also do an electrocardiogram (EKG, ECG).  This is a tracing of the electrical activity of your heart. VT can come and go. It may be hard to capture with an EKG at your doctor's office. So the doctor may want you to wear a heart monitor. It records your heart rhythm over a few days. You may have lab tests and a chest X-ray. Your doctor may also recommend other tests. · An echocardiogram looks at the structure of your heart. · A stress test can show if the heart muscle is getting enough blood or if there are any blockages in the arteries of the heart. · An electrophysiology (EP) study can find specific areas of your heart that may be causing the VT. The results of these tests can help your doctor decide what treatment options you have. How is it treated? Goals of treatment are to: · Prevent an abnormal heartbeat. · Improve your symptoms. · Prevent cardiac arrest (the heart stops beating) and sudden death. To prevent VT and relieve symptoms, you may take heart rhythm medicines. Some people may have a catheter ablation. This procedure destroys small areas of heart tissue that cause the irregular heartbeat. It may make VT happen less often. Or it may stop VT from happening again. Your doctor may recommend a device that can detect a life-threatening abnormal heartbeat and help restore a normal rhythm. This device might be implanted (ICD, or implantable cardioverter-defibrillator) or worn as a vest. 
If you have VT that is not stopping, it is a medical emergency. You may need a shock to try to get your heart back into a normal rhythm. This can be from an automated external defibrillator (AED), by paramedics, or through treatment in an emergency room. A doctor may give you medicines if your condition is stable. Follow-up care is a key part of your treatment and safety. Be sure to make and go to all appointments, and call your doctor if you are having problems. It's also a good idea to know your test results and keep a list of the medicines you take. Where can you learn more? Go to http://olivia-rohan.info/. Enter V110 in the search box to learn more about \"Learning About Ventricular Tachycardia. \" Current as of: March 8, 2018 Content Version: 11.7 © 5127-9849 [employer]. Care instructions adapted under license by [employer] (which disclaims liability or warranty for this information). If you have questions about a medical condition or this instruction, always ask your healthcare professional. [address] any warranty or liability for your use of this information. Introducing [facility]! [employer] introduces Mediastay patient portal. Now you can access parts of your medical record, email your doctor's office, and request medication refills online. 1. In your internet browser, go to [email or web address] 2. Click on the First Time User? Click Here link in the Sign In box. You will see the New Member Sign Up page. 3. Enter your Mediastay Access Code exactly as it appears below. You will not need to use this code after youve completed the sign-up process. If you do not sign up before the expiration date, you must request a new code. · Mediastay Access Code: [email or web address] Expires: 11/22/2018  3:40 PM 
 
4. Enter the last four digits of your Social Security Number (xxxx) and Date of Birth (mm/dd/yyyy) as indicated and click Submit. You will be taken to the next sign-up page. 5. Create a Phanfaret ID. This will be your Mediastay login ID and cannot be changed, so think of one that is secure and easy to remember. 6. Create a Mediastay password. You can change your password at any time. 7. Enter your Password Reset Question and Answer. This can be used at a later time if you forget your password. 8. Enter your e-mail address. You will receive e-mail notification when new information is available in [address] 9. Click Sign Up. You can now view and download portions of your medical record. 10. Click the Download Summary menu link to download a portable copy of your medical information. If you have questions, please visit the Frequently Asked Questions section of the [employer] website. Remember, Telinet is NOT to be used for urgent needs. For medical emergencies, dial 911. Now available from your iPhone and Android! Please provide this summary of care documentation to your next provider. Your primary care clinician is listed as Gilbert Torres. If you have any questions after today's visit, please call [phone].

## 2018-08-24 NOTE — PROGRESS NOTES
This is the Subsequent Medicare Annual Wellness Exam, performed 12 months or more after the Initial AWV or the last Subsequent AWV    I have reviewed the patient's medical history in detail and updated the computerized patient record. History     Past Medical History:   Diagnosis Date    Autism     Diabetes ([address]     Intermittent explosive disorder       History reviewed. No pertinent surgical history. Current Outpatient Prescriptions   Medication Sig Dispense Refill    benzoyl peroxide 5 % external liquid AAA every day prn 1 Bottle 5    metoprolol succinate (TOPROL-XL) 25 mg XL tablet Take 1 Tab by mouth daily. 30 Tab 1    cholecalciferol (VITAMIN D3) 1,000 unit tablet TAKE 1 TABLET BY MOUTH DAILY 31 Tab PRN    AMITIZA 24 mcg capsule TAKE 1 CAPSULE BY MOUTH TWICE DAILY WITH MEALS 60 Cap PRN    fluconazole (DIFLUCAN) 150 mg tablet TAKE 1 TABLET BY MOUTH ONCE A MONTH ON 15TH 1 Tab PRN    simvastatin (ZOCOR) 10 mg tablet TAKE 1 TABLET BY MOUTH AT BEDTIME FOR CHOLESTEROL 30 Tab PRN    cetirizine (ZYRTEC) 10 mg tablet TAKE 1 TABLET BY MOUTH DAILY FOR ALLERGIES 30 Tab PRN    fluticasone (FLONASE) 50 mcg/actuation nasal spray 2 Sprays by Both Nostrils route daily. 1 Bottle 3    cromolyn (OPTICROM) 4 % ophthalmic solution Administer 1 Drop to both eyes four (4) times daily as needed. For itchy, watery eyes 10 mL 1    nitrofurantoin, macrocrystal-monohydrate, (MACROBID) 100 mg capsule Take 1 Cap by mouth two (2) times a day.  10 Cap 0    insulin lispro (HUMALOG) 100 unit/mL kwikpen 8 units before breakfast , 8 units before  lunch and 4 units before dinner No humalog or Novolog at [id number] mg Add 2 units 201-250 mg  Add 4  units 251-300 mg Add 6 units 301-350 mg Add 8 units 351-400mg Add 10 units If sugars are more than 450 then take her to hospital 15 mL 11    metFORMIN (GLUCOPHAGE) 1,000 mg tablet TAKE 1 TABLET BY MOUTH IN THE MORNING AND IN THE EVENING WITH FOOD 60 Tab 11    Insulin Needles, Disposable, (BD INSULIN PEN NEEDLE UF ORIG) 29 gauge x 1/2\" ndle Use to inject insulin 4 times daily Dx Code: E11.65 200 Pen Needle 11    insulin glargine (LANTUS SOLOSTAR) 100 unit/mL (3 mL) inpn Inject 24 units at bed time 15 mL 11    LORazepam (ATIVAN) 1 mg tablet Take 1 mg by mouth as needed for Anxiety.  busPIRone (BUSPAR) 10 mg tablet Take 15 mg by mouth two (2) times a day.  polyethylene glycol (MIRALAX) 17 gram packet Take 17 g by mouth as needed.  ammonium lactate (LAC-HYDRIN) 12 % lotion Apply  to affected area two (2) times a day. rub in to affected area well      QUEtiapine SR (SEROQUEL XR) 300 mg sr tablet Take 400 mg by mouth nightly.  clomiPRAMINE (ANAFRANIL) 75 mg capsule Take 225 mg by mouth nightly.  divalproex DR (DEPAKOTE) 500 mg tablet Take 1,000 mg by mouth daily.  risperiDONE (RISPERDAL) 3 mg tablet Take 8 mg by mouth two (2) times a day.  medroxyPROGESTERone (DEPO-PROVERA) 150 mg/mL injection 1 mL by IntraMUSCular route every three (3) months.  Cholecalciferol, Vitamin D3, (VITAMIN D3) 1,000 unit cap Take 1 Cap by mouth daily.  minocycline (MINOCIN, DYNACIN) 50 mg capsule Take 50 mg by mouth daily.  clonazePAM (KLONOPIN) 2 mg tablet Take 2 mg by mouth three (3) times daily.  naltrexone (DEPADE) 50 mg tablet Take 50 mg by mouth daily.  FLUoxetine (PROZAC) 20 mg capsule Take 20 mg by mouth daily.        Allergies   Allergen Reactions    Other Plant, Animal, Environmental Sneezing     Family History   Problem Relation Age of Onset    Diabetes Mother     Diabetes Father     Heart Disease Father     Cancer Maternal Grandfather      Social History   Substance Use Topics    Smoking status: Never Smoker    Smokeless tobacco: Never Used    Alcohol use No     Patient Active Problem List   Diagnosis Code    Obesity E66.9    Mixed hyperlipidemia E78.2    BMI 34.0-34.9,adult Z68.34    Non morbid obesity E66.9    Active autistic disorder F84.0    Hypovitaminosis D E55.9    Uncontrolled type 2 diabetes mellitus with diabetic neuropathy, with long-term current use of insulin ([facility]) E11.40, Z79.4, E11.65     Depression Risk Factor Screening:     PHQ over the last two weeks 8/24/2018   Little interest or pleasure in doing things Not at all   Feeling down, depressed, irritable, or hopeless Not at all   Total Score PHQ 2 0     Alcohol Risk Factor Screening: You do not drink alcohol or very rarely. Functional Ability and Level of Safety:   Hearing Loss  Hearing is good. Activities of Daily Living  The home contains: handrails and grab bars  Patient needs help with:  phone, transportation, shopping, preparing meals, laundry, housework, managing medications, managing money, eating, dressing, bathing, hygiene and bathroom needs    Fall Risk  No flowsheet data found. Abuse Screen  Patient is not abused    Cognitive Screening   Evaluation of Cognitive Function:  Has your family/caregiver stated any concerns about your memory: no  Abnormal but unchanged from prior    Here for tachycardia F/U. Saw Cardio yesterday, has been monitoring HR at night time while asleep. He stated no changes are needed, may continue to monitor but does not recommend additional medication. HR ranging from 100-120s. Pt will need PPD but too uncooperative today, caregivers states will come back another time.       Physical Examination: General appearance - alert, well appearing, and in no distress, uncooperative   Chest - clear to auscultation, no wheezes, rales or rhonchi, symmetric air entry  Heart - normal rate, regular rhythm, normal S1, S2, no murmurs, rubs, clicks or gallops  Neurological - abnormal neurological exam unchanged from prior examinations    Patient Care Team   Patient Care Team:  Mihaela Lee NP as PCP - General (Nurse Practitioner)  Lorena Fernandez MD (Endocrinology)    Assessment/Plan   Education and counseling provided:  End-of-Life planning (with patient's consent)  Screening Pap and pelvic (covered once every 2 years) - pt declines  Cardiovascular screening blood test    Diagnoses and all orders for this visit:    1. Routine general medical examination at a health care facility    2. Tachycardia  Stable, cont to monitor. 3. Uncontrolled type 2 diabetes mellitus with diabetic neuropathy, with long-term current use of insulin (HCC)  Sees Endo for management    4. Mixed hyperlipidemia  Not fasting for labs today. Will come back later when able to have drawn. Taking prn before appointments. Health Maintenance Due   Topic Date Due    Pneumococcal 19-64 Medium Risk (1 of 1 - PPSV23) 10/24/2002    DTaP/Tdap/Td series (1 - Tdap) 10/24/2004    PAP AKA CERVICAL CYTOLOGY  10/24/2004    MEDICARE YEARLY EXAM  03/14/2018    Influenza Age 9 to Adult  08/01/2018    HEMOGLOBIN A1C Q6M  09/22/2018       Discussed the patient's BMI with her. The BMI follow up plan is as follows:     dietary management education, guidance, and counseling  encourage exercise  monitor weight  prescribed dietary intake    An After Visit Summary was printed and given to the patient.

## 2018-08-24 NOTE — PATIENT INSTRUCTIONS
Learning About Ventricular Tachycardia  What is ventricular tachycardia? Ventricular tachycardia (say \"karlie-TRICK-yuh-ler dynt-vi-XJO-maureen-uh\"), or VT, is a type of fast heart rhythm. It starts in the lower part of the heart (ventricles). Some forms of VT may get worse and lead to ventricular fibrillation. Both conditions can be life-threatening. What causes it? VT may be caused by a heart problem that affects the structure of the heart or the heart muscle. These problems may include a heart attack, a heart defect that you were born with, or inflammation in the heart. Sometimes VT happens after heart surgery. Other heart rhythm problems can also lead to it. Some people with normal hearts have VT. This tends to be less serious. Some medicines, such as those used to treat some types of abnormal heart rhythms, can cause VT. Other causes include electrolyte imbalances and using illegal drugs such as stimulants like cocaine. In some cases, the cause isn't known. What are the symptoms? Symptoms of VT include:  · Palpitations. This is an uncomfortable awareness of the heart beating very fast or not in a regular way. · Feeling dizzy or lightheaded. · Shortness of breath. · Chest pain or pressure. · Near-fainting or fainting. Symptoms happen because the heart beats too fast. It can't pump enough blood to the rest of the body. How is VT diagnosed? Your doctor will do an exam and ask about your health history. Your doctor will also do an electrocardiogram (EKG, ECG). This is a tracing of the electrical activity of your heart. VT can come and go. It may be hard to capture with an EKG at your doctor's office. So the doctor may want you to wear a heart monitor. It records your heart rhythm over a few days. You may have lab tests and a chest X-ray. Your doctor may also recommend other tests. · An echocardiogram looks at the structure of your heart.   · A stress test can show if the heart muscle is getting enough blood or if there are any blockages in the arteries of the heart. · An electrophysiology (EP) study can find specific areas of your heart that may be causing the VT. The results of these tests can help your doctor decide what treatment options you have. How is it treated? Goals of treatment are to:  · Prevent an abnormal heartbeat. · Improve your symptoms. · Prevent cardiac arrest (the heart stops beating) and sudden death. To prevent VT and relieve symptoms, you may take heart rhythm medicines. Some people may have a catheter ablation. This procedure destroys small areas of heart tissue that cause the irregular heartbeat. It may make VT happen less often. Or it may stop VT from happening again. Your doctor may recommend a device that can detect a life-threatening abnormal heartbeat and help restore a normal rhythm. This device might be implanted (ICD, or implantable cardioverter-defibrillator) or worn as a vest.  If you have VT that is not stopping, it is a medical emergency. You may need a shock to try to get your heart back into a normal rhythm. This can be from an automated external defibrillator (AED), by paramedics, or through treatment in an emergency room. A doctor may give you medicines if your condition is stable. Follow-up care is a key part of your treatment and safety. Be sure to make and go to all appointments, and call your doctor if you are having problems. It's also a good idea to know your test results and keep a list of the medicines you take. Where can you learn more? Go to http://olivia-rohan.info/. Enter V110 in the search box to learn more about \"Learning About Ventricular Tachycardia. \"  Current as of: March 8, 2018  Content Version: 11.7  © 8395-6202 [employer]. Care instructions adapted under license by [employer] (which disclaims liability or warranty for this information).  If you have questions about a medical condition or this instruction, always ask your healthcare professional. [address] any warranty or liability for your use of this information. Body Mass Index: Care Instructions  Your Care Instructions    Body mass index (BMI) can help you see if your weight is raising your risk for health problems. It uses a formula to compare how much you weigh with how tall you are. · A BMI lower than 18.5 is considered underweight. · A BMI between 18.5 and 24.9 is considered healthy. · A BMI between 25 and 29.9 is considered overweight. A BMI of 30 or higher is considered obese. If your BMI is in the normal range, it means that you have a lower risk for weight-related health problems. If your BMI is in the overweight or obese range, you may be at increased risk for weight-related health problems, such as high blood pressure, heart disease, stroke, arthritis or joint pain, and diabetes. If your BMI is in the underweight range, you may be at increased risk for health problems such as fatigue, lower protection (immunity) against illness, muscle loss, bone loss, hair loss, and hormone problems. BMI is just one measure of your risk for weight-related health problems. You may be at higher risk for health problems if you are not active, you eat an unhealthy diet, or you drink too much alcohol or use tobacco products. Follow-up care is a key part of your treatment and safety. Be sure to make and go to all appointments, and call your doctor if you are having problems. It's also a good idea to know your test results and keep a list of the medicines you take. How can you care for yourself at home? · Practice healthy eating habits. This includes eating plenty of fruits, vegetables, whole grains, lean protein, and low-fat dairy. · If your doctor recommends it, get more exercise. Walking is a good choice. Bit by bit, increase the amount you walk every day.  Try for at least 30 minutes on most days of the week.  · Do not smoke. Smoking can increase your risk for health problems. If you need help quitting, talk to your doctor about stop-smoking programs and medicines. These can increase your chances of quitting for good. · Limit alcohol to 2 drinks a day for men and 1 drink a day for women. Too much alcohol can cause health problems. If you have a BMI higher than 25  · Your doctor may do other tests to check your risk for weight-related health problems. This may include measuring the distance around your waist. A waist measurement of more than 40 inches in men or 35 inches in women can increase the risk of weight-related health problems. · Talk with your doctor about steps you can take to stay healthy or improve your health. You may need to make lifestyle changes to lose weight and stay healthy, such as changing your diet and getting regular exercise. If you have a BMI lower than 18.5  · Your doctor may do other tests to check your risk for health problems. · Talk with your doctor about steps you can take to stay healthy or improve your health. You may need to make lifestyle changes to gain or maintain weight and stay healthy, such as getting more healthy foods in your diet and doing exercises to build muscle. Where can you learn more? Go to http://olivia-rohan.info/. Enter S176 in the search box to learn more about \"Body Mass Index: Care Instructions. \"  Current as of: October 13, 2016  Content Version: 11.4  © 2384-8218 [employer]. Care instructions adapted under license by [employer] (which disclaims liability or warranty for this information). If you have questions about a medical condition or this instruction, always ask your healthcare professional. [place] [address] any warranty or liability for your use of this information.

## 2018-09-17 ENCOUNTER — OFFICE VISIT (OUTPATIENT)
Dept: FAMILY MEDICINE CLINIC | Age: 33
End: 2018-09-17

## 2018-09-17 VITALS — HEIGHT: 64 IN | OXYGEN SATURATION: 99 % | WEIGHT: 184 LBS | BODY MASS INDEX: 31.41 KG/M2 | RESPIRATION RATE: 16 BRPM

## 2018-09-17 DIAGNOSIS — Z11.1 SCREENING FOR TUBERCULOSIS: Primary | ICD-10-CM

## 2018-09-17 NOTE — PROGRESS NOTES
1. Have you been to the ER, urgent care clinic since your last visit? Hospitalized since your last visit? No    2. Have you seen or consulted any other health care providers outside of the [facility] since your last visit? Include any pap smears or colon screening.  No   Chief Complaint   Patient presents with    Injection     PPD

## 2018-09-20 LAB
MM INDURATION POC: NORMAL MM (ref 0–5)
PPD POC: NORMAL NEGATIVE

## 2018-10-10 ENCOUNTER — OFFICE VISIT (OUTPATIENT)
Dept: ENDOCRINOLOGY | Age: 33
End: 2018-10-10

## 2018-10-10 VITALS
BODY MASS INDEX: 31.31 KG/M2 | DIASTOLIC BLOOD PRESSURE: 71 MMHG | HEART RATE: 108 BPM | WEIGHT: 183.4 LBS | SYSTOLIC BLOOD PRESSURE: 107 MMHG | TEMPERATURE: 96.2 F | HEIGHT: 64 IN | RESPIRATION RATE: 18 BRPM

## 2018-10-10 DIAGNOSIS — E11.65 UNCONTROLLED TYPE 2 DIABETES MELLITUS WITH HYPERGLYCEMIA, WITH LONG-TERM CURRENT USE OF INSULIN (HCC): ICD-10-CM

## 2018-10-10 DIAGNOSIS — E78.2 MIXED HYPERLIPIDEMIA: ICD-10-CM

## 2018-10-10 DIAGNOSIS — Z79.4 UNCONTROLLED TYPE 2 DIABETES MELLITUS WITH HYPERGLYCEMIA, WITH LONG-TERM CURRENT USE OF INSULIN (HCC): ICD-10-CM

## 2018-10-10 DIAGNOSIS — E66.9 NON MORBID OBESITY: ICD-10-CM

## 2018-10-10 LAB
GLUCOSE POC: 189 MG/DL
HBA1C MFR BLD HPLC: 7.7 %

## 2018-10-10 RX ORDER — INSULIN GLARGINE 100 [IU]/ML
INJECTION, SOLUTION SUBCUTANEOUS
Qty: 15 ML | Refills: 11 | Status: SHIPPED | OUTPATIENT
Start: 2018-10-10 | End: 2019-10-02 | Stop reason: SDUPTHER

## 2018-10-10 NOTE — PROGRESS NOTES
Yaz Sanders is a 27 y.o. female Chief Complaint Patient presents with  Diabetes 1. Have you been to the ER, urgent care clinic since your last visit? Hospitalized since your last visit? No 
M 
2. Have you seen or consulted any other health care providers outside of the [address] [place] [address] since your last visit? Include any pap smears or colon screening. No 
v 
 
Visit Vitals  /71 (BP 1 Location: Left arm, BP Patient Position: Sitting)  Pulse (!) 108  Temp 96.2 °F (35.7 °C)  Resp 18  Ht 5' 4\" (1.626 m)  Wt 183 lb 6.4 oz (83.2 kg)  PF 99 L/min  BMI 31.48 kg/m2 Health Maintenance Due Topic Date Due  Pneumococcal 19-64 Medium Risk (1 of 1 - PPSV23) 10/24/2002  DTaP/Tdap/Td series (1 - Tdap) 10/24/2004  PAP AKA CERVICAL CYTOLOGY  10/24/2004  HEMOGLOBIN A1C Q6M  09/22/2018 Wt Readings from Last 3 Encounters:  
10/10/18 183 lb 6.4 oz (83.2 kg) 09/17/18 184 lb (83.5 kg) 08/24/18 190 lb (86.2 kg) Temp Readings from Last 3 Encounters:  
10/10/18 96.2 °F (35.7 °C)  
08/24/18 98.9 °F (37.2 °C) (Oral)  
07/25/18 98.2 °F (36.8 °C) (Oral) BP Readings from Last 3 Encounters:  
10/10/18 107/71  
08/24/18 138/89  
07/25/18 122/86 Pulse Readings from Last 3 Encounters:  
10/10/18 (!) 108  
08/24/18 (!) 118  
07/25/18 (!) 129

## 2018-10-10 NOTE — PROGRESS NOTES
[facility] DIABETES AND ENDOCRINOLOGY Pat Gilliland MD 
 
  
 
 
 
Patient Information Date:10/10/2018 Name : Dania Osorio 27 y.o.    
YOB: 1985 Referred by:  Group home Chief Complaint Patient presents with  Diabetes History of Present Illness: Dania Osorio is a 27 y.o. female here for fu of  Type 2 Diabetes Mellitus. She is on basal bolus regimen No hypoglycemia, no hospitalization There is a pattern of hyperglycemia mostly on the weekends  which was discussed with the caretaker who was with the patient, suspect the weekend staff is giving her more carbs than she needs. Fasting hyperglycemia and there are certain days where there are unexplained hyperglycemia 
 the only way to calm her is to give the  food is what I have learned and I suspect some of the staff members are giving her snacks whenever she is agitated. She lives in a Group home due to underlying Psychiatric condition Here with care taker , does not communicate Monitoring frequency:3 /day Wt Readings from Last 3 Encounters:  
10/10/18 183 lb 6.4 oz (83.2 kg) 09/17/18 184 lb (83.5 kg) 08/24/18 190 lb (86.2 kg) BP Readings from Last 3 Encounters:  
10/10/18 107/71  
08/24/18 138/89  
07/25/18 122/86 Past Medical History:  
Diagnosis Date  Autism  Diabetes ([address]  Intermittent explosive disorder Current Outpatient Prescriptions Medication Sig  
 metoprolol succinate (TOPROL-XL) 25 mg XL tablet TAKE 1 TABLET BY MOUTH DAILY  benzoyl peroxide 5 % external liquid AAA every day prn  cholecalciferol (VITAMIN D3) 1,000 unit tablet TAKE 1 TABLET BY MOUTH DAILY  AMITIZA 24 mcg capsule TAKE 1 CAPSULE BY MOUTH TWICE DAILY WITH MEALS  fluconazole (DIFLUCAN) 150 mg tablet TAKE 1 TABLET BY MOUTH ONCE A MONTH ON 15TH  simvastatin (ZOCOR) 10 mg tablet TAKE 1 TABLET BY MOUTH AT BEDTIME FOR CHOLESTEROL  cetirizine (ZYRTEC) 10 mg tablet TAKE 1 TABLET BY MOUTH DAILY FOR ALLERGIES  fluticasone (FLONASE) 50 mcg/actuation nasal spray 2 Sprays by Both Nostrils route daily.  cromolyn (OPTICROM) 4 % ophthalmic solution Administer 1 Drop to both eyes four (4) times daily as needed. For itchy, watery eyes  nitrofurantoin, macrocrystal-monohydrate, (MACROBID) 100 mg capsule Take 1 Cap by mouth two (2) times a day.  insulin lispro (HUMALOG) 100 unit/mL kwikpen 8 units before breakfast , 8 units before  lunch and 4 units before dinner No humalog or Novolog at [id number] mg Add 2 units 201-250 mg Add 4  units 251-300 mg Add 6 units 301-350 mg Add 8 units 351-400mg Add 10 units If sugars are more than 450 then take her to hospital  
 metFORMIN (GLUCOPHAGE) 1,000 mg tablet TAKE 1 TABLET BY MOUTH IN THE MORNING AND IN THE EVENING WITH FOOD  Insulin Needles, Disposable, (BD INSULIN PEN NEEDLE UF ORIG) 29 gauge x 1/2\" ndle Use to inject insulin 4 times daily Dx Code: E11.65  
 insulin glargine (LANTUS SOLOSTAR) 100 unit/mL (3 mL) inpn Inject 24 units at bed time  LORazepam (ATIVAN) 1 mg tablet Take 1 mg by mouth as needed for Anxiety.  busPIRone (BUSPAR) 10 mg tablet Take 15 mg by mouth two (2) times a day.  polyethylene glycol (MIRALAX) 17 gram packet Take 17 g by mouth as needed.  minocycline (MINOCIN, DYNACIN) 50 mg capsule Take 50 mg by mouth daily.  ammonium lactate (LAC-HYDRIN) 12 % lotion Apply  to affected area two (2) times a day. rub in to affected area well  QUEtiapine SR (SEROQUEL XR) 300 mg sr tablet Take 400 mg by mouth nightly.  clomiPRAMINE (ANAFRANIL) 75 mg capsule Take 225 mg by mouth nightly.  clonazePAM (KLONOPIN) 2 mg tablet Take 2 mg by mouth three (3) times daily.  divalproex DR (DEPAKOTE) 500 mg tablet Take 1,000 mg by mouth daily.  naltrexone (DEPADE) 50 mg tablet Take 50 mg by mouth daily.  FLUoxetine (PROZAC) 20 mg capsule Take 20 mg by mouth daily.  risperiDONE (RISPERDAL) 3 mg tablet Take 8 mg by mouth two (2) times a day.  medroxyPROGESTERone (DEPO-PROVERA) 150 mg/mL injection 1 mL by IntraMUSCular route every three (3) months.  Cholecalciferol, Vitamin D3, (VITAMIN D3) 1,000 unit cap Take 1 Cap by mouth daily. No current facility-administered medications for this visit. Allergies Allergen Reactions  Other Plant, Animal, Environmental Sneezing Review of Systems: unable to obtain due to underlying mental status - Physical Examination: 
 Blood pressure 107/71, pulse (!) 108, temperature 96.2 °F (35.7 °C), resp. rate 18, height 5' 4\" (1.626 m), weight 183 lb 6.4 oz (83.2 kg), peak flow 99 L/min. Estimated body mass index is 31.48 kg/(m^2) as calculated from the following: 
  Height as of this encounter: 5' 4\" (1.626 m). -   Weight as of this encounter: 183 lb 6.4 oz (83.2 kg). - General: no distress 
- HEENT:  no periorbital edema,  
- Neck: supple, no thyromegaly - Cardiovascular: regular, normal rate, normal S1 and S2,  
- Respiratory: clear to auscultation bilaterally - Gastrointestinal: soft, nontender, nondistended,  BS + 
- Neurological:alert and not oriented - Psychiatric: restless 
- Skin: color, texture, turgor normal.  
 
Diabetic foot exam: Jan 2018 Left:   
 Vibratory sensation ND - non communicative Filament test  ND Pulse DP: 1+ Deformities: callus Right:  
 Vibratory sensation  ND Filament test -noncommunicative Pulse DP: 1+ Deformities: callus [facility] Data Reviewed:  
 
[] Glucose records reviewed. [] See glucose records for details (to be scanned). [] A1C [] Reviewed labs Lab Results Component Value Date/Time  Hemoglobin A1c 6.9 (H) 05/13/2016 10:54 AM  
 Hemoglobin A1c 7.4 (H) 02/17/2016 04:23 PM  
 Hemoglobin A1c 8.7 (H) 06/12/2015 02:50 PM  
 Glucose 149 (H) 07/25/2018 02:15 PM  
 Glucose  03/22/2018 01:41 PM  
 Microalb/Creat ratio (ug/mg creat.) <6.7 05/13/2016 10:54 AM  
 LDL, calculated 51 05/13/2016 10:54 AM  
 Creatinine 0.76 07/25/2018 02:15 PM  
 Hemoglobin A1c, External 7.5 12/16/2017 Hemoglobin A1c, External 8.7 09/24/2015 Hemoglobin A1c, External 8.0 09/22/2015 Lab Results Component Value Date/Time GFR est  07/25/2018 02:15 PM  
 GFR est non- 07/25/2018 02:15 PM  
 Creatinine 0.76 07/25/2018 02:15 PM  
 BUN 11 07/25/2018 02:15 PM  
 Sodium 136 07/25/2018 02:15 PM  
 Potassium 4.6 07/25/2018 02:15 PM  
 Chloride 98 07/25/2018 02:15 PM  
 CO2 14 (L) 07/25/2018 02:15 PM  
  
 
Assessment/Plan: 1. Uncontrolled type 2 diabetes mellitus with diabetic neuropathy, with long-term current use of insulin ([facility] [address] 1. Type 2 Diabetes Mellitus Lab Results Component Value Date/Time Hemoglobin A1c 6.9 (H) 05/13/2016 10:54 AM  
 Hemoglobin A1c (POC) 7.4 03/22/2018 01:42 PM  
 Hemoglobin A1c, External 7.5 12/16/2017 Lantus 30 units at bedtime Stressed the compliance with the diet, low-carb diet, suspect the weekend staff is giving her more carbs than she needs. Due to increase in her psychotropic medications she is also hungry and constantly asking for the food, reportedly gets agitated when she does not get something to eat. Metformin Novolog or Humalog  with meals Written instructions given Advised to check glucose  4 times daily 2. HTN :  cannot tolerate ACE 3. Hyperlipidemia : Continue statin. 4.Obesity:Body mass index is 31.48 kg/(m^2). Diet managed by Group home Caregiver verbalized understanding There are no Patient Instructions on file for this visit. Follow-up Disposition: Not on File Thank you for allowing me to participate in the care of this patient.  
 
Dawna Crespo MD

## 2018-10-10 NOTE — PATIENT INSTRUCTIONS
Check blood sugars before meals and at bedtime. Maintain the log and bring it all your appointments No insulin  If glucose is less than 70 If she has low glucose which is less than 70 - give her 4 oz juice and recheck every 15 minutes until it is more than 100 If the bedtime sugars are less than 100 ,give a 15 gm snack. And recheck until it is >100 
 
1800 Kcal diet , low carb , high protein Avoid juices and sodas Lantus insulin 30 units at bed time Novolog or Humalog 8 units before breakfast , 8 units before  lunch and 4 units before dinner No humalog or Novolog at bedtime Additional Novolog or Humalog for high sugars with meals 150-200 mg   Add 2 units 201-250 mg   Add 4  units 251-300 mg   Add 6 units 301-350 mg   Add 8 units 351-400 mg   Add 10 units If sugars are more than 450 then take her to hospital

## 2018-10-10 NOTE — MR AVS SNAPSHOT
[address] [facility] [address] 
[phone] Patient: More Lackey MRN: [id number] :1985 Visit Information Date & Time Provider Department Dept. Phone Encounter #  
 10/10/2018  1:45 PM Renetta Leung MD [facility] Diabetes & Endocrinology [phone] [id number] Follow-up Instructions Return in about 4 months (around 2/10/2019). Upcoming Health Maintenance Date Due Pneumococcal 19-64 Medium Risk (1 of 1 - PPSV23) 10/24/2002 DTaP/Tdap/Td series (1 - Tdap) 10/24/2004 PAP AKA CERVICAL CYTOLOGY 10/24/2004 HEMOGLOBIN A1C Q6M 2018 EYE EXAM RETINAL OR DILATED Q1 2019* Influenza Age 5 to Adult 2019* MICROALBUMIN Q1 2018 LIPID PANEL Q1 2018 FOOT EXAM Q1 2019 MEDICARE YEARLY EXAM 2019 *Topic was postponed. The date shown is not the original due date. Allergies as of 10/10/2018  Review Complete On: 10/10/2018 By: Renetta Leung MD  
  
 Severity Noted Reaction Type Reactions Other Plant, Animal, Environmental  2015    Sneezing Current Immunizations  Never Reviewed Name Date  
 TB Skin Test (PPD) Intradermal 2018 Not reviewed this visit You Were Diagnosed With   
  
 Codes Comments Uncontrolled type 2 diabetes mellitus with diabetic neuropathy, with long-term current use of insulin ([facility]    -  Primary ICD-10-CM: E11.40, Z79.4, E11.65 ICD-9-CM: 250.62, 357.2, V58.67 Mixed hyperlipidemia     ICD-10-CM: E78.2 ICD-9-CM: 272.2 Vitals BP Pulse Temp Resp Height(growth percentile) Weight(growth percentile) 107/71 (BP 1 Location: Left arm, BP Patient Position: Sitting) (!) 108 96.2 °F (35.7 °C) 18 5' 4\" (1.626 m) 183 lb 6.4 oz (83.2 kg) PF BMI OB Status Smoking Status  
  
  
 99 L/min 31.48 kg/m2 Injection Never Smoker Vitals History BMI and BSA Data Body Mass Index Body Surface Area  
 31.48 kg/m 2 1.94 m 2 Preferred Pharmacy Pharmacy Name Phone [facility] [address] [phone] Your Updated Medication List  
  
   
This list is accurate as of 10/10/18  2:24 PM.  Always use your most recent med list.  
  
  
  
  
 AMITIZA 24 mcg capsule Generic drug:  lubiPROStone TAKE 1 CAPSULE BY MOUTH TWICE DAILY WITH MEALS  
  
 ammonium lactate 12 % lotion Commonly known as:  LAC-HYDRIN Apply  to affected area two (2) times a day. rub in to affected area well  
  
 benzoyl peroxide 5 % external liquid AAA every day prn  
  
 busPIRone 10 mg tablet Commonly known as:  BUSPAR Take 15 mg by mouth two (2) times a day. cetirizine 10 mg tablet Commonly known as:  ZYRTEC  
TAKE 1 TABLET BY MOUTH DAILY FOR ALLERGIES  
  
 clomiPRAMINE 75 mg capsule Commonly known as:  ANAFRANIL Take 225 mg by mouth nightly. Indications: not taking  
  
 clonazePAM 2 mg tablet Commonly known as:  Mylinda  Take 2 mg by mouth three (3) times daily. Indications: no taking  
  
 cromolyn 4 % ophthalmic solution Commonly known as:  OPTICROM Administer 1 Drop to both eyes four (4) times daily as needed. For itchy, watery eyes DEPO-PROVERA 150 mg/mL injection Generic drug:  medroxyPROGESTERone 1 mL by IntraMUSCular route every three (3) months. divalproex  mg tablet Commonly known as:  DEPAKOTE Take 1,000 mg by mouth daily. fluconazole 150 mg tablet Commonly known as:  DIFLUCAN  
TAKE 1 TABLET BY MOUTH ONCE A MONTH ON 15TH FLUoxetine 20 mg capsule Commonly known as:  PROzac Take 20 mg by mouth daily. fluticasone 50 mcg/actuation nasal spray Commonly known as:  Melanie Marv 2 Sprays by Both Nostrils route daily. insulin glargine 100 unit/mL (3 mL) Inpn Commonly known as:  LANTUS SOLOSTAR U-100 INSULIN Inject 24 units at bed time insulin lispro 100 unit/mL kwikpen Commonly known as:  HUMALOG  
8 units before breakfast , 8 units before  lunch and 4 units before dinner No humalog or Novolog at [id number] mg Add 2 units 201-250 mg Add 4  units 251-300 mg Add 6 units 301-350 mg Add 8 units 351-400mg Add 10 units If sugars are more than 450 then take her to hospital  
  
 Insulin Needles (Disposable) 29 gauge x 1/2\" Ndle Commonly known as:  BD ULTRA-FINE ORIG PEN NEEDLE Use to inject insulin 4 times daily Dx Code: E11.65 LORazepam 1 mg tablet Commonly known as:  ATIVAN Take 2 mg by mouth two (2) times a day. metFORMIN 1,000 mg tablet Commonly known as:  GLUCOPHAGE  
TAKE 1 TABLET BY MOUTH IN THE MORNING AND IN THE EVENING WITH FOOD  
  
 metoprolol succinate 25 mg XL tablet Commonly known as:  TOPROL-XL  
TAKE 1 TABLET BY MOUTH DAILY  
  
 minocycline 50 mg capsule Commonly known as:  Julia Dheeraj Take 50 mg by mouth daily. Indications: not taking  
  
 naltrexone 50 mg tablet Commonly known as:  DEPADE Take 50 mg by mouth daily. nitrofurantoin (macrocrystal-monohydrate) 100 mg capsule Commonly known as:  MACROBID Take 1 Cap by mouth two (2) times a day. polyethylene glycol 17 gram packet Commonly known as:  Wilmon Stacks Take 17 g by mouth as needed. risperiDONE 3 mg tablet Commonly known as:  RisperDAL Take 4 mg by mouth two (2) times a day. SEROquel  mg sr tablet Generic drug:  QUEtiapine SR Take 400 mg by mouth nightly. simvastatin 10 mg tablet Commonly known as:  ZOCOR  
TAKE 1 TABLET BY MOUTH AT BEDTIME FOR CHOLESTEROL * VITAMIN D3 1,000 unit Cap Generic drug:  cholecalciferol Take 1 Cap by mouth daily. * cholecalciferol 1,000 unit tablet Commonly known as:  VITAMIN D3  
TAKE 1 TABLET BY MOUTH DAILY * Notice:   This list has 2 medication(s) that are the same as other medications prescribed for you. Read the directions carefully, and ask your doctor or other care provider to review them with you. We Performed the Following AMB POC GLUCOSE BLOOD, BY GLUCOSE MONITORING DEVICE [07837 CPT(R)] AMB POC HEMOGLOBIN A1C [94704 CPT(R)] Follow-up Instructions Return in about 4 months (around 2/10/2019). Patient Instructions Check blood sugars before meals and at bedtime. Maintain the log and bring it all your appointments No insulin  If glucose is less than 70 If she has low glucose which is less than 70 - give her 4 oz juice and recheck every 15 minutes until it is more than 100 If the bedtime sugars are less than 100 ,give a 15 gm snack. And recheck until it is >100 
 
1800 Kcal diet , low carb , high protein Avoid juices and sodas Lantus insulin 30 units at bed time Novolog or Humalog 8 units before breakfast , 8 units before  lunch and 4 units before dinner No humalog or Novolog at bedtime Additional Novolog or Humalog for high sugars with meals 150-200 mg   Add 2 units 201-250 mg   Add 4  units 251-300 mg   Add 6 units 301-350 mg   Add 8 units 351-400 mg   Add 10 units If sugars are more than 450 then take her to hospital 
 
 
  
Introducing [facility]! [facility] introduces MD2U patient portal. Now you can access parts of your medical record, email your doctor's office, and request medication refills online. 1. In your internet browser, go to [email or web address] 2. Click on the First Time User? Click Here link in the Sign In box. You will see the New Member Sign Up page. 3. Enter your MD2U Access Code exactly as it appears below. You will not need to use this code after youve completed the sign-up process. If you do not sign up before the expiration date, you must request a new code.  
 
· MD2U Access Code: [email or web address] 
 Expires: 11/22/2018  3:40 PM 
 
4. Enter the last four digits of your Social Security Number (xxxx) and Date of Birth (mm/dd/yyyy) as indicated and click Submit. You will be taken to the next sign-up page. 5. Create a [employer] ID. This will be your [employer] login ID and cannot be changed, so think of one that is secure and easy to remember. 6. Create a [employer] password. You can change your password at any time. 7. Enter your Password Reset Question and Answer. This can be used at a later time if you forget your password. 8. Enter your e-mail address. You will receive e-mail notification when new information is available in [address] 9. Click Sign Up. You can now view and download portions of your medical record. 10. Click the Download Summary menu link to download a portable copy of your medical information. If you have questions, please visit the Frequently Asked Questions section of the [employer] website. Remember, [employer] is NOT to be used for urgent needs. For medical emergencies, dial 911. Now available from your iPhone and Android! Please provide this summary of care documentation to your next provider. Your primary care clinician is listed as Viki Rodarte. If you have any questions after today's visit, please call [phone].

## 2018-12-20 ENCOUNTER — OFFICE VISIT (OUTPATIENT)
Dept: FAMILY MEDICINE CLINIC | Age: 33
End: 2018-12-20

## 2019-01-14 RX ORDER — POLYETHYLENE GLYCOL 3350 17 G/17G
POWDER, FOR SOLUTION ORAL
Qty: 527 G | Refills: 98 | Status: SHIPPED | OUTPATIENT
Start: 2019-01-14 | End: 2020-01-15 | Stop reason: SDUPTHER

## 2019-01-21 ENCOUNTER — OFFICE VISIT (OUTPATIENT)
Dept: NEUROLOGY | Age: 34
End: 2019-01-21

## 2019-01-21 VITALS
HEART RATE: 101 BPM | BODY MASS INDEX: 31.82 KG/M2 | HEIGHT: 64 IN | OXYGEN SATURATION: 95 % | RESPIRATION RATE: 20 BRPM | WEIGHT: 186.4 LBS

## 2019-01-21 DIAGNOSIS — R56.9 FOCAL SEIZURES (HCC): Primary | ICD-10-CM

## 2019-01-21 NOTE — PROGRESS NOTES
Neurology Consult      Subjective:      Patricia Telles is a 26 y.o. female who comes in from the [facility] center. I am seeing her today because she has a background history of seizures and that is a poorly developed story at best.  Mother was on the phone this afternoon and said she did not realize that what she was saying at times was covert seizure activity with stop and staring etc.  Apparently her Depakote was started since she is been at this residential home in the last 4 years. This medication is used by psychiatry for mood and behavior and is also on Ativan for behavioral control as well. Has background autism and explosive disorder and is also on BuSpar and has diabetes. Apparently saw a neurologist last year but could not go forward because there was no background information by which to start the case? Last seizure was apparently last Wednesday lasted about 20 minutes described as a staring episode? Is currently on Depakote 200 mg in the morning thousand milligrams in the p.m. Has not had an EEG since being at this resident home. Family history is negative for seizures. If she is happy she smiles and giggles and if she is unhappy the attendant says she inflicts self-harm and may be directed at others as well. Sleeping is quite erratic. Patient is of no help with any of the medical information and cannot really assist with the exam but is cordial today and allows some passive examination as it is. Current Outpatient Medications   Medication Sig Dispense Refill    polyethylene glycol (MIRALAX) 17 gram/dose powder MIX 1 CAPFUL (SEE 17 gm LINE) IN A GLASS OF WATER & DRINK ONCE DAILY AS NEEDED FOR CONSTIPATION.  527 g 98    medroxyPROGESTERone (DEPO-PROVERA) 150 mg/mL syrg INJECT 1 mL INTO THE MUSCLE EVERY 3 MONTHS 1 mL 1    insulin glargine (LANTUS SOLOSTAR U-100 INSULIN) 100 unit/mL (3 mL) inpn Inject 30 units at bed time 15 mL 11    metoprolol succinate (TOPROL-XL) 25 mg XL tablet TAKE 1 TABLET BY MOUTH DAILY 30 Tab 5    benzoyl peroxide 5 % external liquid AAA every day prn 1 Bottle 5    cholecalciferol (VITAMIN D3) 1,000 unit tablet TAKE 1 TABLET BY MOUTH DAILY 31 Tab PRN    AMITIZA 24 mcg capsule TAKE 1 CAPSULE BY MOUTH TWICE DAILY WITH MEALS 60 Cap PRN    fluconazole (DIFLUCAN) 150 mg tablet TAKE 1 TABLET BY MOUTH ONCE A MONTH ON 15TH 1 Tab PRN    simvastatin (ZOCOR) 10 mg tablet TAKE 1 TABLET BY MOUTH AT BEDTIME FOR CHOLESTEROL 30 Tab PRN    cetirizine (ZYRTEC) 10 mg tablet TAKE 1 TABLET BY MOUTH DAILY FOR ALLERGIES 30 Tab PRN    fluticasone (FLONASE) 50 mcg/actuation nasal spray 2 Sprays by Both Nostrils route daily. 1 Bottle 3    cromolyn (OPTICROM) 4 % ophthalmic solution Administer 1 Drop to both eyes four (4) times daily as needed. For itchy, watery eyes 10 mL 1    insulin lispro (HUMALOG) 100 unit/mL kwikpen 8 units before breakfast , 8 units before  lunch and 4 units before dinner No humalog or Novolog at [id number] mg Add 2 units 201-250 mg  Add 4  units 251-300 mg Add 6 units 301-350 mg Add 8 units 351-400mg Add 10 units If sugars are more than 450 then take her to hospital 15 mL 11    metFORMIN (GLUCOPHAGE) 1,000 mg tablet TAKE 1 TABLET BY MOUTH IN THE MORNING AND IN THE EVENING WITH FOOD 60 Tab 11    LORazepam (ATIVAN) 1 mg tablet Take 2 mg by mouth two (2) times a day.  busPIRone (BUSPAR) 10 mg tablet Take 30 mg by mouth two (2) times a day.  ammonium lactate (LAC-HYDRIN) 12 % lotion Apply  to affected area two (2) times a day. rub in to affected area well      divalproex DR (DEPAKOTE) 500 mg tablet Take 1,000 mg by mouth daily.  Cholecalciferol, Vitamin D3, (VITAMIN D3) 1,000 unit cap Take 1 Cap by mouth daily.  nitrofurantoin, macrocrystal-monohydrate, (MACROBID) 100 mg capsule Take 1 Cap by mouth two (2) times a day.  10 Cap 0    Insulin Needles, Disposable, (BD INSULIN PEN NEEDLE UF ORIG) 29 gauge x 1/2\" ndle Use to inject insulin 4 times daily Dx Code: E11.65 200 Pen Needle 11    polyethylene glycol (MIRALAX) 17 gram packet Take 17 g by mouth as needed.  minocycline (MINOCIN, DYNACIN) 50 mg capsule Take 50 mg by mouth daily. Indications: not taking      QUEtiapine SR (SEROQUEL XR) 300 mg sr tablet Take 400 mg by mouth nightly.  clomiPRAMINE (ANAFRANIL) 75 mg capsule Take 225 mg by mouth nightly. Indications: not taking      clonazePAM (KLONOPIN) 2 mg tablet Take 2 mg by mouth three (3) times daily. Indications: no taking      naltrexone (DEPADE) 50 mg tablet Take 50 mg by mouth daily.  FLUoxetine (PROZAC) 20 mg capsule Take 20 mg by mouth daily.  risperiDONE (RISPERDAL) 3 mg tablet Take 4 mg by mouth two (2) times a day. Allergies   Allergen Reactions    Other Plant, Animal, Environmental Sneezing     Past Medical History:   Diagnosis Date    Anxiety     Autism     Depression     [facility]     Intermittent explosive disorder     Seizures ([facility]       No past surgical history on file.    Social History     Socioeconomic History    Marital status: SINGLE     Spouse name: Not on file    Number of children: Not on file    Years of education: Not on file    Highest education level: Not on file   Social Needs    Financial resource strain: Not on file    Food insecurity - worry: Not on file    Food insecurity - inability: Not on file    Transportation needs - medical: Not on file   [employer] needs - non-medical: Not on file   Occupational History    Not on file   Tobacco Use    Smoking status: Never Smoker    Smokeless tobacco: Never Used   Substance and Sexual Activity    Alcohol use: No    Drug use: No    Sexual activity: No   Other Topics Concern    Not on file   Social History Narrative    Not on file      Family History   Problem Relation Age of Onset    Diabetes Mother     Diabetes Father     Heart Disease Father     Cancer Maternal Grandfather       Visit Vitals  Pulse (!) 101 Resp 20   Ht 5' 4\" (1.626 m)   Wt 84.6 kg (186 lb 6.4 oz)   SpO2 95%   BMI 32.00 kg/m²        Review of Systems:   A comprehensive review of systems was negative except for that written in the HPI. Neuro Exam:     Appearance: The patient is well developed, well nourished, unable to provide a coherent history and is in no acute distress. Mental Status: Oriented to name by inference. Mood and affect appropriate to baseline which includes nonverbal giggles and cordial today. .eye contact is there. .. Cranial Nerves:   Intact visual fields? Fundi are benign. RAULITO, EOM's full, no nystagmus, no ptosis. Facial sensation is normal. Corneal reflexes are intact. Facial movement is symmetric. Hearing is normal bilaterally. Palate is midline with normal sternocleidomastoid and trapezius muscles are normal. Tongue is midline. Motor:  5/5 strength in upper and lower proximal and distal muscles. Normal bulk and tone. No fasciculations. Reflexes:   Deep tendon reflexes 1-2+/4 and symmetrical.   Sensory:   Normal to touch, pinprick and vibration? Gait:  Normal gait. Tremor:   No tremor noted. Cerebellar:  No cerebellar signs present. Neurovascular:  Normal heart sounds and regular rhythm, peripheral pulses intact, and no carotid bruits. Assessment:   History of autism and what sounds like focal seizures? Would like to get baseline routine EEG and continue the Depakote. Supportive drugs could include Keppra or briviact or Vimpat etc.      Plan:   Revisit 6 months.   Signed by :  Elaina Sanchez MD

## 2019-01-29 ENCOUNTER — OFFICE VISIT (OUTPATIENT)
Dept: NEUROLOGY | Age: 34
End: 2019-01-29

## 2019-01-29 DIAGNOSIS — R56.9 SEIZURE (HCC): Primary | ICD-10-CM

## 2019-01-29 NOTE — PROGRESS NOTES
This was an elective routine EEG for evaluation of seizure history. Routine scalp leads were applied according to the 10-20 International system. EKG monitor as well. The background rhythm averaged about 8 Hz. Fair modulation with eye opening and closure. No evidence of focal slowing implied. As the record proceeds the technician notes head and body movement and at one point there is rather prominent electrographic phenomena seen which could be inferred to be motion artifact. At the time patient is chewing on a cracker according to technician. This goes on for period of 20-30 seconds and then intermittently thereafter. No other commentaries made. EKG grossly sinus rhythm. At some points evidence of uniform dampening of background consistent with patient drowsiness. Photic stim produced a subtle but present photic drive at different harmonics. Hyperventilation not performed. Impression: This is inferred to be a normal routine EEG. There is prominent motion artifact seen at a point when patient is chewing on a cracker. In retrospect, I cannot give this a confident reading of captured seizure activity, in light of that evidence. This EEG shows levels of alertness and interspersed with drowsiness. Clinical correlation is advised.   STEFANIE TERRAZAS.

## 2019-02-20 ENCOUNTER — OFFICE VISIT (OUTPATIENT)
Dept: ENDOCRINOLOGY | Age: 34
End: 2019-02-20

## 2019-02-20 VITALS
HEIGHT: 64 IN | OXYGEN SATURATION: 96 % | SYSTOLIC BLOOD PRESSURE: 110 MMHG | HEART RATE: 75 BPM | RESPIRATION RATE: 14 BRPM | WEIGHT: 186 LBS | BODY MASS INDEX: 31.76 KG/M2 | DIASTOLIC BLOOD PRESSURE: 81 MMHG

## 2019-02-20 DIAGNOSIS — E55.9 HYPOVITAMINOSIS D: ICD-10-CM

## 2019-02-20 DIAGNOSIS — Z79.4 TYPE 2 DIABETES MELLITUS WITH HYPERGLYCEMIA, WITH LONG-TERM CURRENT USE OF INSULIN (HCC): Primary | ICD-10-CM

## 2019-02-20 DIAGNOSIS — E78.2 MIXED HYPERLIPIDEMIA: ICD-10-CM

## 2019-02-20 DIAGNOSIS — E11.65 TYPE 2 DIABETES MELLITUS WITH HYPERGLYCEMIA, WITH LONG-TERM CURRENT USE OF INSULIN (HCC): Primary | ICD-10-CM

## 2019-02-20 NOTE — PROGRESS NOTES
Rukhsana Gaytan MD             Patient Information  Date:2/20/2019  Name : Kateryna Koch 26 y.o.     YOB: 1985         Referred by:  Group home       Chief Complaint   Patient presents with    Diabetes       History of Present Illness: Kateryna Koch is a 26 y.o. female here for fu of  Type 2 Diabetes Mellitus. Missed the appointment last week  She is on basal bolus regimen  No hypoglycemia, no hospitalization      Prior history  Fasting hyperglycemia and there are certain days where there are unexplained hyperglycemia   the only way to calm her is to give the  food is what I have learned and I suspect some of the staff members are giving her snacks whenever she is agitated. She lives in a Group home due to underlying Psychiatric condition     Here with care taker , does not communicate  Monitoring frequency:3 /day         Wt Readings from Last 3 Encounters:   02/20/19 186 lb (84.4 kg)   01/21/19 186 lb 6.4 oz (84.6 kg)   10/10/18 183 lb 6.4 oz (83.2 kg)       BP Readings from Last 3 Encounters:   02/20/19 110/81   10/10/18 107/71   08/24/18 138/89           Past Medical History:   Diagnosis Date    Anxiety     Autism     Depression     Diabetes ([address]     Intermittent explosive disorder     Seizures (HCC)      Current Outpatient Medications   Medication Sig    polyethylene glycol (MIRALAX) 17 gram/dose powder MIX 1 CAPFUL (SEE 17 gm LINE) IN A GLASS OF WATER & DRINK ONCE DAILY AS NEEDED FOR CONSTIPATION.     medroxyPROGESTERone (DEPO-PROVERA) 150 mg/mL syrg INJECT 1 mL INTO THE MUSCLE EVERY 3 MONTHS    insulin glargine (LANTUS SOLOSTAR U-100 INSULIN) 100 unit/mL (3 mL) inpn Inject 30 units at bed time    metoprolol succinate (TOPROL-XL) 25 mg XL tablet TAKE 1 TABLET BY MOUTH DAILY    benzoyl peroxide 5 % external liquid AAA every day prn    cholecalciferol (VITAMIN D3) 1,000 unit tablet TAKE 1 TABLET BY MOUTH DAILY    AMITIZA 24 mcg capsule TAKE 1 CAPSULE BY MOUTH TWICE DAILY WITH MEALS    fluconazole (DIFLUCAN) 150 mg tablet TAKE 1 TABLET BY MOUTH ONCE A MONTH ON 15TH    simvastatin (ZOCOR) 10 mg tablet TAKE 1 TABLET BY MOUTH AT BEDTIME FOR CHOLESTEROL    cetirizine (ZYRTEC) 10 mg tablet TAKE 1 TABLET BY MOUTH DAILY FOR ALLERGIES    fluticasone (FLONASE) 50 mcg/actuation nasal spray 2 Sprays by Both Nostrils route daily.  cromolyn (OPTICROM) 4 % ophthalmic solution Administer 1 Drop to both eyes four (4) times daily as needed. For itchy, watery eyes    nitrofurantoin, macrocrystal-monohydrate, (MACROBID) 100 mg capsule Take 1 Cap by mouth two (2) times a day.  insulin lispro (HUMALOG) 100 unit/mL kwikpen 8 units before breakfast , 8 units before  lunch and 4 units before dinner No humalog or Novolog at [id number] mg Add 2 units 201-250 mg  Add 4  units 251-300 mg Add 6 units 301-350 mg Add 8 units 351-400mg Add 10 units If sugars are more than 450 then take her to hospital    metFORMIN (GLUCOPHAGE) 1,000 mg tablet TAKE 1 TABLET BY MOUTH IN THE MORNING AND IN THE EVENING WITH FOOD    Insulin Needles, Disposable, (BD INSULIN PEN NEEDLE UF ORIG) 29 gauge x 1/2\" ndle Use to inject insulin 4 times daily Dx Code: E11.65    LORazepam (ATIVAN) 1 mg tablet Take 2 mg by mouth two (2) times a day.  busPIRone (BUSPAR) 10 mg tablet Take 30 mg by mouth two (2) times a day.  polyethylene glycol (MIRALAX) 17 gram packet Take 17 g by mouth as needed.  minocycline (MINOCIN, DYNACIN) 50 mg capsule Take 50 mg by mouth daily. Indications: not taking    ammonium lactate (LAC-HYDRIN) 12 % lotion Apply  to affected area two (2) times a day. rub in to affected area well    QUEtiapine SR (SEROQUEL XR) 300 mg sr tablet Take 400 mg by mouth nightly.  clomiPRAMINE (ANAFRANIL) 75 mg capsule Take 225 mg by mouth nightly. Indications: not taking    clonazePAM (KLONOPIN) 2 mg tablet Take 2 mg by mouth three (3) times daily. Indications: no taking    divalproex DR (DEPAKOTE) 500 mg tablet Take 1,000 mg by mouth daily.  naltrexone (DEPADE) 50 mg tablet Take 50 mg by mouth daily.  FLUoxetine (PROZAC) 20 mg capsule Take 20 mg by mouth daily.  risperiDONE (RISPERDAL) 3 mg tablet Take 4 mg by mouth two (2) times a day.  Cholecalciferol, Vitamin D3, (VITAMIN D3) 1,000 unit cap Take 1 Cap by mouth daily. No current facility-administered medications for this visit. Allergies   Allergen Reactions    Other Plant, Animal, Environmental Sneezing         Review of Systems: unable to obtain due to underlying mental status  -     Physical Examination:   Blood pressure 110/81, pulse 75, resp. rate 14, height 5' 4\" (1.626 m), weight 186 lb (84.4 kg), SpO2 96 %. Estimated body mass index is 31.93 kg/m² as calculated from the following:    Height as of this encounter: 5' 4\" (1.626 m). -   Weight as of this encounter: 186 lb (84.4 kg). - General: no distress  - HEENT:  no periorbital edema,   - Neck: supple, no thyromegaly  - Cardiovascular: regular, normal rate, normal S1 and S2,   - Respiratory: clear to auscultation bilaterally  - Gastrointestinal: soft, nontender, nondistended,  BS +  - Neurological:alert and not oriented      - Psychiatric: restless  - Skin: color, texture, turgor normal.     Diabetic foot exam: Feb 2019     Left:     Vibratory sensation ND - non communicative    Filament test  ND   Pulse DP: 1+    Deformities: callus   Right:    Vibratory sensation  ND    Filament test -noncommunicative   Pulse DP: 1+   Deformities: callus      . Data Reviewed:     [] Glucose records reviewed. [] See glucose records for details (to be scanned).   [] A1C  [] Reviewed labs    Lab Results   Component Value Date/Time    Hemoglobin A1c 7.4 (H) 02/07/2019 04:02 PM    Hemoglobin A1c 6.9 (H) 05/13/2016 10:54 AM    Hemoglobin A1c 7.4 (H) 02/17/2016 04:23 PM    Glucose 130 (H) 02/07/2019 04:02 PM    Glucose  10/10/2018 02:01 PM Microalb/Creat ratio (ug/mg creat.) <27.0 02/07/2019 04:02 PM    LDL, calculated 41 02/07/2019 04:02 PM    Creatinine 0.62 02/07/2019 04:02 PM    Hemoglobin A1c, External 7.5 12/16/2017    Hemoglobin A1c, External 8.7 09/24/2015    Hemoglobin A1c, External 8.0 09/22/2015      Lab Results   Component Value Date/Time    GFR est  02/07/2019 04:02 PM    GFR est non- 02/07/2019 04:02 PM    Creatinine 0.62 02/07/2019 04:02 PM    BUN 8 02/07/2019 04:02 PM    Sodium 133 (L) 02/07/2019 04:02 PM    Potassium 4.1 02/07/2019 04:02 PM    Chloride 93 (L) 02/07/2019 04:02 PM    CO2 20 02/07/2019 04:02 PM        Assessment/Plan:     1. Type 2 diabetes mellitus with hyperglycemia, with long-term current use of insulin ([address]    2. Mixed hyperlipidemia    3. Hypovitaminosis D        1. Type 2 Diabetes Mellitus   Lab Results   Component Value Date/Time    Hemoglobin A1c 7.4 (H) 02/07/2019 04:02 PM    Hemoglobin A1c (POC) 7.7 10/10/2018 02:10 PM    Hemoglobin A1c, External 7.5 12/16/2017     Lantus 30 units at bedtime  Stressed the compliance with the diet, low-carb diet, suspect the weekend staff is giving her more carbs than she needs. Due to increase in her psychotropic medications she is also hungry and constantly asking for the food, reportedly gets agitated when she does not get something to eat. Metformin  Novolog or Humalog  with meals     Written instructions given  Advised to check glucose  4 times daily      2. HTN :  cannot tolerate ACE    3. Hyperlipidemia : Continue statin. 4.Obesity:Body mass index is 31.93 kg/m². Diet managed by Group home      Caregiver verbalized understanding      There are no Patient Instructions on file for this visit. Follow-up Disposition: Not on File    Thank you for allowing me to participate in the care of this patient.     Alex Barajas MD

## 2019-02-20 NOTE — PROGRESS NOTES
Tonia Berumen is a 26 y.o. female here for   Chief Complaint   Patient presents with    Diabetes       Functional glucose monitor and record keeping system? - yes  Eye exam within last year? - on file  Foot exam within last year? - due    1. Have you been to the ER, urgent care clinic since your last visit? Hospitalized since your last visit? -no    2. Have you seen or consulted any other health care providers outside of the [address] [place] [address] since your last visit?   Include any pap smears or colon screening.-no

## 2019-04-04 DIAGNOSIS — E11.65 UNCONTROLLED TYPE 2 DIABETES MELLITUS WITH HYPERGLYCEMIA, WITH LONG-TERM CURRENT USE OF INSULIN (HCC): ICD-10-CM

## 2019-04-04 DIAGNOSIS — Z79.4 UNCONTROLLED TYPE 2 DIABETES MELLITUS WITH HYPERGLYCEMIA, WITH LONG-TERM CURRENT USE OF INSULIN (HCC): ICD-10-CM

## 2019-04-04 RX ORDER — INSULIN LISPRO 100 [IU]/ML
INJECTION, SOLUTION INTRAVENOUS; SUBCUTANEOUS
Qty: 6 ML | Refills: 11 | Status: SHIPPED | OUTPATIENT
Start: 2019-04-04 | End: 2019-06-25 | Stop reason: SDUPTHER

## 2019-04-04 NOTE — TELEPHONE ENCOUNTER
----- Message from Silvia Stevens sent at 4/4/2019  1:08 PM EDT -----  Regarding: DR Freedman/rx  Angella Auguste, from [facility] [phone], [place]  will be faxing over a authorization for for pt to get more refills for her Humalog, and will need it to be a rush order

## 2019-04-09 DIAGNOSIS — Z79.4 UNCONTROLLED TYPE 2 DIABETES MELLITUS WITH HYPERGLYCEMIA, WITH LONG-TERM CURRENT USE OF INSULIN (HCC): ICD-10-CM

## 2019-04-09 DIAGNOSIS — E11.65 UNCONTROLLED TYPE 2 DIABETES MELLITUS WITH HYPERGLYCEMIA, WITH LONG-TERM CURRENT USE OF INSULIN (HCC): ICD-10-CM

## 2019-04-09 RX ORDER — PEN NEEDLE, DIABETIC 29 G X1/2"
NEEDLE, DISPOSABLE MISCELLANEOUS
Qty: 100 PEN NEEDLE | Refills: 9 | Status: SHIPPED | OUTPATIENT
Start: 2019-04-09 | End: 2019-11-16 | Stop reason: SDUPTHER

## 2019-04-18 ENCOUNTER — TELEPHONE (OUTPATIENT)
Dept: ENDOCRINOLOGY | Age: 34
End: 2019-04-18

## 2019-04-18 NOTE — TELEPHONE ENCOUNTER
Pt's group home called stating pt's BG has been ranging in the 70-90's. She states group home was instructed to call if she has low BG. Pt is taking medications as prescribed.      4/1- 93 AM  4/7- 74 AM   90 lunch          62 lunch    133 dinner       140 dinner    148 bedtime           114 bedtime    4/2- 121 AM 4/8- 91   118 lunch                219   135 dinner              141   206 bedtime            85    4/3- 105 AM 4/9- 83   110 lunch        92    156 dinner              143   105 bedtime           156    4/4- 83 AM  4/10- 92   102 lunch          135    115 dinner                114   105 bedtime              96    4/5- 89 AM  4/11- 93   82 lunch                   114   137 dinner                141   147 bedtime             136    4/6- 86 AM  4/12- 126   68 lunch           79   242 dinner                 101   121 bedtime           201    4/13- 93   4/15- 146   85                              148   121                       150   105                            157    4/14- 95  4/16- 115AM   56                              94 dinner   109                            105 bedtime   76     4/17- 86 AM  4/ AM   97 dinner   75 bedtime

## 2019-04-19 ENCOUNTER — DOCUMENTATION ONLY (OUTPATIENT)
Dept: ENDOCRINOLOGY | Age: 34
End: 2019-04-19

## 2019-04-19 NOTE — PROGRESS NOTES
Decrease Lantus to 24 units   Continue other medications as precribed.      Call the office if Ms Ricky Randall continues to have low blood sugars.

## 2019-04-19 NOTE — PROGRESS NOTES
HISTORY OF PRESENT ILLNESS Valorie Hickey is a 26 y.o. female. HPI 
 
ROS Physical Exam 
 
ASSESSMENT and PLAN 
{ASSESSMENT/PLAN:36130}

## 2019-04-22 NOTE — TELEPHONE ENCOUNTER
Informed group home that Dr Teresa Oconnor asks that Lantus be decreased to 24 units and to call office if pt still has low BG. Updated medication instructions faxed to group home.

## 2019-05-14 RX ORDER — SIMVASTATIN 10 MG/1
TABLET, FILM COATED ORAL
Qty: 31 TAB | Status: SHIPPED | OUTPATIENT
Start: 2019-05-14 | End: 2020-05-15 | Stop reason: SDUPTHER

## 2019-05-14 RX ORDER — LUBIPROSTONE 24 UG/1
CAPSULE, GELATIN COATED ORAL
Qty: 62 CAP | Status: SHIPPED | OUTPATIENT
Start: 2019-05-14 | End: 2020-05-15 | Stop reason: SDUPTHER

## 2019-05-14 RX ORDER — CETIRIZINE HCL 10 MG
TABLET ORAL
Qty: 31 TAB | Status: SHIPPED | OUTPATIENT
Start: 2019-05-14 | End: 2019-07-25 | Stop reason: SDUPTHER

## 2019-05-22 RX ORDER — FLUCONAZOLE 150 MG/1
TABLET ORAL
Qty: 1 TAB | Status: SHIPPED | OUTPATIENT
Start: 2019-05-22 | End: 2021-06-08

## 2019-06-11 ENCOUNTER — OFFICE VISIT (OUTPATIENT)
Dept: FAMILY MEDICINE CLINIC | Age: 34
End: 2019-06-11

## 2019-06-11 VITALS
TEMPERATURE: 98.2 F | BODY MASS INDEX: 31.24 KG/M2 | OXYGEN SATURATION: 99 % | SYSTOLIC BLOOD PRESSURE: 118 MMHG | WEIGHT: 183 LBS | DIASTOLIC BLOOD PRESSURE: 85 MMHG | HEART RATE: 95 BPM | HEIGHT: 64 IN | RESPIRATION RATE: 16 BRPM

## 2019-06-11 DIAGNOSIS — R35.0 URINARY FREQUENCY: ICD-10-CM

## 2019-06-11 DIAGNOSIS — N30.01 ACUTE CYSTITIS WITH HEMATURIA: Primary | ICD-10-CM

## 2019-06-11 DIAGNOSIS — L75.0 URINARY BODY ODOR: ICD-10-CM

## 2019-06-11 LAB
BILIRUB UR QL STRIP: NEGATIVE
GLUCOSE UR-MCNC: ABNORMAL MG/DL
KETONES P FAST UR STRIP-MCNC: ABNORMAL MG/DL
PH UR STRIP: 6 [PH] (ref 4.6–8)
PROT UR QL STRIP: NEGATIVE
SP GR UR STRIP: 1.04 (ref 1–1.03)
UA UROBILINOGEN AMB POC: ABNORMAL (ref 0.2–1)
URINALYSIS CLARITY POC: ABNORMAL
URINALYSIS COLOR POC: YELLOW
URINE BLOOD POC: ABNORMAL
URINE LEUKOCYTES POC: ABNORMAL
URINE NITRITES POC: POSITIVE

## 2019-06-11 RX ORDER — NITROFURANTOIN 25; 75 MG/1; MG/1
100 CAPSULE ORAL 2 TIMES DAILY
Qty: 14 CAP | Refills: 0 | Status: SHIPPED | OUTPATIENT
Start: 2019-06-11 | End: 2019-07-25 | Stop reason: ALTCHOICE

## 2019-06-11 NOTE — PATIENT INSTRUCTIONS
Urinary Tract Infection in Women: Care Instructions  Your Care Instructions    A urinary tract infection, or UTI, is a general term for an infection anywhere between the kidneys and the urethra (where urine comes out). Most UTIs are bladder infections. They often cause pain or burning when you urinate. UTIs are caused by bacteria and can be cured with antibiotics. Be sure to complete your treatment so that the infection goes away. Follow-up care is a key part of your treatment and safety. Be sure to make and go to all appointments, and call your doctor if you are having problems. It's also a good idea to know your test results and keep a list of the medicines you take. How can you care for yourself at home? · Take your antibiotics as directed. Do not stop taking them just because you feel better. You need to take the full course of antibiotics. · Drink extra water and other fluids for the next day or two. This may help wash out the bacteria that are causing the infection. (If you have kidney, heart, or liver disease and have to limit fluids, talk with your doctor before you increase your fluid intake.)  · Avoid drinks that are carbonated or have caffeine. They can irritate the bladder. · Urinate often. Try to empty your bladder each time. · To relieve pain, take a hot bath or lay a heating pad set on low over your lower belly or genital area. Never go to sleep with a heating pad in place. To prevent UTIs  · Drink plenty of water each day. This helps you urinate often, which clears bacteria from your system. (If you have kidney, heart, or liver disease and have to limit fluids, talk with your doctor before you increase your fluid intake.)  · Urinate when you need to. · Urinate right after you have sex. · Change sanitary pads often. · Avoid douches, bubble baths, feminine hygiene sprays, and other feminine hygiene products that have deodorants.   · After going to the bathroom, wipe from front to back.  When should you call for help? Call your doctor now or seek immediate medical care if:    · Symptoms such as fever, chills, nausea, or vomiting get worse or appear for the first time.     · You have new pain in your back just below your rib cage. This is called flank pain.     · There is new blood or pus in your urine.     · You have any problems with your antibiotic medicine.    Watch closely for changes in your health, and be sure to contact your doctor if:    · You are not getting better after taking an antibiotic for 2 days.     · Your symptoms go away but then come back. Where can you learn more? Go to http://olivia-rohan.info/. Enter M454 in the search box to learn more about \"Urinary Tract Infection in Women: Care Instructions. \"  Current as of: March 20, 2018  Content Version: 11.9  © 1437-9641 [employer]. Care instructions adapted under license by [employer] (which disclaims liability or warranty for this information). If you have questions about a medical condition or this instruction, always ask your healthcare professional. [address] any warranty or liability for your use of this information.

## 2019-06-11 NOTE — PROGRESS NOTES
Marika Munguia is a 26 y.o. female   Chief Complaint   Patient presents with    Urinary Frequency    Urinary Odor    pt here with staff and states urine has been very strong odor and pt is urinating on herself which is out of her normal.  Pt is non verbal.  No fever no chills. she is a 26y.o. year old female who presents for evalution. Reviewed PmHx, RxHx, FmHx, SocHx, AllgHx and updated and dated in the chart. Review of Systems - negative except as listed above in the HPI    Objective:     Vitals:    06/11/19 1433   BP: 118/85   Pulse: 95   Resp: 16   Temp: 98.2 °F (36.8 °C)   TempSrc: Oral   SpO2: 99%   Weight: 183 lb (83 kg)   Height: 5' 4\" (1.626 m)       Current Outpatient Medications   Medication Sig    nitrofurantoin, macrocrystal-monohydrate, (MACROBID) 100 mg capsule Take 1 Cap by mouth two (2) times a day.  fluconazole (DIFLUCAN) 150 mg tablet TAKE 1 TABLET BY MOUTH ONCE A MONTH ON 15TH    cetirizine (ZYRTEC) 10 mg tablet TAKE 1 TABLET BY MOUTH DAILY FOR ALLERGIES    AMITIZA 24 mcg capsule TAKE 1 CAPSULE BY MOUTH TWICE DAILY WITH MEALS    simvastatin (ZOCOR) 10 mg tablet TAKE 1 TABLET BY MOUTH AT BEDTIME FOR CHOLESTEROL    insulin needles, disposable, (BD ULTRA-FINE ORIG PEN NEEDLE) 29 gauge x 1/2\" ndle USE TO INJECT INSULIN    insulin lispro (HUMALOG KWIKPEN INSULIN) 100 unit/mL kwikpen INJECT UNDER THE SKIN: 8 units BEFORE BREAKFAST,8 units BEFORE LUNCH & 4 units BEFORE DINNER + SLIDING SCALE (NONE AT BEDTIME)    metoprolol succinate (TOPROL-XL) 25 mg XL tablet TAKE 1 TABLET BY MOUTH DAILY    metFORMIN (GLUCOPHAGE) 1,000 mg tablet TAKE 1 TABLET BY MOUTH IN THE MORNING AND IN THE EVENING WITH FOOD    neomycin-colist-hydrocortisone-thonzonium (CORTISPORIN TC,COLY-MYCIN S) otic suspension Administer 5 Drops into each ear four (4) times daily.     polyethylene glycol (MIRALAX) 17 gram/dose powder MIX 1 CAPFUL (SEE 17 gm LINE) IN A GLASS OF WATER & DRINK ONCE DAILY AS NEEDED FOR CONSTIPATION.  medroxyPROGESTERone (DEPO-PROVERA) 150 mg/mL syrg INJECT 1 mL INTO THE MUSCLE EVERY 3 MONTHS    insulin glargine (LANTUS SOLOSTAR U-100 INSULIN) 100 unit/mL (3 mL) inpn Inject 30 units at bed time    benzoyl peroxide 5 % external liquid AAA every day prn    cholecalciferol (VITAMIN D3) 1,000 unit tablet TAKE 1 TABLET BY MOUTH DAILY    cetirizine (ZYRTEC) 10 mg tablet TAKE 1 TABLET BY MOUTH DAILY FOR ALLERGIES    fluticasone (FLONASE) 50 mcg/actuation nasal spray 2 Sprays by Both Nostrils route daily.  cromolyn (OPTICROM) 4 % ophthalmic solution Administer 1 Drop to both eyes four (4) times daily as needed. For itchy, watery eyes    LORazepam (ATIVAN) 1 mg tablet Take 2 mg by mouth two (2) times a day. 1 mg BID PRN    busPIRone (BUSPAR) 10 mg tablet Take 30 mg by mouth two (2) times a day.  polyethylene glycol (MIRALAX) 17 gram packet Take 17 g by mouth as needed.  ammonium lactate (LAC-HYDRIN) 12 % lotion Apply  to affected area two (2) times a day. rub in to affected area well    divalproex DR (DEPAKOTE) 250 mg tablet Take 250 mg by mouth daily. And 1000 mg QHS    risperiDONE (RISPERDAL) 4 mg tablet Take 8 mg by mouth two (2) times a day.  minocycline (MINOCIN, DYNACIN) 50 mg capsule Take 50 mg by mouth daily. Indications: not taking    QUEtiapine SR (SEROQUEL XR) 300 mg sr tablet Take 400 mg by mouth nightly.  clomiPRAMINE (ANAFRANIL) 75 mg capsule Take 225 mg by mouth nightly. Indications: not taking    clonazePAM (KLONOPIN) 2 mg tablet Take 2 mg by mouth three (3) times daily. Indications: no taking    naltrexone (DEPADE) 50 mg tablet Take 50 mg by mouth daily.  FLUoxetine (PROZAC) 20 mg capsule Take 20 mg by mouth daily. No current facility-administered medications for this visit.         Physical Examination: General appearance - alert, well appearing, and in no distress  Chest - clear to auscultation, no wheezes, rales or rhonchi, symmetric air entry  Heart - normal rate, regular rhythm, normal S1, S2, no murmurs, rubs, clicks or gallops      Assessment/ Plan:   Diagnoses and all orders for this visit:    1. Acute cystitis with hematuria  -     nitrofurantoin, macrocrystal-monohydrate, (MACROBID) 100 mg capsule; Take 1 Cap by mouth two (2) times a day. -     CULTURE, URINE    2. Urinary frequency  -     AMB POC URINALYSIS DIP STICK MANUAL W/O MICRO    3. Urinary body odor  -     AMB POC URINALYSIS DIP STICK MANUAL W/O MICRO       Follow-up and Dispositions    · Return if symptoms worsen or fail to improve. I have discussed the diagnosis with the patient and the intended plan as seen in the above orders. The patient has received an after-visit summary and questions were answered concerning future plans. Pt conveyed understanding of plan.     Medication Side Effects and Warnings were discussed with patient      Kylee Hayes DO

## 2019-06-11 NOTE — PROGRESS NOTES
Chief Complaint   Patient presents with    Urinary Frequency    Urinary Odor     Patient presents in office today with c/o urinary frequency and a strong odor to her urine since Friday. Caretaker states that she normally has control of her bladder but was urinating on her self. Also states that she has diabetes and her sugars have been elevated between 200-300. No other concerns. 1. Have you been to the ER, urgent care clinic since your last visit? Hospitalized since your last visit? No    2. Have you seen or consulted any other health care providers outside of the [address] [place] [address] since your last visit? Include any pap smears or colon screening.  No    Learning Assessment 5/20/2016   PRIMARY LEARNER Patient   PRIMARY LANGUAGE ENGLISH   LEARNER PREFERENCE PRIMARY DEMONSTRATION   ANSWERED BY Care Supervisor   RELATIONSHIP OTHER

## 2019-06-12 ENCOUNTER — DOCUMENTATION ONLY (OUTPATIENT)
Dept: ENDOCRINOLOGY | Age: 34
End: 2019-06-12

## 2019-06-12 NOTE — PROGRESS NOTES
Spoke with Marilynn Leyva with Group Home, explained the importance of patient keeping appointment and unable to regulate meds or give proper direction without an appointment. She understood and will bring her to the 6/19/19 appt.  Thank you bladimir

## 2019-06-14 LAB — BACTERIA UR CULT: ABNORMAL

## 2019-06-19 ENCOUNTER — DOCUMENTATION ONLY (OUTPATIENT)
Dept: ENDOCRINOLOGY | Age: 34
End: 2019-06-19

## 2019-06-19 NOTE — PROGRESS NOTES
No-show again for the appointment today on June 19  Did not show for the appointment on June 12, staff called from the office reminded the facility and they made the appointment.   Multiple no shows for the appointment  Patient is in the facility, nonverbal

## 2019-06-25 DIAGNOSIS — E11.65 UNCONTROLLED TYPE 2 DIABETES MELLITUS WITH HYPERGLYCEMIA, WITH LONG-TERM CURRENT USE OF INSULIN (HCC): ICD-10-CM

## 2019-06-25 DIAGNOSIS — Z79.4 UNCONTROLLED TYPE 2 DIABETES MELLITUS WITH HYPERGLYCEMIA, WITH LONG-TERM CURRENT USE OF INSULIN (HCC): ICD-10-CM

## 2019-06-26 RX ORDER — INSULIN LISPRO 100 [IU]/ML
INJECTION, SOLUTION INTRAVENOUS; SUBCUTANEOUS
Qty: 6 ML | Refills: 11 | Status: SHIPPED | OUTPATIENT
Start: 2019-06-26 | End: 2020-06-15

## 2019-07-11 RX ORDER — MELATONIN
Qty: 31 TAB | Status: SHIPPED | OUTPATIENT
Start: 2019-07-11 | End: 2020-06-13

## 2019-07-25 ENCOUNTER — OFFICE VISIT (OUTPATIENT)
Dept: NEUROLOGY | Age: 34
End: 2019-07-25

## 2019-07-25 VITALS
TEMPERATURE: 97 F | HEIGHT: 64 IN | WEIGHT: 185 LBS | OXYGEN SATURATION: 96 % | HEART RATE: 90 BPM | RESPIRATION RATE: 18 BRPM | SYSTOLIC BLOOD PRESSURE: 110 MMHG | DIASTOLIC BLOOD PRESSURE: 74 MMHG | BODY MASS INDEX: 31.58 KG/M2

## 2019-07-25 DIAGNOSIS — Z51.81 MEDICATION MONITORING ENCOUNTER: ICD-10-CM

## 2019-07-25 DIAGNOSIS — R56.9 FOCAL SEIZURES (HCC): Primary | ICD-10-CM

## 2019-07-25 NOTE — PROGRESS NOTES
[facility] and [address] at [facility] at [address] [facility] [address], [address] [facility] [address] [facility], [address] [place] [address]   [phone]              Chief Complaint   Patient presents with    Seizure     caregiver states pt has not had any seizures recently     Current Outpatient Medications   Medication Sig Dispense Refill    cholecalciferol (VITAMIN D3) 1,000 unit tablet TAKE 1 TABLET BY MOUTH DAILY 31 Tab PRN    insulin lispro (HUMALOG KWIKPEN INSULIN) 100 unit/mL kwikpen INJECT UNDER THE SKIN: 8 units BEFORE BREAKFAST,8 units BEFORE LUNCH & 4 units BEFORE DINNER + SLIDING SCALE (NONE AT BEDTIME) 6 mL 11    fluconazole (DIFLUCAN) 150 mg tablet TAKE 1 TABLET BY MOUTH ONCE A MONTH ON 15TH 1 Tab PRN    AMITIZA 24 mcg capsule TAKE 1 CAPSULE BY MOUTH TWICE DAILY WITH MEALS 62 Cap PRN    simvastatin (ZOCOR) 10 mg tablet TAKE 1 TABLET BY MOUTH AT BEDTIME FOR CHOLESTEROL 31 Tab PRN    insulin needles, disposable, (BD ULTRA-FINE ORIG PEN NEEDLE) 29 gauge x 1/2\" ndle USE TO INJECT INSULIN 100 Pen Needle 9    metoprolol succinate (TOPROL-XL) 25 mg XL tablet TAKE 1 TABLET BY MOUTH DAILY 31 Tab 5    metFORMIN (GLUCOPHAGE) 1,000 mg tablet TAKE 1 TABLET BY MOUTH IN THE MORNING AND IN THE EVENING WITH FOOD 62 Tab 11    neomycin-colist-hydrocortisone-thonzonium (CORTISPORIN TC,COLY-MYCIN S) otic suspension Administer 5 Drops into each ear four (4) times daily.  polyethylene glycol (MIRALAX) 17 gram/dose powder MIX 1 CAPFUL (SEE 17 gm LINE) IN A GLASS OF WATER & DRINK ONCE DAILY AS NEEDED FOR CONSTIPATION.  527 g 98    medroxyPROGESTERone (DEPO-PROVERA) 150 mg/mL syrg INJECT 1 mL INTO THE MUSCLE EVERY 3 MONTHS 1 mL 1    insulin glargine (LANTUS SOLOSTAR U-100 INSULIN) 100 unit/mL (3 mL) inpn Inject 30 units at bed time 15 mL 11    benzoyl peroxide 5 % external liquid AAA every day prn 1 Bottle 5    cetirizine (ZYRTEC) 10 mg tablet TAKE 1 TABLET BY MOUTH DAILY FOR ALLERGIES 30 Tab PRN    fluticasone (FLONASE) 50 mcg/actuation nasal spray 2 Sprays by Both Nostrils route daily. 1 Bottle 3    cromolyn (OPTICROM) 4 % ophthalmic solution Administer 1 Drop to both eyes four (4) times daily as needed. For itchy, watery eyes 10 mL 1    LORazepam (ATIVAN) 1 mg tablet Take 2 mg by mouth two (2) times a day. 1 mg BID PRN      busPIRone (BUSPAR) 10 mg tablet Take 30 mg by mouth two (2) times a day.  ammonium lactate (LAC-HYDRIN) 12 % lotion Apply  to affected area two (2) times a day. rub in to affected area well      divalproex DR (DEPAKOTE) 250 mg tablet Take 250 mg by mouth daily. And 1000 mg QHS      risperiDONE (RISPERDAL) 4 mg tablet Take 8 mg by mouth two (2) times a day.  minocycline (MINOCIN, DYNACIN) 50 mg capsule Take 50 mg by mouth daily. Indications: not taking      QUEtiapine SR (SEROQUEL XR) 300 mg sr tablet Take 400 mg by mouth nightly.  clomiPRAMINE (ANAFRANIL) 75 mg capsule Take 225 mg by mouth nightly. Indications: not taking      clonazePAM (KLONOPIN) 2 mg tablet Take 2 mg by mouth three (3) times daily. Indications: no taking      naltrexone (DEPADE) 50 mg tablet Take 50 mg by mouth daily.  FLUoxetine (PROZAC) 20 mg capsule Take 20 mg by mouth daily. Allergies   Allergen Reactions    Other Plant, Animal, Environmental Sneezing     Social History     Tobacco Use    Smoking status: Never Smoker    Smokeless tobacco: Never Used   Substance Use Topics    Alcohol use: No    Drug use: No     Patient presents today for follow-up accompanied by caregiver. She is a lady Dr. 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 EEG was done and was unremarkable. She is on Depakote presently. Her caregiver notes she is done very well.   This caregiver stays with her at night and is also with her during the day program.  She has not noticed any episodes or behavior activity suspicious for seizure. The patient has been compliant with her medicine. She is eating and drinking well. No new complaint. Examination  Visit Vitals  /74 (BP 1 Location: Left arm, BP Patient Position: Sitting)   Pulse 90   Temp 97 °F (36.1 °C) (Oral)   Resp 18   Ht 5' 4\" (1.626 m)   Wt 83.9 kg (185 lb)   SpO2 96%   BMI 31.76 kg/m²     Patient is calm sitting on examination table. Stigmata of static encephalopathy. She grunts and makes other noises. She follows some simple commands although needs some redirection. Is full versions without nystagmus. 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 We will go and get a Depakote level so we know where she is at this point and certainly if she starts having an increased frequency of seizure that will allow us to know should we increase Depakote or should we add another medication. Continue the same course for now. Follow with Dr. Jose L Nguyen in 3-4 months    Jennifer Devlin MD      This note was created using voice recognition software. Despite editing, there may be syntax errors. This note will not be viewable in [address]

## 2019-07-26 LAB — VALPROATE SERPL-MCNC: 82 UG/ML (ref 50–100)

## 2019-07-29 ENCOUNTER — TELEPHONE (OUTPATIENT)
Dept: ENDOCRINOLOGY | Age: 34
End: 2019-07-29

## 2019-07-29 DIAGNOSIS — E11.65 TYPE 2 DIABETES MELLITUS WITH HYPERGLYCEMIA, WITH LONG-TERM CURRENT USE OF INSULIN (HCC): Primary | ICD-10-CM

## 2019-07-29 DIAGNOSIS — Z79.4 TYPE 2 DIABETES MELLITUS WITH HYPERGLYCEMIA, WITH LONG-TERM CURRENT USE OF INSULIN (HCC): Primary | ICD-10-CM

## 2019-07-31 DIAGNOSIS — Z79.4 TYPE 2 DIABETES MELLITUS WITH HYPERGLYCEMIA, WITH LONG-TERM CURRENT USE OF INSULIN (HCC): Primary | ICD-10-CM

## 2019-07-31 DIAGNOSIS — E11.65 TYPE 2 DIABETES MELLITUS WITH HYPERGLYCEMIA, WITH LONG-TERM CURRENT USE OF INSULIN (HCC): Primary | ICD-10-CM

## 2019-07-31 RX ORDER — INSULIN PUMP SYRINGE, 3 ML
EACH MISCELLANEOUS
Qty: 1 KIT | Refills: 0 | Status: SHIPPED | OUTPATIENT
Start: 2019-07-31

## 2019-07-31 RX ORDER — LANCETS 28 GAUGE
EACH MISCELLANEOUS
Qty: 300 LANCET | Refills: 3 | Status: SHIPPED | OUTPATIENT
Start: 2019-07-31 | End: 2020-10-21 | Stop reason: SDUPTHER

## 2019-07-31 NOTE — TELEPHONE ENCOUNTER
----- Message from Ursula Daigle sent at 7/31/2019  2:04 PM EDT -----  Regarding: Dr Marie Ascencio first and last name: [id number] [address]      Reason for call: Teresa Esquivel is reporting that a request was sent from [address] [place] [address] regarding pt's Rx for her \"Embrace supplies\" which is no longer covered by her insurance. [address] [place] [address] does not carry the \"Embrace\", needs a new Rx for a different monitor and test strips. Please review the fax for any missing information. Pt is completely out of supplies.  This is an attempt to expedite the request.    Callback required yes/no and why:      Best contact number(s):[phone] or ([phone] After 4:00 pm)      Details to clarify the request:      Ursula Daigle

## 2019-09-11 DIAGNOSIS — I10 ESSENTIAL HYPERTENSION: ICD-10-CM

## 2019-09-11 DIAGNOSIS — R00.0 TACHYCARDIA: ICD-10-CM

## 2019-09-11 RX ORDER — METOPROLOL SUCCINATE 25 MG/1
TABLET, EXTENDED RELEASE ORAL
Qty: 30 TAB | Status: SHIPPED | OUTPATIENT
Start: 2019-09-11 | End: 2021-01-20

## 2019-09-17 RX ORDER — FLUTICASONE PROPIONATE 50 MCG
2 SPRAY, SUSPENSION (ML) NASAL DAILY
Qty: 1 BOTTLE | Refills: 3 | Status: SHIPPED | OUTPATIENT
Start: 2019-09-17 | End: 2020-12-09

## 2019-10-01 NOTE — PROGRESS NOTES
Argelia Mcdermott is a 26 y.o. female here for   Chief Complaint   Patient presents with    Diabetes       Functional glucose monitor and record keeping system? -yes   Eye exam within last year? - due  Foot exam within last year? - on file    1. Have you been to the ER, urgent care clinic since your last visit? Hospitalized since your last visit? -no    2. Have you seen or consulted any other health care providers outside of the [address] [place] [address] since your last visit?   Include any pap smears or colon screening.-no

## 2019-10-02 ENCOUNTER — OFFICE VISIT (OUTPATIENT)
Dept: ENDOCRINOLOGY | Age: 34
End: 2019-10-02

## 2019-10-02 VITALS
WEIGHT: 176 LBS | HEIGHT: 64 IN | SYSTOLIC BLOOD PRESSURE: 105 MMHG | HEART RATE: 82 BPM | DIASTOLIC BLOOD PRESSURE: 71 MMHG | BODY MASS INDEX: 30.05 KG/M2

## 2019-10-02 DIAGNOSIS — E78.2 MIXED HYPERLIPIDEMIA: ICD-10-CM

## 2019-10-02 DIAGNOSIS — Z79.4 TYPE 2 DIABETES MELLITUS WITH HYPERGLYCEMIA, WITH LONG-TERM CURRENT USE OF INSULIN (HCC): Primary | ICD-10-CM

## 2019-10-02 DIAGNOSIS — E11.65 TYPE 2 DIABETES MELLITUS WITH HYPERGLYCEMIA, WITH LONG-TERM CURRENT USE OF INSULIN (HCC): Primary | ICD-10-CM

## 2019-10-02 DIAGNOSIS — E11.65 UNCONTROLLED TYPE 2 DIABETES MELLITUS WITH HYPERGLYCEMIA, WITH LONG-TERM CURRENT USE OF INSULIN (HCC): ICD-10-CM

## 2019-10-02 DIAGNOSIS — Z79.4 UNCONTROLLED TYPE 2 DIABETES MELLITUS WITH HYPERGLYCEMIA, WITH LONG-TERM CURRENT USE OF INSULIN (HCC): ICD-10-CM

## 2019-10-02 RX ORDER — INSULIN GLARGINE 100 [IU]/ML
INJECTION, SOLUTION SUBCUTANEOUS
Qty: 15 ML | Refills: 11 | Status: SHIPPED | OUTPATIENT
Start: 2019-10-02 | End: 2020-06-25 | Stop reason: SDUPTHER

## 2019-10-02 NOTE — PROGRESS NOTES
Aixa Delarosa MD             Patient Information  Date:10/2/2019  Name : Gwendolyn Vera 26 y.o.     YOB: 1985         Referred by:  Group home       Chief Complaint   Patient presents with    Diabetes       History of Present Illness: Gwendolyn Vera is a 26 y.o. female here for fu of  Type 2 Diabetes Mellitus. She did not show up for 3 appointments, discussed with the /nursing home manager the importance of follow-up    She is on basal bolus regimen  No hypoglycemia, no hospitalization  Adjusted the insulin in between the visits, no hypoglycemia now    Prior history  Fasting hyperglycemia and there are certain days where there are unexplained hyperglycemia   the only way to calm her is to give the  food is what I have learned and I suspect some of the staff members are giving her snacks whenever she is agitated. She lives in a Group home due to underlying Psychiatric condition     Here with care taker , does not communicate  Monitoring frequency:3 /day         Wt Readings from Last 3 Encounters:   10/02/19 176 lb (79.8 kg)   07/25/19 185 lb (83.9 kg)   06/11/19 183 lb (83 kg)       BP Readings from Last 3 Encounters:   10/02/19 (!) 85/67   07/25/19 110/74   06/11/19 118/85           Past Medical History:   Diagnosis Date    Anxiety     Autism     Depression     Diabetes (HCC)     Intermittent explosive disorder     Seizures (HCC)      Current Outpatient Medications   Medication Sig    fluticasone propionate (FLONASE) 50 mcg/actuation nasal spray 2 Sprays by Both Nostrils route daily.  metoprolol succinate (TOPROL-XL) 25 mg XL tablet TAKE 1 TABLET BY MOUTH DAILY    lancets (PRODIGY LANCETS) 28 gauge misc Test TID Dx Code: E11.65    Blood-Glucose Meter (PRODIGY AUTOCODE METER) monitoring kit Test TID Dx Code: E11.65    glucose blood VI test strips (PRODIGY NO CODING) strip Use to check BG 3x daily.  E11.65  cholecalciferol (VITAMIN D3) 1,000 unit tablet TAKE 1 TABLET BY MOUTH DAILY    insulin lispro (HUMALOG KWIKPEN INSULIN) 100 unit/mL kwikpen INJECT UNDER THE SKIN: 8 units BEFORE BREAKFAST,8 units BEFORE LUNCH & 4 units BEFORE DINNER + SLIDING SCALE (NONE AT BEDTIME)    fluconazole (DIFLUCAN) 150 mg tablet TAKE 1 TABLET BY MOUTH ONCE A MONTH ON 15TH    AMITIZA 24 mcg capsule TAKE 1 CAPSULE BY MOUTH TWICE DAILY WITH MEALS    simvastatin (ZOCOR) 10 mg tablet TAKE 1 TABLET BY MOUTH AT BEDTIME FOR CHOLESTEROL    insulin needles, disposable, (BD ULTRA-FINE ORIG PEN NEEDLE) 29 gauge x 1/2\" ndle USE TO INJECT INSULIN    metFORMIN (GLUCOPHAGE) 1,000 mg tablet TAKE 1 TABLET BY MOUTH IN THE MORNING AND IN THE EVENING WITH FOOD    neomycin-colist-hydrocortisone-thonzonium (CORTISPORIN TC,COLY-MYCIN S) otic suspension Administer 5 Drops into each ear four (4) times daily.  polyethylene glycol (MIRALAX) 17 gram/dose powder MIX 1 CAPFUL (SEE 17 gm LINE) IN A GLASS OF WATER & DRINK ONCE DAILY AS NEEDED FOR CONSTIPATION.  medroxyPROGESTERone (DEPO-PROVERA) 150 mg/mL syrg INJECT 1 mL INTO THE MUSCLE EVERY 3 MONTHS    insulin glargine (LANTUS SOLOSTAR U-100 INSULIN) 100 unit/mL (3 mL) inpn Inject 30 units at bed time (Patient taking differently: Inject 24 units at bed time)    benzoyl peroxide 5 % external liquid AAA every day prn    cetirizine (ZYRTEC) 10 mg tablet TAKE 1 TABLET BY MOUTH DAILY FOR ALLERGIES    cromolyn (OPTICROM) 4 % ophthalmic solution Administer 1 Drop to both eyes four (4) times daily as needed. For itchy, watery eyes    LORazepam (ATIVAN) 1 mg tablet Take 2 mg by mouth two (2) times a day. 1 mg BID PRN    busPIRone (BUSPAR) 10 mg tablet Take 30 mg by mouth two (2) times a day.  ammonium lactate (LAC-HYDRIN) 12 % lotion Apply  to affected area two (2) times a day. rub in to affected area well    divalproex DR (DEPAKOTE) 250 mg tablet Take 250 mg by mouth daily.  And 1000 mg QHS    risperiDONE (RISPERDAL) 4 mg tablet Take 8 mg by mouth two (2) times a day. No current facility-administered medications for this visit. Allergies   Allergen Reactions    Other Plant, Animal, Environmental Sneezing         Review of Systems: unable to obtain due to underlying mental status  -     Physical Examination:   Blood pressure (!) 85/67, pulse (!) 106, height 5' 4\" (1.626 m), weight 176 lb (79.8 kg). Estimated body mass index is 30.21 kg/m² as calculated from the following:    Height as of this encounter: 5' 4\" (1.626 m). -   Weight as of this encounter: 176 lb (79.8 kg). - General: no distress  - HEENT:  no periorbital edema,   - Neck: supple, no thyromegaly  - Cardiovascular: regular, normal rate, normal S1 and S2,   - Respiratory: clear to auscultation bilaterally  - Gastrointestinal: soft, nontender, nondistended,  BS +  - Neurological:alert and not oriented      - Psychiatric: restless  - Skin: color, texture, turgor normal.     Diabetic foot exam: Feb 2019     Left:     Vibratory sensation ND - non communicative    Filament test  ND   Pulse DP: 1+    Deformities: callus   Right:    Vibratory sensation  ND    Filament test -noncommunicative   Pulse DP: 1+   Deformities: callus      . Data Reviewed:     [] Glucose records reviewed. [] See glucose records for details (to be scanned).   [] A1C  [] Reviewed labs    Lab Results   Component Value Date/Time    Hemoglobin A1c 7.4 (H) 02/07/2019 04:02 PM    Hemoglobin A1c 6.9 (H) 05/13/2016 10:54 AM    Hemoglobin A1c 7.4 (H) 02/17/2016 04:23 PM    Glucose 130 (H) 02/07/2019 04:02 PM    Glucose  10/10/2018 02:01 PM    Microalb/Creat ratio (ug/mg creat.) <27.0 02/07/2019 04:02 PM    LDL, calculated 41 02/07/2019 04:02 PM    Creatinine 0.62 02/07/2019 04:02 PM    Hemoglobin A1c, External 7.5 12/16/2017    Hemoglobin A1c, External 8.7 09/24/2015    Hemoglobin A1c, External 8.0 09/22/2015      Lab Results   Component Value Date/Time    GFR est AA 135 02/07/2019 04:02 PM    GFR est non- 02/07/2019 04:02 PM    Creatinine 0.62 02/07/2019 04:02 PM    BUN 8 02/07/2019 04:02 PM    Sodium 133 (L) 02/07/2019 04:02 PM    Potassium 4.1 02/07/2019 04:02 PM    Chloride 93 (L) 02/07/2019 04:02 PM    CO2 20 02/07/2019 04:02 PM        Assessment/Plan:     1. Type 2 diabetes mellitus with hyperglycemia, with long-term current use of insulin ([address]    2. Mixed hyperlipidemia        1. Type 2 Diabetes Mellitus   Lab Results   Component Value Date/Time    Hemoglobin A1c 7.4 (H) 02/07/2019 04:02 PM    Hemoglobin A1c (POC) 7.7 10/10/2018 02:10 PM    Hemoglobin A1c, External 7.5 12/16/2017     Lantus 24 units at bedtime    Stressed the compliance with the diet, low-carb diet,. Due to increase in her psychotropic medications she is also hungry and constantly asking for the food, reportedly gets agitated when she does not get something to eat. Metformin  Novolog or Humalog  with meals     Written instructions given  Advised to check glucose  4 times daily      2. HTN :  cannot tolerate ACE    3. Hyperlipidemia : Continue statin. 4.Obesity:Body mass index is 30.21 kg/m². Diet managed by Group home      Caregiver verbalized understanding      There are no Patient Instructions on file for this visit. Thank you for allowing me to participate in the care of this patient.     Mary Sullivan MD

## 2019-10-02 NOTE — LETTER
10/3/19 Patient: Janes Nick YOB: 1985 Date of Visit: 10/2/2019 Marlene Ivory NP 
[address] [place] [address] [place] [id number] VIA Facsimile: [phone] Dear Marlene Ivory NP, Thank you for referring Ms. Mohini Rice to [id number] [place] [address] for evaluation. My notes for this consultation are attached. If you have questions, please do not hesitate to call me. I look forward to following your patient along with you. Sincerely, Elizabeth Mckeon MD

## 2019-11-12 ENCOUNTER — TELEPHONE (OUTPATIENT)
Dept: ENDOCRINOLOGY | Age: 34
End: 2019-11-12

## 2019-11-16 DIAGNOSIS — Z79.4 UNCONTROLLED TYPE 2 DIABETES MELLITUS WITH HYPERGLYCEMIA, WITH LONG-TERM CURRENT USE OF INSULIN (HCC): ICD-10-CM

## 2019-11-16 DIAGNOSIS — E11.65 UNCONTROLLED TYPE 2 DIABETES MELLITUS WITH HYPERGLYCEMIA, WITH LONG-TERM CURRENT USE OF INSULIN (HCC): ICD-10-CM

## 2019-11-18 RX ORDER — PEN NEEDLE, DIABETIC 29 G X1/2"
NEEDLE, DISPOSABLE MISCELLANEOUS
Qty: 100 PEN NEEDLE | Refills: 19 | Status: SHIPPED | OUTPATIENT
Start: 2019-11-18 | End: 2020-11-11

## 2020-01-04 DIAGNOSIS — E11.65 TYPE 2 DIABETES MELLITUS WITH HYPERGLYCEMIA, WITH LONG-TERM CURRENT USE OF INSULIN (HCC): ICD-10-CM

## 2020-01-04 DIAGNOSIS — Z79.4 TYPE 2 DIABETES MELLITUS WITH HYPERGLYCEMIA, WITH LONG-TERM CURRENT USE OF INSULIN (HCC): ICD-10-CM

## 2020-01-15 RX ORDER — POLYETHYLENE GLYCOL 3350 17 G/17G
POWDER, FOR SOLUTION ORAL
Qty: 527 G | Refills: 11 | Status: SHIPPED | OUTPATIENT
Start: 2020-01-15 | End: 2022-06-10

## 2020-02-08 RX ORDER — MAGNESIUM SULFATE 100 %
CRYSTALS MISCELLANEOUS
Qty: 10 TAB | Refills: 11 | Status: SHIPPED | OUTPATIENT
Start: 2020-02-08 | End: 2021-02-04 | Stop reason: SDUPTHER

## 2020-02-24 ENCOUNTER — TELEPHONE (OUTPATIENT)
Dept: ENDOCRINOLOGY | Age: 35
End: 2020-02-24

## 2020-02-24 DIAGNOSIS — Z79.4 TYPE 2 DIABETES MELLITUS WITH HYPERGLYCEMIA, WITH LONG-TERM CURRENT USE OF INSULIN (HCC): Primary | ICD-10-CM

## 2020-02-24 DIAGNOSIS — E11.65 TYPE 2 DIABETES MELLITUS WITH HYPERGLYCEMIA, WITH LONG-TERM CURRENT USE OF INSULIN (HCC): Primary | ICD-10-CM

## 2020-02-24 DIAGNOSIS — E78.2 MIXED HYPERLIPIDEMIA: ICD-10-CM

## 2020-02-24 NOTE — TELEPHONE ENCOUNTER
Order placed for pt per verbal order with read back from Dr. Violetta Bailey 02/24/20    Order faxed.

## 2020-02-24 NOTE — TELEPHONE ENCOUNTER
Patient needs a [facility] form faxed to [phone] carlyn WALKER  Would like to go and get it done this am. Thank you

## 2020-02-26 LAB
ALBUMIN SERPL-MCNC: 3.8 G/DL (ref 3.8–4.8)
ALBUMIN/CREAT UR: <5 MG/G CREAT (ref 0–29)
ALBUMIN/GLOB SERPL: 1.5 {RATIO} (ref 1.2–2.2)
ALP SERPL-CCNC: 63 IU/L (ref 39–117)
ALT SERPL-CCNC: 9 IU/L (ref 0–32)
AST SERPL-CCNC: 12 IU/L (ref 0–40)
BILIRUB SERPL-MCNC: <0.2 MG/DL (ref 0–1.2)
BUN SERPL-MCNC: 7 MG/DL (ref 6–20)
BUN/CREAT SERPL: 10 (ref 9–23)
CALCIUM SERPL-MCNC: 9.2 MG/DL (ref 8.7–10.2)
CHLORIDE SERPL-SCNC: 103 MMOL/L (ref 96–106)
CO2 SERPL-SCNC: 14 MMOL/L (ref 20–29)
CREAT SERPL-MCNC: 0.71 MG/DL (ref 0.57–1)
CREAT UR-MCNC: 65.7 MG/DL
EST. AVERAGE GLUCOSE BLD GHB EST-MCNC: 154 MG/DL
GLOBULIN SER CALC-MCNC: 2.5 G/DL (ref 1.5–4.5)
GLUCOSE SERPL-MCNC: 178 MG/DL (ref 65–99)
HBA1C MFR BLD: 7 % (ref 4.8–5.6)
MICROALBUMIN UR-MCNC: <3 UG/ML
POTASSIUM SERPL-SCNC: 4.5 MMOL/L (ref 3.5–5.2)
PROT SERPL-MCNC: 6.3 G/DL (ref 6–8.5)
SODIUM SERPL-SCNC: 139 MMOL/L (ref 134–144)

## 2020-03-02 ENCOUNTER — OFFICE VISIT (OUTPATIENT)
Dept: ENDOCRINOLOGY | Age: 35
End: 2020-03-02

## 2020-03-02 VITALS
SYSTOLIC BLOOD PRESSURE: 105 MMHG | HEIGHT: 64 IN | BODY MASS INDEX: 29.53 KG/M2 | WEIGHT: 173 LBS | RESPIRATION RATE: 14 BRPM | TEMPERATURE: 96 F | DIASTOLIC BLOOD PRESSURE: 68 MMHG | HEART RATE: 81 BPM | OXYGEN SATURATION: 100 %

## 2020-03-02 DIAGNOSIS — F84.0 ACTIVE AUTISTIC DISORDER: ICD-10-CM

## 2020-03-02 DIAGNOSIS — Z79.4 TYPE 2 DIABETES MELLITUS WITH HYPERGLYCEMIA, WITH LONG-TERM CURRENT USE OF INSULIN (HCC): Primary | ICD-10-CM

## 2020-03-02 DIAGNOSIS — E11.65 TYPE 2 DIABETES MELLITUS WITH HYPERGLYCEMIA, WITH LONG-TERM CURRENT USE OF INSULIN (HCC): Primary | ICD-10-CM

## 2020-03-02 DIAGNOSIS — E78.2 MIXED HYPERLIPIDEMIA: ICD-10-CM

## 2020-03-02 NOTE — PROGRESS NOTES
Lane Canela is a 37 y.o. female here for   Chief Complaint   Patient presents with    Diabetes       1. Have you been to the ER, urgent care clinic since your last visit? Hospitalized since your last visit? -no    2. Have you seen or consulted any other health care providers outside of the [address] [place] [address] since your last visit?   Include any pap smears or colon screening.-no

## 2020-03-02 NOTE — PATIENT INSTRUCTIONS
Check blood sugars before meals and at bedtime. Maintain the log and bring it all your appointments No insulin  If glucose is less than 70 If she has low glucose which is less than 70 - give her 4 oz juice and recheck every 15 minutes until it is more than 100 If the bedtime sugars are less than 100 ,give a 15 gm snack. And recheck until it is >100 
 
1800 Kcal diet , low carb , high protein Avoid juices and sodas Lantus insulin 24 units at bed time Novolog or Humalog 8 units before breakfast , 8 units before  lunch and 4 units before dinner No humalog or Novolog at bedtime Additional Novolog or Humalog for high sugars with meals 150-200 mg   Add 2 units 201-250 mg   Add 4  units 251-300 mg   Add 6 units 301-350 mg   Add 8 units 351-400 mg   Add 10 units If sugars are more than 450 then take her to hospital

## 2020-03-02 NOTE — PROGRESS NOTES
Kathryn Nascimento MD             Patient Information  Date:3/2/2020  Name : Yoly Parsons 37 y.o.     YOB: 1985         Referred by:  Group home       Chief Complaint   Patient presents with    Diabetes       History of Present Illness: Yoly Parsons is a 37 y.o. female here for fu of  Type 2 Diabetes Mellitus. She did not show up for 3 appointments, discussed with the /nursing home manager the importance of follow-up in the past    She is on basal bolus regimen  No hypoglycemia, no hospitalization  Reviewed the log, no documentation of amount of insulin given    Prior history  Fasting hyperglycemia and there are certain days where there are unexplained hyperglycemia   the only way to calm her is to give the  food is what I have learned and I suspect some of the staff members are giving her snacks whenever she is agitated. She lives in a Group home due to underlying Psychiatric condition     Here with care taker , does not communicate  Monitoring frequency:3 /day         Wt Readings from Last 3 Encounters:   03/02/20 173 lb (78.5 kg)   10/02/19 176 lb (79.8 kg)   07/25/19 185 lb (83.9 kg)       BP Readings from Last 3 Encounters:   03/02/20 (!) 89/62   10/02/19 105/71   07/25/19 110/74           Past Medical History:   Diagnosis Date    Anxiety     Autism     Depression     Diabetes (HCC)     Intermittent explosive disorder     Seizures (HCC)      Current Outpatient Medications   Medication Sig    glucose 4 gram chewable tablet CHEW & SWALLOW 3 TABLETS BY MOUTH IF BLOOD GLUCOSE LESS THAN 70, WAIT15 MINUTES & RECHECK. REPEAT 1 TIME IF NEEDED. CALL 911 IF NO RESPONSE    polyethylene glycol (MIRALAX) 17 gram/dose powder MIX 1 CAPFUL (SEE 17 gm LINE) IN A GLASS OF WATER & DRINK ONCE DAILY AS NEEDED FOR CONSTIPATION.     glucose blood VI test strips (ONETOUCH VERIO) strip USE TO TEST BLOOD SUGAR THREE TIMES DAILY    medroxyPROGESTERone (DEPO-PROVERA) 150 mg/mL syrg INJECT EVERY 3 MONTHS    insulin needles, disposable, (BD ULTRA-FINE ORIG PEN NEEDLE) 29 gauge x 1/2\" ndle USE TO INJECT INSULIN    insulin glargine (LANTUS SOLOSTAR U-100 INSULIN) 100 unit/mL (3 mL) inpn Inject 24 units at bed time (Patient taking differently: Inject 30 units at bed time)    fluticasone propionate (FLONASE) 50 mcg/actuation nasal spray 2 Sprays by Both Nostrils route daily.  metoprolol succinate (TOPROL-XL) 25 mg XL tablet TAKE 1 TABLET BY MOUTH DAILY    lancets (PRODIGY LANCETS) 28 gauge misc Test TID Dx Code: E11.65    Blood-Glucose Meter (PRODIGY AUTOCODE METER) monitoring kit Test TID Dx Code: E11.65    cholecalciferol (VITAMIN D3) 1,000 unit tablet TAKE 1 TABLET BY MOUTH DAILY    insulin lispro (HUMALOG KWIKPEN INSULIN) 100 unit/mL kwikpen INJECT UNDER THE SKIN: 8 units BEFORE BREAKFAST,8 units BEFORE LUNCH & 4 units BEFORE DINNER + SLIDING SCALE (NONE AT BEDTIME)    fluconazole (DIFLUCAN) 150 mg tablet TAKE 1 TABLET BY MOUTH ONCE A MONTH ON 15TH    AMITIZA 24 mcg capsule TAKE 1 CAPSULE BY MOUTH TWICE DAILY WITH MEALS    simvastatin (ZOCOR) 10 mg tablet TAKE 1 TABLET BY MOUTH AT BEDTIME FOR CHOLESTEROL    metFORMIN (GLUCOPHAGE) 1,000 mg tablet TAKE 1 TABLET BY MOUTH IN THE MORNING AND IN THE EVENING WITH FOOD    neomycin-colist-hydrocortisone-thonzonium (CORTISPORIN TC,COLY-MYCIN S) otic suspension Administer 5 Drops into each ear four (4) times daily.  benzoyl peroxide 5 % external liquid AAA every day prn    cetirizine (ZYRTEC) 10 mg tablet TAKE 1 TABLET BY MOUTH DAILY FOR ALLERGIES    cromolyn (OPTICROM) 4 % ophthalmic solution Administer 1 Drop to both eyes four (4) times daily as needed. For itchy, watery eyes    LORazepam (ATIVAN) 1 mg tablet Take 2 mg by mouth two (2) times a day. 1 mg BID PRN    busPIRone (BUSPAR) 10 mg tablet Take 30 mg by mouth two (2) times a day.     ammonium lactate (LAC-HYDRIN) 12 % lotion Apply  to affected area two (2) times a day. rub in to affected area well    divalproex DR (DEPAKOTE) 250 mg tablet Take 250 mg by mouth daily. And 1000 mg QHS    risperiDONE (RISPERDAL) 4 mg tablet Take 8 mg by mouth two (2) times a day. No current facility-administered medications for this visit. Allergies   Allergen Reactions    Other Plant, Animal, Environmental Sneezing         Review of Systems: unable to obtain due to underlying mental status  -     Physical Examination:   Blood pressure (!) 89/62, pulse 92, temperature 96 °F (35.6 °C), temperature source Oral, resp. rate 14, height 5' 4\" (1.626 m), weight 173 lb (78.5 kg), SpO2 100 %. Estimated body mass index is 29.7 kg/m² as calculated from the following:    Height as of this encounter: 5' 4\" (1.626 m). -   Weight as of this encounter: 173 lb (78.5 kg).   - General: no distress  - HEENT:  no periorbital edema,   - Neck: supple, no thyromegaly  - Cardiovascular: regular, normal rate, normal S1 and S2,   - Respiratory: clear to auscultation bilaterally  - Gastrointestinal: soft, nontender, nondistended,  BS +  - Neurological:alert and not oriented      - Psychiatric: restless  - Skin: color, texture, turgor normal.     Diabetic foot exam: Feb 2019     Left:     Vibratory sensation ND - non communicative    Filament test  ND   Pulse DP: 1+    Deformities: callus   Right:    Vibratory sensation  ND    Filament test -noncommunicative   Pulse DP: 1+   Deformities: callus          Lab Results   Component Value Date/Time    Hemoglobin A1c 7.0 (H) 02/24/2020 01:17 PM    Hemoglobin A1c 7.4 (H) 02/07/2019 04:02 PM    Hemoglobin A1c 6.9 (H) 05/13/2016 10:54 AM    Glucose 178 (H) 02/24/2020 01:17 PM    Glucose  10/10/2018 02:01 PM    Microalb/Creat ratio (ug/mg creat.) <5 02/24/2020 01:17 PM    LDL, calculated 41 02/07/2019 04:02 PM    Creatinine 0.71 02/24/2020 01:17 PM    Hemoglobin A1c, External 6.8 08/22/2019    Hemoglobin A1c, External 7.5 12/16/2017    Hemoglobin A1c, External 8.7 09/24/2015      Lab Results   Component Value Date/Time    GFR est  02/24/2020 01:17 PM    GFR est non- 02/24/2020 01:17 PM    Creatinine 0.71 02/24/2020 01:17 PM    BUN 7 02/24/2020 01:17 PM    Sodium 139 02/24/2020 01:17 PM    Potassium 4.5 02/24/2020 01:17 PM    Chloride 103 02/24/2020 01:17 PM    CO2 14 (L) 02/24/2020 01:17 PM        Assessment/Plan:     1. Type 2 diabetes mellitus with hyperglycemia, with long-term current use of insulin ([address]    2. Mixed hyperlipidemia        1. Type 2 Diabetes Mellitus   Lab Results   Component Value Date/Time    Hemoglobin A1c 7.0 (H) 02/24/2020 01:17 PM    Hemoglobin A1c (POC) 7.7 10/10/2018 02:10 PM    Hemoglobin A1c, External 6.8 08/22/2019     Lantus 24 units at bedtime  Metformin  Novolog or Humalog  with meals   Stressed the compliance with the diet, low-carb diet,. Due to increase in her psychotropic medications she is also hungry and constantly asking for the food, reportedly gets agitated when she does not get something to eat. Written instructions given, printed log given, need the insulin dose given for each meal documented in the log, to be brought to every visit  Advised to check glucose  4 times daily      2. HTN :  cannot tolerate ACE    3. Hyperlipidemia : Continue statin. 4.Obesity:Body mass index is 29.7 kg/m². Diet managed by Group home      Caregiver verbalized understanding      There are no Patient Instructions on file for this visit. Thank you for allowing me to participate in the care of this patient.     Nel White MD

## 2020-03-11 DIAGNOSIS — E11.65 UNCONTROLLED TYPE 2 DIABETES MELLITUS WITH HYPERGLYCEMIA, WITH LONG-TERM CURRENT USE OF INSULIN (HCC): ICD-10-CM

## 2020-03-11 DIAGNOSIS — Z79.4 UNCONTROLLED TYPE 2 DIABETES MELLITUS WITH HYPERGLYCEMIA, WITH LONG-TERM CURRENT USE OF INSULIN (HCC): ICD-10-CM

## 2020-03-11 RX ORDER — METFORMIN HYDROCHLORIDE 1000 MG/1
TABLET ORAL
Qty: 62 TAB | Refills: 11 | Status: SHIPPED | OUTPATIENT
Start: 2020-03-11 | End: 2021-03-15

## 2020-05-15 RX ORDER — CETIRIZINE HCL 10 MG
TABLET ORAL
Qty: 31 TAB | Refills: 11 | Status: SHIPPED | OUTPATIENT
Start: 2020-05-15 | End: 2021-04-12

## 2020-05-15 RX ORDER — SIMVASTATIN 10 MG/1
TABLET, FILM COATED ORAL
Qty: 31 TAB | Refills: 11 | Status: SHIPPED | OUTPATIENT
Start: 2020-05-15 | End: 2021-04-12

## 2020-05-15 RX ORDER — LUBIPROSTONE 24 UG/1
CAPSULE, GELATIN COATED ORAL
Qty: 62 CAP | Refills: 11 | Status: SHIPPED | OUTPATIENT
Start: 2020-05-15 | End: 2021-04-12

## 2020-06-12 DIAGNOSIS — E11.65 UNCONTROLLED TYPE 2 DIABETES MELLITUS WITH HYPERGLYCEMIA, WITH LONG-TERM CURRENT USE OF INSULIN (HCC): ICD-10-CM

## 2020-06-12 DIAGNOSIS — Z79.4 UNCONTROLLED TYPE 2 DIABETES MELLITUS WITH HYPERGLYCEMIA, WITH LONG-TERM CURRENT USE OF INSULIN (HCC): ICD-10-CM

## 2020-06-13 RX ORDER — MELATONIN
Qty: 30 TAB | Refills: 11 | Status: SHIPPED | OUTPATIENT
Start: 2020-06-13 | End: 2021-06-14

## 2020-06-15 RX ORDER — INSULIN LISPRO 100 [IU]/ML
INJECTION, SOLUTION INTRAVENOUS; SUBCUTANEOUS
Qty: 6 ML | Refills: 11 | Status: SHIPPED | OUTPATIENT
Start: 2020-06-15 | End: 2020-06-25 | Stop reason: SDUPTHER

## 2020-06-18 ENCOUNTER — DOCUMENTATION ONLY (OUTPATIENT)
Dept: ENDOCRINOLOGY | Age: 35
End: 2020-06-18

## 2020-06-18 NOTE — PROGRESS NOTES
Massage from [facility] - I have attempted to contact the facility both yesterday 06/18/20 at 10:54 a.m. and asked for the clinical management information and again today at 3:55 p.m. They have a call service which does not provide this information. Then name of facility is [facility].      I have talked to Era Soria and she will try to reach the facility     We have reminded them multiple times in the past

## 2020-06-25 ENCOUNTER — VIRTUAL VISIT (OUTPATIENT)
Dept: ENDOCRINOLOGY | Age: 35
End: 2020-06-25

## 2020-06-25 DIAGNOSIS — Z79.4 UNCONTROLLED TYPE 2 DIABETES MELLITUS WITH HYPERGLYCEMIA, WITH LONG-TERM CURRENT USE OF INSULIN (HCC): ICD-10-CM

## 2020-06-25 DIAGNOSIS — E11.65 UNCONTROLLED TYPE 2 DIABETES MELLITUS WITH HYPERGLYCEMIA, WITH LONG-TERM CURRENT USE OF INSULIN (HCC): ICD-10-CM

## 2020-06-25 DIAGNOSIS — Z79.4 TYPE 2 DIABETES MELLITUS WITH HYPERGLYCEMIA, WITH LONG-TERM CURRENT USE OF INSULIN (HCC): Primary | ICD-10-CM

## 2020-06-25 DIAGNOSIS — E11.65 TYPE 2 DIABETES MELLITUS WITH HYPERGLYCEMIA, WITH LONG-TERM CURRENT USE OF INSULIN (HCC): ICD-10-CM

## 2020-06-25 DIAGNOSIS — E11.65 TYPE 2 DIABETES MELLITUS WITH HYPERGLYCEMIA, WITH LONG-TERM CURRENT USE OF INSULIN (HCC): Primary | ICD-10-CM

## 2020-06-25 DIAGNOSIS — F84.0 ACTIVE AUTISTIC DISORDER: ICD-10-CM

## 2020-06-25 DIAGNOSIS — E78.2 MIXED HYPERLIPIDEMIA: ICD-10-CM

## 2020-06-25 DIAGNOSIS — Z79.4 TYPE 2 DIABETES MELLITUS WITH HYPERGLYCEMIA, WITH LONG-TERM CURRENT USE OF INSULIN (HCC): ICD-10-CM

## 2020-06-25 RX ORDER — INSULIN GLARGINE 100 [IU]/ML
INJECTION, SOLUTION SUBCUTANEOUS
Qty: 15 ML | Refills: 11 | Status: SHIPPED | OUTPATIENT
Start: 2020-06-25 | End: 2020-06-25 | Stop reason: SDUPTHER

## 2020-06-25 NOTE — PROGRESS NOTES
Lisbet Altman is a 37 y.o. female here for   Chief Complaint   Patient presents with    Diabetes       1. Have you been to the ER, urgent care clinic since your last visit? Hospitalized since your last visit? - no    2. Have you seen or consulted any other health care providers outside of the [address] [place] [address] since your last visit?   Include any pap smears or colon screening.- no

## 2020-06-25 NOTE — PROGRESS NOTES
Clifford Xavier MD             Patient Information  Date:6/27/2020  Name : Alysia Castaneda  [address] y.o[place]ira     YOB: 1985         Referred by:  Group home    Marleneem Tang       Chief Complaint   Patient presents with    Diabetes     Pursuant to the emergency declaration under the [facility] [address] authority and the [employer] Act, this Virtual  Visit was conducted, with patient's consent, to reduce the patient's risk of exposure to COVID-19 . Patient  is aware that this is a billable encounter and is responsible for copays/deductibles       Services were provided through a video synchronous discussion virtually to substitute for in-person clinic visit. Place of service: Provider : Office  Patient: Home  History of Present Illness: Alysia Castaneda is a  [address] y.o[address] female here for fu of  Type 2 Diabetes Mellitus. She did not show up for 3 appointments, discussed with the /nursing home manager the importance of follow-up in the past.  After the last visit again did not show for the appointment. Discussed with the assisted living supervisor again. She is on basal bolus regimen  No hypoglycemia, no hospitalization      Prior history  Fasting hyperglycemia and there are certain days where there are unexplained hyperglycemia   the only way to calm her is to give the  food is what I have learned and I suspect some of the staff members are giving her snacks whenever she is agitated.       She lives in a Group home due to underlying Psychiatric condition     Here with care taker , does not communicate  Monitoring frequency:3 /day         Wt Readings from Last 3 Encounters:   03/02/20 173 lb (78.5 kg)   10/02/19 176 lb (79.8 kg)   07/25/19 185 lb (83.9 kg)       BP Readings from Last 3 Encounters:   03/02/20 105/68   10/02/19 105/71   07/25/19 110/74           Past Medical History:   Diagnosis Date    Anxiety     Autism     Depression     Diabetes ([facility] [address]     Intermittent explosive disorder     Seizures (HCC)      Current Outpatient Medications   Medication Sig    insulin glargine (Lantus Solostar U-100 Insulin) 100 unit/mL (3 mL) inpn Inject 24 units at bed time    insulin lispro (HumaLOG KwikPen Insulin) 100 unit/mL kwikpen INJECT UNDER THE SKIN: 8 units BEFORE BREAKFAST,8 units BEFORE LUNCH & 4 units BEFORE DINNER + SLIDING SCALE (NONE AT BEDTIME)    cholecalciferol (VITAMIN D3) (1000 Units /25 mcg) tablet TAKE 1 TABLET BY MOUTH DAILY    simvastatin (ZOCOR) 10 mg tablet TAKE 1 TABLET BY MOUTH AT BEDTIME FOR CHOLESTEROL    Amitiza 24 mcg capsule TAKE 1 CAPSULE BY MOUTH TWICE DAILY WITH MEALS    cetirizine (ZYRTEC) 10 mg tablet TAKE 1 TABLET BY MOUTH DAILY FOR ALLERGIES    metFORMIN (GLUCOPHAGE) 1,000 mg tablet TAKE 1 TABLET BY MOUTH IN THE MORNING AND IN THE EVENING WITH FOOD    glucose 4 gram chewable tablet CHEW & SWALLOW 3 TABLETS BY MOUTH IF BLOOD GLUCOSE LESS THAN 70, WAIT15 MINUTES & RECHECK. REPEAT 1 TIME IF NEEDED. CALL 911 IF NO RESPONSE    polyethylene glycol (MIRALAX) 17 gram/dose powder MIX 1 CAPFUL (SEE 17 gm LINE) IN A GLASS OF WATER & DRINK ONCE DAILY AS NEEDED FOR CONSTIPATION.  glucose blood VI test strips (ONETOUCH VERIO) strip USE TO TEST BLOOD SUGAR THREE TIMES DAILY    insulin needles, disposable, (BD ULTRA-FINE ORIG PEN NEEDLE) 29 gauge x 1/2\" ndle USE TO INJECT INSULIN    fluticasone propionate (FLONASE) 50 mcg/actuation nasal spray 2 Sprays by Both Nostrils route daily.     metoprolol succinate (TOPROL-XL) 25 mg XL tablet TAKE 1 TABLET BY MOUTH DAILY    lancets (PRODIGY LANCETS) 28 gauge misc Test TID Dx Code: E11.65    fluconazole (DIFLUCAN) 150 mg tablet TAKE 1 TABLET BY MOUTH ONCE A MONTH ON 15TH    neomycin-colist-hydrocortisone-thonzonium (CORTISPORIN TC,COLY-MYCIN S) otic suspension Administer 5 Drops into each ear four (4) times daily.  benzoyl peroxide 5 % external liquid AAA every day prn    LORazepam (ATIVAN) 1 mg tablet Take 2 mg by mouth two (2) times a day. 1 mg BID PRN    busPIRone (BUSPAR) 10 mg tablet Take 30 mg by mouth two (2) times a day.  ammonium lactate (LAC-HYDRIN) 12 % lotion Apply  to affected area two (2) times a day. rub in to affected area well    divalproex DR (DEPAKOTE) 250 mg tablet Take 250 mg by mouth daily. And 1000 mg QHS    risperiDONE (RISPERDAL) 4 mg tablet Take 4 mg by mouth two (2) times a day.  medroxyPROGESTERone (DEPO-PROVERA) 150 mg/mL syrg INJECT EVERY 3 MONTHS    Blood-Glucose Meter (PRODIGY AUTOCODE METER) monitoring kit Test TID Dx Code: E11.65    cromolyn (OPTICROM) 4 % ophthalmic solution Administer 1 Drop to both eyes four (4) times daily as needed. For itchy, watery eyes     No current facility-administered medications for this visit. Allergies   Allergen Reactions    Other Plant, Animal, Environmental Sneezing         Review of Systems: unable to obtain due to underlying mental status  -     Physical Examination:   There were no vitals taken for this visit. Estimated body mass index is 29.7 kg/m² as calculated from the following:    Height as of 3/2/20: 5' 4\" (1.626 m). -   Weight as of 3/2/20: 173 lb (78.5 kg).   - General: pleasant, no distress, good eye contact  - HEENT: no exophthalmos, no periorbital edema, EOMI  - Neck: No visible thyromegaly  - RS: Normal respiratory effort  - Musculoskeletal: no tremors  - Neurological: alert and oriented  - Psychiatric: normal mood and affect  - Skin: Normal color      -     Diabetic foot exam: Feb 2019     Left:     Vibratory sensation ND - non communicative    Filament test  ND   Pulse DP: 1+    Deformities: callus   Right:    Vibratory sensation  ND    Filament test -noncommunicative   Pulse DP: 1+   Deformities: callus          Lab Results   Component Value Date/Time    Hemoglobin A1c 7.0 (H) 02/24/2020 01:17 PM    Hemoglobin A1c 7.4 (H) 02/07/2019 04:02 PM    Hemoglobin A1c 6.9 (H) 05/13/2016 10:54 AM    Glucose 178 (H) 02/24/2020 01:17 PM    Glucose  10/10/2018 02:01 PM    Microalb/Creat ratio (ug/mg creat.) <5 02/24/2020 01:17 PM    LDL, calculated 41 02/07/2019 04:02 PM    Creatinine 0.71 02/24/2020 01:17 PM    Hemoglobin A1c, External 6.8 08/22/2019    Hemoglobin A1c, External 7.5 12/16/2017    Hemoglobin A1c, External 8.7 09/24/2015      Lab Results   Component Value Date/Time    GFR est  02/24/2020 01:17 PM    GFR est non- 02/24/2020 01:17 PM    Creatinine 0.71 02/24/2020 01:17 PM    BUN 7 02/24/2020 01:17 PM    Sodium 139 02/24/2020 01:17 PM    Potassium 4.5 02/24/2020 01:17 PM    Chloride 103 02/24/2020 01:17 PM    CO2 14 (L) 02/24/2020 01:17 PM        Assessment/Plan:     1. Type 2 diabetes mellitus with hyperglycemia, with long-term current use of insulin ([address]    2. Mixed hyperlipidemia    3. Uncontrolled type 2 diabetes mellitus with hyperglycemia, with long-term current use of insulin ([address]    4. Active autistic disorder        1. Type 2 Diabetes Mellitus   Lab Results   Component Value Date/Time    Hemoglobin A1c 7.0 (H) 02/24/2020 01:17 PM    Hemoglobin A1c (POC) 7.7 10/10/2018 02:10 PM    Hemoglobin A1c, External 6.8 08/22/2019     Lantus 24 units at bedtime  Metformin  Novolog or Humalog  with meals   Stressed the compliance with the diet, low-carb diet,. Due to increase in her psychotropic medications she is also hungry and constantly asking for the food, reportedly gets agitated when she does not get something to eat. 2.  HTN :  cannot tolerate ACE    3. Hyperlipidemia : Continue statin. 4.Obesity:There is no height or weight on file to calculate BMI. Diet managed by Group home      Caregiver verbalized understanding      There are no Patient Instructions on file for this visit.   Follow-up and Dispositions    · Return in about 3 months (around 9/25/2020). Thank you for allowing me to participate in the care of this patient.     Stephanie Ma MD

## 2020-06-27 RX ORDER — INSULIN GLARGINE 100 [IU]/ML
INJECTION, SOLUTION SUBCUTANEOUS
Qty: 15 ML | Refills: 11 | Status: SHIPPED | OUTPATIENT
Start: 2020-06-27 | End: 2021-06-11

## 2020-06-27 RX ORDER — INSULIN LISPRO 100 [IU]/ML
INJECTION, SOLUTION INTRAVENOUS; SUBCUTANEOUS
Qty: 15 ML | Refills: 11 | Status: SHIPPED | OUTPATIENT
Start: 2020-06-27 | End: 2020-12-16 | Stop reason: SDUPTHER

## 2020-07-13 DIAGNOSIS — E11.65 TYPE 2 DIABETES MELLITUS WITH HYPERGLYCEMIA, WITH LONG-TERM CURRENT USE OF INSULIN (HCC): ICD-10-CM

## 2020-07-13 DIAGNOSIS — Z79.4 TYPE 2 DIABETES MELLITUS WITH HYPERGLYCEMIA, WITH LONG-TERM CURRENT USE OF INSULIN (HCC): ICD-10-CM

## 2020-07-13 RX ORDER — BLOOD SUGAR DIAGNOSTIC
STRIP MISCELLANEOUS
Qty: 100 STRIP | Refills: 11 | Status: SHIPPED | OUTPATIENT
Start: 2020-07-13 | End: 2020-10-14 | Stop reason: SDUPTHER

## 2020-07-21 RX ORDER — GLUCOSAM/CHON-MSM1/C/MANG/BOSW 500-416.6
TABLET ORAL
Qty: 100 LANCET | Refills: 0 | Status: SHIPPED | OUTPATIENT
Start: 2020-07-21 | End: 2020-09-14

## 2020-07-21 RX ORDER — BLOOD-GLUCOSE METER
EACH MISCELLANEOUS
Qty: 1 EACH | Refills: 0 | Status: SHIPPED | OUTPATIENT
Start: 2020-07-21

## 2020-09-14 RX ORDER — GLUCOSAM/CHON-MSM1/C/MANG/BOSW 500-416.6
TABLET ORAL
Qty: 100 LANCET | Refills: 11 | Status: SHIPPED | OUTPATIENT
Start: 2020-09-14 | End: 2020-10-21 | Stop reason: SDUPTHER

## 2020-09-22 ENCOUNTER — OFFICE VISIT (OUTPATIENT)
Dept: ENDOCRINOLOGY | Age: 35
End: 2020-09-22
Payer: MEDICAID

## 2020-09-22 VITALS
BODY MASS INDEX: 28.34 KG/M2 | SYSTOLIC BLOOD PRESSURE: 100 MMHG | RESPIRATION RATE: 16 BRPM | HEART RATE: 110 BPM | HEIGHT: 64 IN | OXYGEN SATURATION: 99 % | WEIGHT: 166 LBS | DIASTOLIC BLOOD PRESSURE: 77 MMHG

## 2020-09-22 DIAGNOSIS — E11.65 TYPE 2 DIABETES MELLITUS WITH HYPERGLYCEMIA, WITH LONG-TERM CURRENT USE OF INSULIN (HCC): Primary | ICD-10-CM

## 2020-09-22 DIAGNOSIS — F84.0 ACTIVE AUTISTIC DISORDER: ICD-10-CM

## 2020-09-22 DIAGNOSIS — Z79.4 TYPE 2 DIABETES MELLITUS WITH HYPERGLYCEMIA, WITH LONG-TERM CURRENT USE OF INSULIN (HCC): Primary | ICD-10-CM

## 2020-09-22 DIAGNOSIS — E78.2 MIXED HYPERLIPIDEMIA: ICD-10-CM

## 2020-09-22 LAB
ALBUMIN SERPL-MCNC: 3.3 G/DL (ref 3.5–5)
ALBUMIN/GLOB SERPL: 0.9 {RATIO} (ref 1.1–2.2)
ALP SERPL-CCNC: 60 U/L (ref 45–117)
ALT SERPL-CCNC: 15 U/L (ref 12–78)
ANION GAP SERPL CALC-SCNC: 12 MMOL/L (ref 5–15)
AST SERPL-CCNC: 16 U/L (ref 15–37)
BILIRUB SERPL-MCNC: 0.2 MG/DL (ref 0.2–1)
BUN SERPL-MCNC: 12 MG/DL (ref 6–20)
BUN/CREAT SERPL: 13 (ref 12–20)
CALCIUM SERPL-MCNC: 9.1 MG/DL (ref 8.5–10.1)
CHLORIDE SERPL-SCNC: 100 MMOL/L (ref 97–108)
CO2 SERPL-SCNC: 22 MMOL/L (ref 21–32)
CREAT SERPL-MCNC: 0.94 MG/DL (ref 0.55–1.02)
EST. AVERAGE GLUCOSE BLD GHB EST-MCNC: 143 MG/DL
GLOBULIN SER CALC-MCNC: 3.7 G/DL (ref 2–4)
GLUCOSE SERPL-MCNC: 207 MG/DL (ref 65–100)
HBA1C MFR BLD: 6.6 % (ref 4–5.6)
POTASSIUM SERPL-SCNC: 4.2 MMOL/L (ref 3.5–5.1)
PROT SERPL-MCNC: 7 G/DL (ref 6.4–8.2)
SODIUM SERPL-SCNC: 134 MMOL/L (ref 136–145)

## 2020-09-22 PROCEDURE — 99214 OFFICE O/P EST MOD 30 MIN: CPT | Performed by: INTERNAL MEDICINE

## 2020-09-22 NOTE — PROGRESS NOTES
Reno Ordonez is a 37 y.o. female here for   Chief Complaint   Patient presents with    Diabetes       1. Have you been to the ER, urgent care clinic since your last visit? Hospitalized since your last visit? -no    2. Have you seen or consulted any other health care providers outside of the [address] [place] [address] since your last visit?   Include any pap smears or colon screening.-no

## 2020-09-22 NOTE — PROGRESS NOTES
Oh Ruiz MD             Patient Information  Date:9/22/2020  Name : Grey Shone 37 y.o.     YOB: 1985         Referred by:  Group home    Hair Carmona       Chief Complaint   Patient presents with    Diabetes       History of Present Illness: Grey Shone is a 37 y.o. female here for fu of  Type 2 Diabetes Mellitus. Type 2 diabetes mellitus: On MDI  No medication record, no glucose log from the assisted living  Assisted living was instructed to bring the log and the medication record to every visit,  Seems to be more agitated today    She did not show up for 3 appointments, discussed with the /nursing home manager the importance of follow-up in the past.  After the last visit again did not show for the appointment. Discussed with the assisted living supervisor again. She is on basal bolus regimen  No hypoglycemia, no hospitalization      Prior history  Fasting hyperglycemia and there are certain days where there are unexplained hyperglycemia   the only way to calm her is to give the  food is what I have learned and I suspect some of the staff members are giving her snacks whenever she is agitated.       She lives in a Group home due to underlying Psychiatric condition     Here with care taker , does not communicate  Monitoring frequency:3 /day         Wt Readings from Last 3 Encounters:   09/22/20 166 lb (75.3 kg)   03/02/20 173 lb (78.5 kg)   10/02/19 176 lb (79.8 kg)       BP Readings from Last 3 Encounters:   09/22/20 100/77   03/02/20 105/68   10/02/19 105/71           Past Medical History:   Diagnosis Date    Anxiety     Autism     Depression     Diabetes ([facility] [address]     Intermittent explosive disorder     Seizures ([facility] [address]      Current Outpatient Medications   Medication Sig    TRUEplus Lancets 30 gauge misc USE TO TEST BLOOD SUGAR THREE TIMES DAILY    insulin needles, disposable, (BD ULTRA-FINE ORIG PEN NEEDLE) 29 gauge x 1/2\" ndle USE TO INJECT INSULIN    lancets (PRODIGY LANCETS) 28 gauge misc Test TID Dx Code: E11.65    Blood-Glucose Meter (PRODIGY AUTOCODE METER) monitoring kit Test TID Dx Code: E11.65    OneTouch Verio Meter misc USE TO TEST BLOOD SUGAR THREE TIMES DAILY    glucose blood VI test strips (OneTouch Verio test strips) strip USE TO TEST BLOOD SUGAR THREE TIMES DAILY Dx Code: E11.65    insulin lispro (HumaLOG KwikPen Insulin) 100 unit/mL kwikpen INJECT UNDER THE SKIN: 8 units BEFORE BREAKFAST,8 units BEFORE LUNCH & 4 units BEFORE DINNER + SLIDING SCALE (NONE AT BEDTIME)    insulin glargine (Lantus Solostar U-100 Insulin) 100 unit/mL (3 mL) inpn Inject 24 units at bed time    cholecalciferol (VITAMIN D3) (1000 Units /25 mcg) tablet TAKE 1 TABLET BY MOUTH DAILY    simvastatin (ZOCOR) 10 mg tablet TAKE 1 TABLET BY MOUTH AT BEDTIME FOR CHOLESTEROL    Amitiza 24 mcg capsule TAKE 1 CAPSULE BY MOUTH TWICE DAILY WITH MEALS    cetirizine (ZYRTEC) 10 mg tablet TAKE 1 TABLET BY MOUTH DAILY FOR ALLERGIES    metFORMIN (GLUCOPHAGE) 1,000 mg tablet TAKE 1 TABLET BY MOUTH IN THE MORNING AND IN THE EVENING WITH FOOD    glucose 4 gram chewable tablet CHEW & SWALLOW 3 TABLETS BY MOUTH IF BLOOD GLUCOSE LESS THAN 70, WAIT15 MINUTES & RECHECK. REPEAT 1 TIME IF NEEDED. CALL 911 IF NO RESPONSE    polyethylene glycol (MIRALAX) 17 gram/dose powder MIX 1 CAPFUL (SEE 17 gm LINE) IN A GLASS OF WATER & DRINK ONCE DAILY AS NEEDED FOR CONSTIPATION.  medroxyPROGESTERone (DEPO-PROVERA) 150 mg/mL syrg INJECT EVERY 3 MONTHS    fluticasone propionate (FLONASE) 50 mcg/actuation nasal spray 2 Sprays by Both Nostrils route daily.     metoprolol succinate (TOPROL-XL) 25 mg XL tablet TAKE 1 TABLET BY MOUTH DAILY    fluconazole (DIFLUCAN) 150 mg tablet TAKE 1 TABLET BY MOUTH ONCE A MONTH ON 15TH    neomycin-colist-hydrocortisone-thonzonium (CORTISPORIN TC,COLY-MYCIN S) otic suspension Administer 5 Drops into each ear four (4) times daily.  benzoyl peroxide 5 % external liquid AAA every day prn    cromolyn (OPTICROM) 4 % ophthalmic solution Administer 1 Drop to both eyes four (4) times daily as needed. For itchy, watery eyes    LORazepam (ATIVAN) 1 mg tablet Take 2 mg by mouth two (2) times a day. 1 mg BID PRN    busPIRone (BUSPAR) 10 mg tablet Take 30 mg by mouth two (2) times a day.  ammonium lactate (LAC-HYDRIN) 12 % lotion Apply  to affected area two (2) times a day. rub in to affected area well    divalproex DR (DEPAKOTE) 250 mg tablet Take 250 mg by mouth daily. And 1000 mg QHS    risperiDONE (RISPERDAL) 4 mg tablet Take 4 mg by mouth two (2) times a day. No current facility-administered medications for this visit. Allergies   Allergen Reactions    Other Plant, Animal, Environmental Sneezing         Review of Systems: unable to obtain due to underlying mental status  -     Physical Examination:   Blood pressure 100/77, pulse (!) 110, resp. rate 16, height 5' 4\" (1.626 m), weight 166 lb (75.3 kg), SpO2 99 %. Estimated body mass index is 28.49 kg/m² as calculated from the following:    Height as of this encounter: 5' 4\" (1.626 m). -   Weight as of this encounter: 166 lb (75.3 kg).   - General: pleasant, no distress, good eye contact  - HEENT: no exophthalmos, no periorbital edema, EOMI  - Neck: No visible thyromegaly  - RS: Normal respiratory effort  - Musculoskeletal: no tremors  - Neurological: alert and oriented  - Psychiatric: normal mood and affect  - Skin: Normal color      -     Diabetic foot exam: Feb 2019     Left:     Vibratory sensation ND - non communicative    Filament test  ND   Pulse DP: 1+    Deformities: callus   Right:    Vibratory sensation  ND    Filament test -noncommunicative   Pulse DP: 1+   Deformities: callus          Lab Results   Component Value Date/Time    Hemoglobin A1c 7.0 (H) 02/24/2020 01:17 PM    Hemoglobin A1c 7.4 (H) 02/07/2019 04:02 PM    Hemoglobin A1c 6.9 (H) 05/13/2016 10:54 AM    Glucose 178 (H) 02/24/2020 01:17 PM    Glucose  10/10/2018 02:01 PM    Microalb/Creat ratio (ug/mg creat.) <5 02/24/2020 01:17 PM    LDL, calculated 41 02/07/2019 04:02 PM    Creatinine 0.71 02/24/2020 01:17 PM    Hemoglobin A1c, External 6.8 08/22/2019    Hemoglobin A1c, External 7.5 12/16/2017    Hemoglobin A1c, External 8.7 09/24/2015      Lab Results   Component Value Date/Time    GFR est  02/24/2020 01:17 PM    GFR est non- 02/24/2020 01:17 PM    Creatinine 0.71 02/24/2020 01:17 PM    BUN 7 02/24/2020 01:17 PM    Sodium 139 02/24/2020 01:17 PM    Potassium 4.5 02/24/2020 01:17 PM    Chloride 103 02/24/2020 01:17 PM    CO2 14 (L) 02/24/2020 01:17 PM        Assessment/Plan:     1. Type 2 diabetes mellitus with hyperglycemia, with long-term current use of insulin ([facility] [address]    2. Mixed hyperlipidemia        1. Type 2 Diabetes Mellitus   Lab Results   Component Value Date/Time    Hemoglobin A1c 7.0 (H) 02/24/2020 01:17 PM    Hemoglobin A1c (POC) 7.7 10/10/2018 02:10 PM    Hemoglobin A1c, External 6.8 08/22/2019   No changes made  Assisted living to fax the log as well as the MAR  Lantus 24 units at bedtime  Metformin  Novolog or Humalog  with meals   Stressed the compliance with the diet, low-carb diet,. Due to increase in her psychotropic medications she is also hungry and constantly asking for the food, reportedly gets agitated when she does not get something to eat. 2.  HTN :  cannot tolerate ACE    3. Hyperlipidemia : Continue statin. 4.Obesity:Body mass index is 28.49 kg/m². Diet managed by Group home      Caregiver verbalized understanding      There are no Patient Instructions on file for this visit. Thank you for allowing me to participate in the care of this patient.     Cyril Whitt MD

## 2020-09-30 ENCOUNTER — TELEPHONE (OUTPATIENT)
Dept: FAMILY MEDICINE CLINIC | Age: 35
End: 2020-09-30

## 2020-09-30 NOTE — TELEPHONE ENCOUNTER
Judith (patients direct support) called and would like a call back regarding patients test strips. She states that they are testing the patient 4x a day and the order is for 3x a day. She states they are running out to soon. She would like to know if they are supposed to be testing her 4x a day and if so can the order be written for that so the insurance will cover.  Please call her back at [phone]

## 2020-10-14 DIAGNOSIS — Z79.4 TYPE 2 DIABETES MELLITUS WITH HYPERGLYCEMIA, WITH LONG-TERM CURRENT USE OF INSULIN (HCC): ICD-10-CM

## 2020-10-14 DIAGNOSIS — E11.65 TYPE 2 DIABETES MELLITUS WITH HYPERGLYCEMIA, WITH LONG-TERM CURRENT USE OF INSULIN (HCC): ICD-10-CM

## 2020-10-14 RX ORDER — BLOOD SUGAR DIAGNOSTIC
STRIP MISCELLANEOUS
Qty: 200 STRIP | Refills: 11 | Status: SHIPPED | OUTPATIENT
Start: 2020-10-14 | End: 2021-10-07

## 2020-10-14 NOTE — TELEPHONE ENCOUNTER
Judith from [facility] called stating they are running out of supplies due to current RX stating test TID but last office visit the note states to test QID. [facility]

## 2020-10-21 DIAGNOSIS — E11.65 TYPE 2 DIABETES MELLITUS WITH HYPERGLYCEMIA, WITH LONG-TERM CURRENT USE OF INSULIN (HCC): Primary | ICD-10-CM

## 2020-10-21 DIAGNOSIS — Z79.4 TYPE 2 DIABETES MELLITUS WITH HYPERGLYCEMIA, WITH LONG-TERM CURRENT USE OF INSULIN (HCC): Primary | ICD-10-CM

## 2020-10-21 RX ORDER — BLOOD-GLUCOSE CONTROL, NORMAL
EACH MISCELLANEOUS
Qty: 200 LANCET | Refills: 11 | Status: SHIPPED | OUTPATIENT
Start: 2020-10-21 | End: 2021-08-31

## 2020-11-11 DIAGNOSIS — E11.65 UNCONTROLLED TYPE 2 DIABETES MELLITUS WITH HYPERGLYCEMIA, WITH LONG-TERM CURRENT USE OF INSULIN (HCC): ICD-10-CM

## 2020-11-11 DIAGNOSIS — Z79.4 UNCONTROLLED TYPE 2 DIABETES MELLITUS WITH HYPERGLYCEMIA, WITH LONG-TERM CURRENT USE OF INSULIN (HCC): ICD-10-CM

## 2020-11-11 RX ORDER — PEN NEEDLE, DIABETIC 29 G X1/2"
NEEDLE, DISPOSABLE MISCELLANEOUS
Qty: 100 PEN NEEDLE | Refills: 5 | Status: SHIPPED | OUTPATIENT
Start: 2020-11-11 | End: 2021-04-12

## 2020-11-23 RX ORDER — CALCIUM CARBONATE 500 MG/1
TABLET, CHEWABLE ORAL
Qty: 60 TAB | Refills: 12 | Status: SHIPPED | OUTPATIENT
Start: 2020-11-23 | End: 2021-10-25

## 2020-12-02 LAB
BUN SERPL-MCNC: 12 MG/DL (ref 6–20)
BUN/CREAT SERPL: 18 (ref 9–23)
CALCIUM SERPL-MCNC: 9.1 MG/DL (ref 8.7–10.2)
CHLORIDE SERPL-SCNC: 101 MMOL/L (ref 96–106)
CO2 SERPL-SCNC: 18 MMOL/L (ref 20–29)
CREAT SERPL-MCNC: 0.67 MG/DL (ref 0.57–1)
CREAT UR-MCNC: NORMAL MG/DL
EST. AVERAGE GLUCOSE BLD GHB EST-MCNC: 183 MG/DL
GLUCOSE SERPL-MCNC: 243 MG/DL (ref 65–99)
HBA1C MFR BLD: 8 % (ref 4.8–5.6)
LDLC SERPL DIRECT ASSAY-MCNC: 36 MG/DL (ref 0–99)
MICROALBUMIN UR-MCNC: NORMAL
POTASSIUM SERPL-SCNC: 4.4 MMOL/L (ref 3.5–5.2)
SODIUM SERPL-SCNC: 135 MMOL/L (ref 134–144)

## 2020-12-09 RX ORDER — FLUTICASONE PROPIONATE 50 MCG
SPRAY, SUSPENSION (ML) NASAL
Qty: 16 G | Refills: 12 | Status: SHIPPED | OUTPATIENT
Start: 2020-12-09 | End: 2022-06-10

## 2020-12-16 ENCOUNTER — OFFICE VISIT (OUTPATIENT)
Dept: ENDOCRINOLOGY | Age: 35
End: 2020-12-16
Payer: MEDICAID

## 2020-12-16 VITALS
HEIGHT: 64 IN | DIASTOLIC BLOOD PRESSURE: 67 MMHG | OXYGEN SATURATION: 99 % | RESPIRATION RATE: 16 BRPM | BODY MASS INDEX: 31.07 KG/M2 | HEART RATE: 75 BPM | WEIGHT: 182 LBS | SYSTOLIC BLOOD PRESSURE: 99 MMHG

## 2020-12-16 DIAGNOSIS — Z79.4 TYPE 2 DIABETES MELLITUS WITH HYPERGLYCEMIA, WITH LONG-TERM CURRENT USE OF INSULIN (HCC): Primary | ICD-10-CM

## 2020-12-16 DIAGNOSIS — E78.2 MIXED HYPERLIPIDEMIA: ICD-10-CM

## 2020-12-16 DIAGNOSIS — Z79.4 UNCONTROLLED TYPE 2 DIABETES MELLITUS WITH HYPERGLYCEMIA, WITH LONG-TERM CURRENT USE OF INSULIN (HCC): ICD-10-CM

## 2020-12-16 DIAGNOSIS — E11.65 UNCONTROLLED TYPE 2 DIABETES MELLITUS WITH HYPERGLYCEMIA, WITH LONG-TERM CURRENT USE OF INSULIN (HCC): ICD-10-CM

## 2020-12-16 DIAGNOSIS — E11.65 TYPE 2 DIABETES MELLITUS WITH HYPERGLYCEMIA, WITH LONG-TERM CURRENT USE OF INSULIN (HCC): Primary | ICD-10-CM

## 2020-12-16 DIAGNOSIS — E66.9 NON MORBID OBESITY: ICD-10-CM

## 2020-12-16 PROCEDURE — 99214 OFFICE O/P EST MOD 30 MIN: CPT | Performed by: INTERNAL MEDICINE

## 2020-12-16 PROCEDURE — 3052F HG A1C>EQUAL 8.0%<EQUAL 9.0%: CPT | Performed by: INTERNAL MEDICINE

## 2020-12-16 RX ORDER — INSULIN LISPRO 100 [IU]/ML
INJECTION, SOLUTION INTRAVENOUS; SUBCUTANEOUS
Qty: 15 ML | Refills: 11 | Status: SHIPPED | OUTPATIENT
Start: 2020-12-16 | End: 2020-12-18 | Stop reason: SDUPTHER

## 2020-12-16 NOTE — LETTER
12/19/2020 Patient: Wagner Marroquin YOB: 1985 Date of Visit: 12/16/2020 Myesha Holt NP 
[place] [address] Via Fax: [phone] Dear Myesha Holt NP, Thank you for referring Ms. Mohini Rice to [address] [place] [address] for evaluation. My notes for this consultation are attached. If you have questions, please do not hesitate to call me. I look forward to following your patient along with you. Sincerely, Suni Farr MD

## 2020-12-16 NOTE — PATIENT INSTRUCTIONS
Check blood sugars before meals and at bedtime. Maintain the log and bring it all your appointments No insulin  If glucose is less than 70 If she has low glucose which is less than 70 - give her 4 oz juice and recheck every 15 minutes until it is more than 100 If the bedtime sugars are less than 100 ,give a 15 gm snack. And recheck until it is >100 
 
1800 Kcal diet , low carb , high protein Avoid juices and sodas Lantus insulin 24 units at bed time Novolog or Humalog 10 units before breakfast , 10 units before  lunch and 4 units before dinner No humalog or Novolog at bedtime Additional Novolog or Humalog for high sugars with meals 150-200 mg   Add 2 units 201-250 mg   Add 4  units 251-300 mg   Add 6 units 301-350 mg   Add 8 units 351-400 mg   Add 10 units If sugars are more than 450 then take her to hospital

## 2020-12-16 NOTE — PROGRESS NOTES
Mckayla Lim MD             Patient Information  Date:12/16/2020  Name : Yusra Gómez 38 y.o.     YOB: 1985         Referred by:  [facility]    Sina Emerson       Chief Complaint   Patient presents with    Diabetes       History of Present Illness: Yusra Gómez is a 38 y.o. female here for fu of  Type 2 Diabetes Mellitus. Type 2 diabetes mellitus: On MDI  She is here with her   Reviewed the log, blood glucose lately has been high, mostly between lunch and dinner and there is a pattern which is noticed when one of the staff member was at work which is when the blood glucose would go from 100 before lunch to more than 300 at dinnertime. This pattern was not observed in the prior months   There is a concern that staff member might be giving patient something sweet to eat to calm her down      Prior history  She did not show up for 3 appointments, discussed with the /nursing home manager the importance of follow-up in the past.  After the last visit again did not show for the appointment. Discussed with the assisted living supervisor again. She is on basal bolus regimen  No hypoglycemia, no hospitalization      Prior history  Fasting hyperglycemia and there are certain days where there are unexplained hyperglycemia   the only way to calm her is to give the  food is what I have learned and I suspect some of the staff members are giving her snacks whenever she is agitated.       She lives in a Group home due to underlying Psychiatric condition     Here with care taker , does not communicate  Monitoring frequency:3 /day         Wt Readings from Last 3 Encounters:   12/16/20 182 lb (82.6 kg)   09/22/20 166 lb (75.3 kg)   03/02/20 173 lb (78.5 kg)       BP Readings from Last 3 Encounters:   12/16/20 99/67   09/22/20 100/77   03/02/20 105/68           Past Medical History:   Diagnosis Date    Anxiety     Autism     Depression     Diabetes (HCC)     Intermittent explosive disorder     Seizures (HCC)      Current Outpatient Medications   Medication Sig    lancets (TRUEplus Lancets) 30 gauge misc USE TO TEST BLOOD SUGAR FOUR TIMES DAILY DX CODE: E11.65    OneTouch Verio Meter misc USE TO TEST BLOOD SUGAR THREE TIMES DAILY    Blood-Glucose Meter (PRODIGY AUTOCODE METER) monitoring kit Test TID Dx Code: E11.65    fluticasone propionate (FLONASE) 50 mcg/actuation nasal spray BLOW NOSE: 2 SPRAYS IN EACH NOSTRIL DAILY FOR ALLERGY.  Calcium Antacid 200 mg calcium (500 mg) chew TAKE 1 TABLET BY MOUTH TWICE A DAY    insulin needles, disposable, (BD Ultra-Fine Orig Pen Needle) 29 gauge x 1/2\" ndle USE TO INJECT INSULIN    glucose blood VI test strips (OneTouch Verio test strips) strip USE TO TEST BLOOD SUGAR FOUR TIMES DAILY Dx Code: E11.65    insulin lispro (HumaLOG KwikPen Insulin) 100 unit/mL kwikpen INJECT UNDER THE SKIN: 8 units BEFORE BREAKFAST,8 units BEFORE LUNCH & 4 units BEFORE DINNER + SLIDING SCALE (NONE AT BEDTIME)    insulin glargine (Lantus Solostar U-100 Insulin) 100 unit/mL (3 mL) inpn Inject 24 units at bed time    cholecalciferol (VITAMIN D3) (1000 Units /25 mcg) tablet TAKE 1 TABLET BY MOUTH DAILY    simvastatin (ZOCOR) 10 mg tablet TAKE 1 TABLET BY MOUTH AT BEDTIME FOR CHOLESTEROL    Amitiza 24 mcg capsule TAKE 1 CAPSULE BY MOUTH TWICE DAILY WITH MEALS    cetirizine (ZYRTEC) 10 mg tablet TAKE 1 TABLET BY MOUTH DAILY FOR ALLERGIES    metFORMIN (GLUCOPHAGE) 1,000 mg tablet TAKE 1 TABLET BY MOUTH IN THE MORNING AND IN THE EVENING WITH FOOD    glucose 4 gram chewable tablet CHEW & SWALLOW 3 TABLETS BY MOUTH IF BLOOD GLUCOSE LESS THAN 70, WAIT15 MINUTES & RECHECK. REPEAT 1 TIME IF NEEDED. CALL 911 IF NO RESPONSE    polyethylene glycol (MIRALAX) 17 gram/dose powder MIX 1 CAPFUL (SEE 17 gm LINE) IN A GLASS OF WATER & DRINK ONCE DAILY AS NEEDED FOR CONSTIPATION.     medroxyPROGESTERone (DEPO-PROVERA) 150 mg/mL syrg INJECT EVERY 3 MONTHS    metoprolol succinate (TOPROL-XL) 25 mg XL tablet TAKE 1 TABLET BY MOUTH DAILY    fluconazole (DIFLUCAN) 150 mg tablet TAKE 1 TABLET BY MOUTH ONCE A MONTH ON 15TH    neomycin-colist-hydrocortisone-thonzonium (CORTISPORIN TC,COLY-MYCIN S) otic suspension Administer 5 Drops into each ear four (4) times daily.  benzoyl peroxide 5 % external liquid AAA every day prn    cromolyn (OPTICROM) 4 % ophthalmic solution Administer 1 Drop to both eyes four (4) times daily as needed. For itchy, watery eyes    LORazepam (ATIVAN) 1 mg tablet Take 2 mg by mouth two (2) times a day. 1 mg BID PRN    busPIRone (BUSPAR) 10 mg tablet Take 30 mg by mouth two (2) times a day.  ammonium lactate (LAC-HYDRIN) 12 % lotion Apply  to affected area two (2) times a day. rub in to affected area well    divalproex DR (DEPAKOTE) 250 mg tablet Take 250 mg by mouth daily. And 1000 mg QHS    risperiDONE (RISPERDAL) 4 mg tablet Take 4 mg by mouth two (2) times a day. No current facility-administered medications for this visit. Allergies   Allergen Reactions    Other Plant, Animal, Environmental Sneezing         Review of Systems: unable to obtain due to underlying mental status  -     Physical Examination:   Blood pressure 99/67, pulse 75, resp. rate 16, height 5' 4\" (1.626 m), weight 182 lb (82.6 kg), SpO2 99 %. Estimated body mass index is 31.24 kg/m² as calculated from the following:    Height as of this encounter: 5' 4\" (1.626 m). -   Weight as of this encounter: 182 lb (82.6 kg).   - General: pleasant, no distress, good eye contact  - HEENT: no exophthalmos, no periorbital edema, EOMI  - Neck: No thyromegaly  - CVS: S1-S2 regular  - RS: Normal respiratory effort  - Musculoskeletal: no tremors  - Neurological: alert  - Psychiatric: normal mood and affect  - Skin: Normal color      -     Diabetic foot exam: Feb 2019     Left:     Vibratory sensation ND - non communicative Filament test  ND   Pulse DP: 1+    Deformities: callus   Right:    Vibratory sensation  ND    Filament test -noncommunicative   Pulse DP: 1+   Deformities: callus          Lab Results   Component Value Date/Time    Hemoglobin A1c 8.0 (H) 12/01/2020 05:00 PM    Hemoglobin A1c 6.6 (H) 09/22/2020 03:48 PM    Hemoglobin A1c 7.0 (H) 02/24/2020 01:17 PM    Glucose 243 (H) 12/01/2020 05:00 PM    Glucose  10/10/2018 02:01 PM    Microalb/Creat ratio (ug/mg creat.) <5 02/24/2020 01:17 PM    LDL,Direct 36 12/01/2020 05:00 PM    LDL, calculated 41 02/07/2019 04:02 PM    Creatinine 0.67 12/01/2020 05:00 PM    Hemoglobin A1c, External 6.8 08/22/2019    Hemoglobin A1c, External 7.5 12/16/2017    Hemoglobin A1c, External 8.7 09/24/2015      Lab Results   Component Value Date/Time    GFR est  12/01/2020 05:00 PM    GFR est non- 12/01/2020 05:00 PM    Creatinine 0.67 12/01/2020 05:00 PM    BUN 12 12/01/2020 05:00 PM    Sodium 135 12/01/2020 05:00 PM    Potassium 4.4 12/01/2020 05:00 PM    Chloride 101 12/01/2020 05:00 PM    CO2 18 (L) 12/01/2020 05:00 PM        Assessment/Plan:     1. Type 2 diabetes mellitus with hyperglycemia, with long-term current use of insulin ([address]    2. Mixed hyperlipidemia        1. Type 2 Diabetes Mellitus   Lab Results   Component Value Date/Time    Hemoglobin A1c 8.0 (H) 12/01/2020 05:00 PM    Hemoglobin A1c (POC) 7.7 10/10/2018 02:10 PM    Hemoglobin A1c, External 6.8 08/22/2019   No changes made  Lantus 24 units at bedtime  Metformin  Novolog or Humalog  with meals  Discussed and showed the pattern with a caregiver, to retrain the staff, insulin technique as well as dietary instructions  Stressed the compliance with the diet, low-carb diet,. Due to increase in her psychotropic medications she is also hungry and constantly asking for the food, reportedly gets agitated when she does not get something to eat. 2.  HTN :  cannot tolerate ACE    3.  Hyperlipidemia : Continue statin. 4.Obesity:Body mass index is 31.24 kg/m². Diet managed by Group home      Caregiver verbalized understanding      Patient Instructions   Check blood sugars before meals and at bedtime. Maintain the log and bring it all your appointments      No insulin  If glucose is less than 70     If she has low glucose which is less than 70 - give her 4 oz juice and recheck every 15 minutes until it is more than 100     If the bedtime sugars are less than 100 ,give a 15 gm snack. And recheck until it is >100    1800 Kcal diet , low carb , high protein    Avoid juices and sodas     Lantus insulin 24 units at bed time     Novolog or Humalog 10 units before breakfast , 10 units before  lunch and 4 units before dinner   No humalog or Novolog at bedtime        Additional Novolog or Humalog for high sugars with meals     150-200 mg   Add 2 units     201-250 mg   Add 4  units     251-300 mg   Add 6 units     301-350 mg   Add 8 units     351-400 mg   Add 10 units         If sugars are more than 450 then take her to hospital        Thank you for allowing me to participate in the care of this patient.     Viki Gil MD

## 2020-12-16 NOTE — PROGRESS NOTES
Lester Godinez is a 38 y.o. female here for   Chief Complaint   Patient presents with    Diabetes       1. Have you been to the ER, urgent care clinic since your last visit? Hospitalized since your last visit? -    2. Have you seen or consulted any other health care providers outside of the [address] [place] [address] since your last visit?   Include any pap smears or colon screening.- Statement Selected

## 2020-12-18 DIAGNOSIS — E11.65 UNCONTROLLED TYPE 2 DIABETES MELLITUS WITH HYPERGLYCEMIA, WITH LONG-TERM CURRENT USE OF INSULIN (HCC): ICD-10-CM

## 2020-12-18 DIAGNOSIS — Z79.4 UNCONTROLLED TYPE 2 DIABETES MELLITUS WITH HYPERGLYCEMIA, WITH LONG-TERM CURRENT USE OF INSULIN (HCC): ICD-10-CM

## 2020-12-18 RX ORDER — INSULIN LISPRO 100 [IU]/ML
INJECTION, SOLUTION INTRAVENOUS; SUBCUTANEOUS
Qty: 15 ML | Refills: 11 | Status: SHIPPED | OUTPATIENT
Start: 2020-12-18 | End: 2022-03-03

## 2021-01-20 DIAGNOSIS — I10 ESSENTIAL HYPERTENSION: ICD-10-CM

## 2021-01-20 DIAGNOSIS — R00.0 TACHYCARDIA: ICD-10-CM

## 2021-01-20 RX ORDER — METOPROLOL SUCCINATE 25 MG/1
TABLET, EXTENDED RELEASE ORAL
Qty: 31 TAB | Refills: 12 | Status: SHIPPED | OUTPATIENT
Start: 2021-01-20 | End: 2022-01-13

## 2021-02-04 ENCOUNTER — TELEPHONE (OUTPATIENT)
Dept: ENDOCRINOLOGY | Age: 36
End: 2021-02-04

## 2021-02-04 RX ORDER — MAGNESIUM SULFATE 100 %
CRYSTALS MISCELLANEOUS
Qty: 10 TAB | Refills: 11 | Status: SHIPPED | OUTPATIENT
Start: 2021-02-04 | End: 2022-04-28 | Stop reason: SDUPTHER

## 2021-02-04 NOTE — TELEPHONE ENCOUNTER
Nurse from [facility] called and asked when should insulin be held. Explained to pt that if BG is less than 100 at bedtime, pt is to eat a 15 gm snack then recheck until over 100. Explained if insulin is less than 70, group home is to hold insulin completely.

## 2021-02-04 NOTE — TELEPHONE ENCOUNTER
----- Message from Berry Merlin sent at 2/4/2021 12:24 PM EST -----  Regarding: Dr Yvan Hyde for [facility] resident services is calling to get something in writing and directions for pt's Lantus solstar, please fax to [phone], if you have any questions please call [phone]

## 2021-02-05 NOTE — TELEPHONE ENCOUNTER
Child accompanied by mother and grandmother    CONCERNS: see chief complaint    DIET:      Milk - whole, 32-40 ounces / day      Solids - all      Water - city    STOOLS: 2 / day    SLEEP:      Naps - 1 hrs / day      Night - 10 hrs    SOCIAL HISTORY: at home with mom    VARICELLA STATUS:Vaccinated  Immunization Reactions: yes./fever    GROWTH & DEVELOPMENT:  [employer].  Runs wells:  YES  [employer].  Stacks > 6 blocks:  YES       Works on puzzle:  YES  LANGUAGE.Speech 1/2 understandable:  NO       Points to 2 pictures:  YES  PS.  Washes and dries hands:  YES       Puts on clothes:  YES       Uses spoon well:  YES                       Signed instructions faxed 02/05/2021

## 2021-03-15 DIAGNOSIS — E11.65 UNCONTROLLED TYPE 2 DIABETES MELLITUS WITH HYPERGLYCEMIA, WITH LONG-TERM CURRENT USE OF INSULIN (HCC): ICD-10-CM

## 2021-03-15 DIAGNOSIS — Z79.4 UNCONTROLLED TYPE 2 DIABETES MELLITUS WITH HYPERGLYCEMIA, WITH LONG-TERM CURRENT USE OF INSULIN (HCC): ICD-10-CM

## 2021-03-15 RX ORDER — METFORMIN HYDROCHLORIDE 1000 MG/1
TABLET ORAL
Qty: 62 TAB | Refills: 12 | Status: SHIPPED | OUTPATIENT
Start: 2021-03-15 | End: 2022-03-14

## 2021-03-25 LAB
ALBUMIN SERPL-MCNC: 3.4 G/DL (ref 3.8–4.8)
ALBUMIN/CREAT UR: 3 MG/G CREAT (ref 0–29)
ALBUMIN/GLOB SERPL: 1.3 {RATIO} (ref 1.2–2.2)
ALP SERPL-CCNC: 64 IU/L (ref 39–117)
ALT SERPL-CCNC: 11 IU/L (ref 0–32)
AST SERPL-CCNC: 15 IU/L (ref 0–40)
BILIRUB SERPL-MCNC: <0.2 MG/DL (ref 0–1.2)
BUN SERPL-MCNC: 12 MG/DL (ref 6–20)
BUN/CREAT SERPL: 18 (ref 9–23)
CALCIUM SERPL-MCNC: 9.4 MG/DL (ref 8.7–10.2)
CHLORIDE SERPL-SCNC: 104 MMOL/L (ref 96–106)
CHOLEST SERPL-MCNC: 95 MG/DL (ref 100–199)
CO2 SERPL-SCNC: 22 MMOL/L (ref 20–29)
CREAT SERPL-MCNC: 0.68 MG/DL (ref 0.57–1)
CREAT UR-MCNC: 164.5 MG/DL
EST. AVERAGE GLUCOSE BLD GHB EST-MCNC: 160 MG/DL
GLOBULIN SER CALC-MCNC: 2.6 G/DL (ref 1.5–4.5)
GLUCOSE SERPL-MCNC: 177 MG/DL (ref 65–99)
HBA1C MFR BLD: 7.2 % (ref 4.8–5.6)
HDLC SERPL-MCNC: 47 MG/DL
IMP & REVIEW OF LAB RESULTS: NORMAL
LDLC SERPL CALC-MCNC: 32 MG/DL (ref 0–99)
Lab: NORMAL
MICROALBUMIN UR-MCNC: 5.4 UG/ML
POTASSIUM SERPL-SCNC: 4.2 MMOL/L (ref 3.5–5.2)
PROT SERPL-MCNC: 6 G/DL (ref 6–8.5)
SODIUM SERPL-SCNC: 141 MMOL/L (ref 134–144)
TRIGL SERPL-MCNC: 79 MG/DL (ref 0–149)
VLDLC SERPL CALC-MCNC: 16 MG/DL (ref 5–40)

## 2021-03-31 ENCOUNTER — OFFICE VISIT (OUTPATIENT)
Dept: ENDOCRINOLOGY | Age: 36
End: 2021-03-31
Payer: MEDICAID

## 2021-03-31 VITALS
BODY MASS INDEX: 30.22 KG/M2 | HEART RATE: 110 BPM | SYSTOLIC BLOOD PRESSURE: 77 MMHG | HEIGHT: 64 IN | DIASTOLIC BLOOD PRESSURE: 56 MMHG | RESPIRATION RATE: 16 BRPM | WEIGHT: 177 LBS | OXYGEN SATURATION: 100 %

## 2021-03-31 DIAGNOSIS — Z79.4 TYPE 2 DIABETES MELLITUS WITH HYPERGLYCEMIA, WITH LONG-TERM CURRENT USE OF INSULIN (HCC): Primary | ICD-10-CM

## 2021-03-31 DIAGNOSIS — E11.65 TYPE 2 DIABETES MELLITUS WITH HYPERGLYCEMIA, WITH LONG-TERM CURRENT USE OF INSULIN (HCC): Primary | ICD-10-CM

## 2021-03-31 DIAGNOSIS — E78.2 MIXED HYPERLIPIDEMIA: ICD-10-CM

## 2021-03-31 PROCEDURE — 99214 OFFICE O/P EST MOD 30 MIN: CPT | Performed by: INTERNAL MEDICINE

## 2021-03-31 PROCEDURE — 3051F HG A1C>EQUAL 7.0%<8.0%: CPT | Performed by: INTERNAL MEDICINE

## 2021-03-31 NOTE — PROGRESS NOTES
Bennie Rossi is a 38 y.o. female here for   Chief Complaint   Patient presents with    Diabetes       1. Have you been to the ER, urgent care clinic since your last visit? Hospitalized since your last visit? -no    2. Have you seen or consulted any other health care providers outside of the [address] [place] [address] since your last visit?   Include any pap smears or colon screening.-no

## 2021-03-31 NOTE — PROGRESS NOTES
Usman Laughlin MD             Patient Information  Date:3/31/2021  Name : Essence Huston 38 y.o.     YOB: 1985         Referred by:  [facility]       Chief Complaint   Patient presents with    Diabetes       History of Present Illness: Essence Huston is a 38 y.o. female here for fu of  Type 2 Diabetes Mellitus. Type 2 diabetes mellitus: On MDI  She is here with her caregiver    On insulin, did not bring the [facility]  Reviewed the log    Prior history    Reviewed the log, blood glucose lately has been high, mostly between lunch and dinner and there is a pattern which is noticed when one of the staff member was at work which is when the blood glucose would go from 100 before lunch to more than 300 at dinnertime. This pattern was not observed in the prior months   There is a concern that staff member might be giving patient something sweet to eat to calm her down      Prior history  She did not show up for 3 appointments, discussed with the /nursing home manager the importance of follow-up in the past.  After the last visit again did not show for the appointment. Discussed with the assisted living supervisor again. She is on basal bolus regimen  No hypoglycemia, no hospitalization      Prior history  Fasting hyperglycemia and there are certain days where there are unexplained hyperglycemia   the only way to calm her is to give the  food is what I have learned and I suspect some of the staff members are giving her snacks whenever she is agitated.       She lives in a Group home due to underlying Psychiatric condition     Here with care taker , does not communicate  Monitoring frequency:3 /day         Wt Readings from Last 3 Encounters:   03/31/21 177 lb (80.3 kg)   12/16/20 182 lb (82.6 kg)   09/22/20 166 lb (75.3 kg)       BP Readings from Last 3 Encounters:   03/31/21 (!) 77/56   12/16/20 99/67 09/22/20 100/77           Past Medical History:   Diagnosis Date    Anxiety     Autism     Depression     Diabetes ([facility]     Intermittent explosive disorder     Seizures (HCC)      Current Outpatient Medications   Medication Sig    insulin lispro (HumaLOG KwikPen Insulin) 100 unit/mL kwikpen INJECT UNDER THE SKIN:10 units BEFORE BREAKFAST,10 units BEFORE LUNCH & 4 units BEFORE DINNER + SLIDING SCALE (NONE AT BEDTIME)    insulin glargine (Lantus Solostar U-100 Insulin) 100 unit/mL (3 mL) inpn Inject 24 units at bed time    metFORMIN (GLUCOPHAGE) 1,000 mg tablet TAKE 1 TABLET BY MOUTH IN THE MORNING AND IN THE EVENING WITH FOOD    glucose 4 gram chewable tablet CHEW & SWALLOW 3 TABLETS BY MOUTH IF BLOOD GLUCOSE LESS THAN 70, WAIT15 MINUTES & RECHECK. REPEAT 1 TIME IF NEEDED. CALL 911 IF NO RESPONSE    metoprolol succinate (TOPROL-XL) 25 mg XL tablet TAKE 1 TABLET BY MOUTH DAILY    fluticasone propionate (FLONASE) 50 mcg/actuation nasal spray BLOW NOSE: 2 SPRAYS IN EACH NOSTRIL DAILY FOR ALLERGY.     Calcium Antacid 200 mg calcium (500 mg) chew TAKE 1 TABLET BY MOUTH TWICE A DAY    insulin needles, disposable, (BD Ultra-Fine Orig Pen Needle) 29 gauge x 1/2\" ndle USE TO INJECT INSULIN    lancets (TRUEplus Lancets) 30 gauge misc USE TO TEST BLOOD SUGAR FOUR TIMES DAILY DX CODE: E11.65    glucose blood VI test strips (OneTouch Verio test strips) strip USE TO TEST BLOOD SUGAR FOUR TIMES DAILY Dx Code: E11.65    OneTouch Verio Meter misc USE TO TEST BLOOD SUGAR THREE TIMES DAILY    cholecalciferol (VITAMIN D3) (1000 Units /25 mcg) tablet TAKE 1 TABLET BY MOUTH DAILY    simvastatin (ZOCOR) 10 mg tablet TAKE 1 TABLET BY MOUTH AT BEDTIME FOR CHOLESTEROL    Amitiza 24 mcg capsule TAKE 1 CAPSULE BY MOUTH TWICE DAILY WITH MEALS    cetirizine (ZYRTEC) 10 mg tablet TAKE 1 TABLET BY MOUTH DAILY FOR ALLERGIES    polyethylene glycol (MIRALAX) 17 gram/dose powder MIX 1 CAPFUL (SEE 17 gm LINE) IN A GLASS OF WATER & DRINK ONCE DAILY AS NEEDED FOR CONSTIPATION.  medroxyPROGESTERone (DEPO-PROVERA) 150 mg/mL syrg INJECT EVERY 3 MONTHS    Blood-Glucose Meter (PRODIGY AUTOCODE METER) monitoring kit Test TID Dx Code: E11.65    fluconazole (DIFLUCAN) 150 mg tablet TAKE 1 TABLET BY MOUTH ONCE A MONTH ON 15TH    neomycin-colist-hydrocortisone-thonzonium (CORTISPORIN TC,COLY-MYCIN S) otic suspension Administer 5 Drops into each ear four (4) times daily.  benzoyl peroxide 5 % external liquid AAA every day prn    cromolyn (OPTICROM) 4 % ophthalmic solution Administer 1 Drop to both eyes four (4) times daily as needed. For itchy, watery eyes    LORazepam (ATIVAN) 1 mg tablet Take 2 mg by mouth two (2) times a day. 1 mg BID PRN    busPIRone (BUSPAR) 10 mg tablet Take 30 mg by mouth two (2) times a day.  ammonium lactate (LAC-HYDRIN) 12 % lotion Apply  to affected area two (2) times a day. rub in to affected area well    divalproex DR (DEPAKOTE) 250 mg tablet Take 250 mg by mouth daily. And 1000 mg QHS    risperiDONE (RISPERDAL) 4 mg tablet Take 4 mg by mouth two (2) times a day. No current facility-administered medications for this visit. Allergies   Allergen Reactions    Other Plant, Animal, Environmental Sneezing         Review of Systems: unable to obtain due to underlying mental status  -     Physical Examination:   Blood pressure (!) 77/56, pulse (!) 110, resp. rate 16, height 5' 4\" (1.626 m), weight 177 lb (80.3 kg), SpO2 100 %. Estimated body mass index is 30.38 kg/m² as calculated from the following:    Height as of this encounter: 5' 4\" (1.626 m). -   Weight as of this encounter: 177 lb (80.3 kg).   - General: pleasant, no distress, good eye contact  - HEENT: no exophthalmos, no periorbital edema, EOMI  - Neck: No thyromegaly  - CVS: S1-S2 regular  - RS: Normal respiratory effort  - Musculoskeletal: no tremors  - Neurological: alert   - Psychiatric: normal mood and affect  - Skin: Normal color      -   History of callus          Lab Results   Component Value Date/Time    Hemoglobin A1c 7.2 (H) 03/24/2021 03:20 PM    Hemoglobin A1c 8.0 (H) 12/01/2020 05:00 PM    Hemoglobin A1c 6.6 (H) 09/22/2020 03:48 PM    Glucose 177 (H) 03/24/2021 03:20 PM    Glucose  10/10/2018 02:01 PM    Microalb/Creat ratio (ug/mg creat.) 3 03/24/2021 03:20 PM    LDL,Direct 36 12/01/2020 05:00 PM    LDL, calculated 32 03/24/2021 03:20 PM    LDL, calculated 41 02/07/2019 04:02 PM    Creatinine 0.68 03/24/2021 03:20 PM    Hemoglobin A1c, External 6.8 08/22/2019    Hemoglobin A1c, External 7.5 12/16/2017    Hemoglobin A1c, External 8.7 09/24/2015      Lab Results   Component Value Date/Time    GFR est  03/24/2021 03:20 PM    GFR est non- 03/24/2021 03:20 PM    Creatinine 0.68 03/24/2021 03:20 PM    BUN 12 03/24/2021 03:20 PM    Sodium 141 03/24/2021 03:20 PM    Potassium 4.2 03/24/2021 03:20 PM    Chloride 104 03/24/2021 03:20 PM    CO2 22 03/24/2021 03:20 PM        Assessment/Plan:     1. Type 2 diabetes mellitus with hyperglycemia, with long-term current use of insulin ([address]    2. Mixed hyperlipidemia        1. Type 2 Diabetes Mellitus   Lab Results   Component Value Date/Time    Hemoglobin A1c 7.2 (H) 03/24/2021 03:20 PM    Hemoglobin A1c (POC) 7.7 10/10/2018 02:10 PM    Hemoglobin A1c, External 6.8 08/22/2019   No changes made  Lantus 24 units at bedtime  Metformin  Novolog or Humalog  with meals    Stressed the compliance with the diet, low-carb diet,. Due to increase in her psychotropic medications she is also hungry and constantly asking for the food, reportedly gets agitated when she does not get something to eat. 2.  HTN :  cannot tolerate ACE    3. Hyperlipidemia : Continue statin. 4.Obesity:Body mass index is 30.38 kg/m². Diet managed by Group home      Caregiver verbalized understanding      There are no Patient Instructions on file for this visit.       Thank you for allowing me to participate in the care of this patient.     Radha Tello MD

## 2021-04-12 DIAGNOSIS — Z79.4 UNCONTROLLED TYPE 2 DIABETES MELLITUS WITH HYPERGLYCEMIA, WITH LONG-TERM CURRENT USE OF INSULIN (HCC): ICD-10-CM

## 2021-04-12 DIAGNOSIS — E11.65 UNCONTROLLED TYPE 2 DIABETES MELLITUS WITH HYPERGLYCEMIA, WITH LONG-TERM CURRENT USE OF INSULIN (HCC): ICD-10-CM

## 2021-04-12 RX ORDER — LUBIPROSTONE 24 UG/1
CAPSULE, GELATIN COATED ORAL
Qty: 60 CAP | Refills: 12 | Status: SHIPPED | OUTPATIENT
Start: 2021-04-12 | End: 2022-04-11

## 2021-04-12 RX ORDER — PEN NEEDLE, DIABETIC 29 G X1/2"
NEEDLE, DISPOSABLE MISCELLANEOUS
Qty: 100 PEN NEEDLE | Refills: 5 | Status: SHIPPED | OUTPATIENT
Start: 2021-04-12 | End: 2021-10-13

## 2021-04-12 RX ORDER — CETIRIZINE HCL 10 MG
TABLET ORAL
Qty: 30 TAB | Refills: 12 | Status: SHIPPED | OUTPATIENT
Start: 2021-04-12 | End: 2022-04-11

## 2021-04-12 RX ORDER — SIMVASTATIN 10 MG/1
TABLET, FILM COATED ORAL
Qty: 30 TAB | Refills: 12 | Status: SHIPPED | OUTPATIENT
Start: 2021-04-12 | End: 2022-04-11

## 2021-05-11 ENCOUNTER — OFFICE VISIT (OUTPATIENT)
Dept: FAMILY MEDICINE CLINIC | Age: 36
End: 2021-05-11
Payer: MEDICAID

## 2021-05-11 VITALS
BODY MASS INDEX: 30.22 KG/M2 | HEIGHT: 64 IN | DIASTOLIC BLOOD PRESSURE: 64 MMHG | HEART RATE: 102 BPM | SYSTOLIC BLOOD PRESSURE: 102 MMHG | OXYGEN SATURATION: 96 % | WEIGHT: 177 LBS

## 2021-05-11 DIAGNOSIS — E11.65 TYPE 2 DIABETES MELLITUS WITH HYPERGLYCEMIA, WITH LONG-TERM CURRENT USE OF INSULIN (HCC): ICD-10-CM

## 2021-05-11 DIAGNOSIS — F84.0 ACTIVE AUTISTIC DISORDER: ICD-10-CM

## 2021-05-11 DIAGNOSIS — N92.6 IRREGULAR MENSES: ICD-10-CM

## 2021-05-11 DIAGNOSIS — I10 ESSENTIAL HYPERTENSION: ICD-10-CM

## 2021-05-11 DIAGNOSIS — Z79.4 TYPE 2 DIABETES MELLITUS WITH HYPERGLYCEMIA, WITH LONG-TERM CURRENT USE OF INSULIN (HCC): ICD-10-CM

## 2021-05-11 DIAGNOSIS — Z00.00 ROUTINE GENERAL MEDICAL EXAMINATION AT A HEALTH CARE FACILITY: Primary | ICD-10-CM

## 2021-05-11 DIAGNOSIS — E78.2 MIXED HYPERLIPIDEMIA: ICD-10-CM

## 2021-05-11 DIAGNOSIS — Z30.42 DEPO-PROVERA CONTRACEPTIVE STATUS: ICD-10-CM

## 2021-05-11 PROCEDURE — 96372 THER/PROPH/DIAG INJ SC/IM: CPT | Performed by: FAMILY MEDICINE

## 2021-05-11 PROCEDURE — 99213 OFFICE O/P EST LOW 20 MIN: CPT | Performed by: FAMILY MEDICINE

## 2021-05-11 PROCEDURE — 3051F HG A1C>EQUAL 7.0%<8.0%: CPT | Performed by: FAMILY MEDICINE

## 2021-05-11 PROCEDURE — G0439 PPPS, SUBSEQ VISIT: HCPCS | Performed by: FAMILY MEDICINE

## 2021-05-11 RX ORDER — MEDROXYPROGESTERONE ACETATE 150 MG/ML
150 INJECTION, SUSPENSION INTRAMUSCULAR ONCE
Status: COMPLETED | OUTPATIENT
Start: 2021-05-11 | End: 2021-05-11

## 2021-05-11 RX ORDER — DIVALPROEX SODIUM 500 MG/1
1000 TABLET, DELAYED RELEASE ORAL
COMMUNITY
End: 2022-08-30

## 2021-05-11 RX ADMIN — MEDROXYPROGESTERONE ACETATE 150 MG: 150 INJECTION, SUSPENSION INTRAMUSCULAR at 16:26

## 2021-05-11 NOTE — PROGRESS NOTES
Lary Batres is a 38 y.o. female , id x 2(name and ). Reviewed record, history, and  medications. Chief Complaint   Patient presents with    Follow-up    Depo       Vitals:    21 1543   BP: 102/64   Pulse: (!) 102   SpO2: 96%   Weight: 177 lb (80.3 kg)   Height: 5' 4\" (1.626 m)       Coordination of Care Questionnaire:   1) Have you been to an emergency room, urgent care, or hospitalized since your last visit?   no       2. Have seen or consulted any other health care provider since your last visit? YES      3 most recent PHQ Screens 2021   Little interest or pleasure in doing things Not at all   Feeling down, depressed, irritable, or hopeless Not at all   Total Score PHQ 2 0       Patient is accompanied by adult caretaker I have received verbal consent from Lary Batres to discuss any/all medical information while they are present in the room. Medicare Wellness Exam:    Chief Complaint   Patient presents with    Follow-up    Depo     she is a 38y.o. year old female who presents for evaluation for their Medicare Wellness Visit. Beaulah Bryn is completed and assessed=yes  Depression Screen is completed and assessed=yes  Medication list reviewed and adjusted for accuracy=yes  Immunizations reviewed and updated=yes  Health/Preventative Screenings reviewed and updated=yes  ADL Functions reviewed=yes    Patient Active Problem List    Diagnosis    Type 2 diabetes mellitus with hyperglycemia, with long-term current use of insulin ([facility]    Non morbid obesity    BMI 34.0-34.9,adult    Mixed hyperlipidemia    Obesity    Active autistic disorder    Hypovitaminosis D       Reviewed PmHx, RxHx, FmHx, SocHx, AllgHx and updated and dated in the chart.     Review of Systems - negative except as listed above in the HPI    Objective:     Vitals:    21 1543   BP: 102/64   Pulse: (!) 102   SpO2: 96%   Weight: 177 lb (80.3 kg)   Height: 5' 4\" (1.626 m)     Physical Examination: General appearance - alert, well appearing, and in no distress    Assessment/ Plan:   Diagnoses and all orders for this visit:    1. Routine general medical examination at a health care facility  Patient's group home patient, Depo shot given in office today    2. Type 2 diabetes mellitus with hyperglycemia, with long-term current use of insulin ([facility])  Lab Results   Component Value Date/Time    Hemoglobin A1c 7.2 (H) 03/24/2021 03:20 PM    Hemoglobin A1c (POC) 7.7 10/10/2018 02:10 PM    Hemoglobin A1c, External 6.8 08/22/2019     A1c is better from last office visit continue with current plan to follow-up in 3 months  3. Mixed hyperlipidemia  See prior labs     4. Active autistic disorder  Stable  5. Essential hypertension  At goal       -Pain evaluation performed in office  -Cognitive Screen performed in office  -Depression Screen, Fall risks (by up and go test)  and ADL functionality were addressed  -Medication list updated and reviewed for any changes   -A comprehensive review of medical issues and a plan was formulated  -End of life planning was addressed with pt   -Health Screenings for preventions were addressed and a plan was formulated  -Shingles Vaccine was recommended  -Discussed with patient cancer risk factors and appropriate screenings for age  -Patient evaluated for colonoscopy and referred if needed per screeing criteria  -Labs from previous visits were discussed with patient   -Discussed with patient diet and exercise and formulated a plan as needed  -An Advanced care plan was developed with the patient.  -Alcohol screening performed and was negative    -    I have discussed the diagnosis with the patient and the intended plan as seen in the above orders. The patient understands and agrees with the plan. The patient has received an after-visit summary and questions were answered concerning future plans.      Medication Side Effects and Warnings were discussed with patien  Patient Labs were reviewed and or requested  Patient Past Records were reviewed and or requested    There are no Patient Instructions on file for this visit.       Anna Jimenez M.D.

## 2021-06-08 ENCOUNTER — TELEPHONE (OUTPATIENT)
Dept: FAMILY MEDICINE CLINIC | Age: 36
End: 2021-06-08

## 2021-06-08 NOTE — TELEPHONE ENCOUNTER
Spoke to Judith regarding diflucan bid. She states looking back at the records, patient has been on this since 2017, prescribed by Dr. Aneesh Vasquez, however, there is no diagnosis attached to it. Does Dr. Aneesh Vasquez still want her on this , If so, new rx needed to be sent to [facility].

## 2021-06-11 DIAGNOSIS — E11.65 UNCONTROLLED TYPE 2 DIABETES MELLITUS WITH HYPERGLYCEMIA, WITH LONG-TERM CURRENT USE OF INSULIN (HCC): ICD-10-CM

## 2021-06-11 DIAGNOSIS — Z79.4 UNCONTROLLED TYPE 2 DIABETES MELLITUS WITH HYPERGLYCEMIA, WITH LONG-TERM CURRENT USE OF INSULIN (HCC): ICD-10-CM

## 2021-06-11 RX ORDER — INSULIN GLARGINE 100 [IU]/ML
INJECTION, SOLUTION SUBCUTANEOUS
Qty: 9 ML | Refills: 12 | Status: SHIPPED | OUTPATIENT
Start: 2021-06-11 | End: 2022-06-23

## 2021-06-14 RX ORDER — MELATONIN
Qty: 30 TABLET | Refills: 11 | Status: SHIPPED | OUTPATIENT
Start: 2021-06-14 | End: 2022-06-14

## 2021-07-22 LAB
BUN SERPL-MCNC: 10 MG/DL (ref 6–20)
BUN/CREAT SERPL: 15 (ref 9–23)
CALCIUM SERPL-MCNC: 9.1 MG/DL (ref 8.7–10.2)
CHLORIDE SERPL-SCNC: 105 MMOL/L (ref 96–106)
CO2 SERPL-SCNC: 19 MMOL/L (ref 20–29)
CREAT SERPL-MCNC: 0.66 MG/DL (ref 0.57–1)
EST. AVERAGE GLUCOSE BLD GHB EST-MCNC: 154 MG/DL
GLUCOSE SERPL-MCNC: 153 MG/DL (ref 65–99)
HBA1C MFR BLD: 7 % (ref 4.8–5.6)
POTASSIUM SERPL-SCNC: 4.6 MMOL/L (ref 3.5–5.2)
SODIUM SERPL-SCNC: 137 MMOL/L (ref 134–144)

## 2021-08-09 ENCOUNTER — OFFICE VISIT (OUTPATIENT)
Dept: FAMILY MEDICINE CLINIC | Age: 36
End: 2021-08-09

## 2021-08-09 VITALS — RESPIRATION RATE: 18 BRPM | TEMPERATURE: 97.6 F | HEART RATE: 94 BPM | OXYGEN SATURATION: 98 %

## 2021-08-09 DIAGNOSIS — Z30.42 DEPO-PROVERA CONTRACEPTIVE STATUS: Primary | ICD-10-CM

## 2021-08-09 DIAGNOSIS — R56.9 FOCAL SEIZURES (HCC): ICD-10-CM

## 2021-08-09 NOTE — PROGRESS NOTES
Patient here for depo inj only. 150 mcg given in left arm today. Exp:6/2022  HGZ:[id number]  Next injection due Oct 25- Nov 6, 2021. Scheduled for 10/27/2021 at 3:15pm    1. Have you been to the ER, urgent care clinic since your last visit? Hospitalized since your last visit? No    2. Have you seen or consulted any other health care providers outside of the [address] [place] [address] since your last visit? Include any pap smears or colon screening.  No

## 2021-08-10 ENCOUNTER — TELEPHONE (OUTPATIENT)
Dept: ENDOCRINOLOGY | Age: 36
End: 2021-08-10

## 2021-08-10 DIAGNOSIS — Z79.4 UNCONTROLLED TYPE 2 DIABETES MELLITUS WITH HYPERGLYCEMIA, WITH LONG-TERM CURRENT USE OF INSULIN (HCC): Primary | ICD-10-CM

## 2021-08-10 DIAGNOSIS — E11.65 UNCONTROLLED TYPE 2 DIABETES MELLITUS WITH HYPERGLYCEMIA, WITH LONG-TERM CURRENT USE OF INSULIN (HCC): Primary | ICD-10-CM

## 2021-08-10 NOTE — TELEPHONE ENCOUNTER
Judith asked that new lab orders be mailed to pt for November appt.  Order placed for pt,  per Verbal Order with read back from Dr Angeles Sic 8/10/2021

## 2021-08-10 NOTE — TELEPHONE ENCOUNTER
----- Message from Julianne Cloud sent at 8/10/2021  8:17 AM EDT -----  Regarding: Dr. Chiara Goodman with [facility], is requesting a call to confirm if pt will need new labs or can she use the labs from 07/21/2021 for the upcoming appt on 11/17/2021. Judith's best contact number [phone].

## 2021-08-30 DIAGNOSIS — Z79.4 TYPE 2 DIABETES MELLITUS WITH HYPERGLYCEMIA, WITH LONG-TERM CURRENT USE OF INSULIN (HCC): ICD-10-CM

## 2021-08-30 DIAGNOSIS — E11.65 TYPE 2 DIABETES MELLITUS WITH HYPERGLYCEMIA, WITH LONG-TERM CURRENT USE OF INSULIN (HCC): ICD-10-CM

## 2021-08-31 RX ORDER — BLOOD-GLUCOSE CONTROL, NORMAL
EACH MISCELLANEOUS
Qty: 100 LANCET | Refills: 11 | Status: SHIPPED | OUTPATIENT
Start: 2021-08-31 | End: 2022-05-23

## 2021-10-07 DIAGNOSIS — Z79.4 TYPE 2 DIABETES MELLITUS WITH HYPERGLYCEMIA, WITH LONG-TERM CURRENT USE OF INSULIN (HCC): ICD-10-CM

## 2021-10-07 DIAGNOSIS — E11.65 TYPE 2 DIABETES MELLITUS WITH HYPERGLYCEMIA, WITH LONG-TERM CURRENT USE OF INSULIN (HCC): ICD-10-CM

## 2021-10-07 RX ORDER — BLOOD SUGAR DIAGNOSTIC
STRIP MISCELLANEOUS
Qty: 100 STRIP | Refills: 12 | Status: SHIPPED | OUTPATIENT
Start: 2021-10-07 | End: 2022-07-27

## 2021-10-13 DIAGNOSIS — E11.65 UNCONTROLLED TYPE 2 DIABETES MELLITUS WITH HYPERGLYCEMIA, WITH LONG-TERM CURRENT USE OF INSULIN (HCC): ICD-10-CM

## 2021-10-13 DIAGNOSIS — Z79.4 UNCONTROLLED TYPE 2 DIABETES MELLITUS WITH HYPERGLYCEMIA, WITH LONG-TERM CURRENT USE OF INSULIN (HCC): ICD-10-CM

## 2021-10-13 RX ORDER — PEN NEEDLE, DIABETIC 29 G X1/2"
NEEDLE, DISPOSABLE MISCELLANEOUS
Qty: 100 PEN NEEDLE | Refills: 11 | Status: SHIPPED | OUTPATIENT
Start: 2021-10-13 | End: 2022-08-10

## 2021-10-25 RX ORDER — CALCIUM CARBONATE 500 MG/1
TABLET, CHEWABLE ORAL
Qty: 62 TABLET | Refills: 12 | Status: SHIPPED | OUTPATIENT
Start: 2021-10-25 | End: 2022-10-12

## 2021-11-03 ENCOUNTER — OFFICE VISIT (OUTPATIENT)
Dept: FAMILY MEDICINE CLINIC | Age: 36
End: 2021-11-03
Payer: MEDICAID

## 2021-11-03 VITALS — WEIGHT: 190 LBS | BODY MASS INDEX: 32.44 KG/M2 | HEIGHT: 64 IN

## 2021-11-03 DIAGNOSIS — Z30.42 DEPO-PROVERA CONTRACEPTIVE STATUS: Primary | ICD-10-CM

## 2021-11-03 PROCEDURE — 96372 THER/PROPH/DIAG INJ SC/IM: CPT | Performed by: STUDENT IN AN ORGANIZED HEALTH CARE EDUCATION/TRAINING PROGRAM

## 2021-11-03 RX ORDER — MEDROXYPROGESTERONE ACETATE 150 MG/ML
150 INJECTION, SUSPENSION INTRAMUSCULAR ONCE
Status: COMPLETED | OUTPATIENT
Start: 2021-11-03 | End: 2021-11-03

## 2021-11-03 RX ADMIN — MEDROXYPROGESTERONE ACETATE 150 MG: 150 INJECTION, SUSPENSION INTRAMUSCULAR at 16:50

## 2021-11-03 NOTE — PROGRESS NOTES
Saadia Ni is a 43 y.o. female , id x 2(name and ). Reviewed record, history, and  medications. Chief Complaint   Patient presents with    Depo       Vitals:    21 1640   Weight: 190 lb (86.2 kg)   Height: 5' 4\" (1.626 m)         Patient is accompanied by nursing attendant I have received verbal consent from Saadia Ni to discuss any/all medical information while they are present in the room. Date last pap: n/a  Last Depo-Provera: 2021. Side Effects if any: n/a. Serum HCG indicated? n/a. Depo-Provera 150 mg IM given by: Kylie Bowden LPN. Next appointment due  - . After obtaining consent, and per orders of Dr. Grady Meraz, injection of Depo [id number] mg/mL  given by Kylie Bowden LPN in patient's (R) delt. Patient instructed to remain in clinic for 20 minutes afterwards, and to report any adverse reaction to me immediately.

## 2021-11-04 LAB
BUN SERPL-MCNC: 8 MG/DL (ref 6–20)
BUN/CREAT SERPL: 12 (ref 9–23)
CALCIUM SERPL-MCNC: 9.1 MG/DL (ref 8.7–10.2)
CHLORIDE SERPL-SCNC: 98 MMOL/L (ref 96–106)
CO2 SERPL-SCNC: 19 MMOL/L (ref 20–29)
CREAT SERPL-MCNC: 0.67 MG/DL (ref 0.57–1)
EST. AVERAGE GLUCOSE BLD GHB EST-MCNC: 157 MG/DL
GLUCOSE SERPL-MCNC: 171 MG/DL (ref 65–99)
HBA1C MFR BLD: 7.1 % (ref 4.8–5.6)
POTASSIUM SERPL-SCNC: 4.1 MMOL/L (ref 3.5–5.2)
SODIUM SERPL-SCNC: 137 MMOL/L (ref 134–144)

## 2021-11-17 ENCOUNTER — OFFICE VISIT (OUTPATIENT)
Dept: ENDOCRINOLOGY | Age: 36
End: 2021-11-17
Payer: MEDICAID

## 2021-11-17 VITALS
HEIGHT: 64 IN | RESPIRATION RATE: 16 BRPM | TEMPERATURE: 97.8 F | SYSTOLIC BLOOD PRESSURE: 120 MMHG | BODY MASS INDEX: 31.92 KG/M2 | DIASTOLIC BLOOD PRESSURE: 81 MMHG | OXYGEN SATURATION: 100 % | HEART RATE: 90 BPM | WEIGHT: 187 LBS

## 2021-11-17 DIAGNOSIS — E11.65 TYPE 2 DIABETES MELLITUS WITH HYPERGLYCEMIA, WITH LONG-TERM CURRENT USE OF INSULIN (HCC): Primary | ICD-10-CM

## 2021-11-17 DIAGNOSIS — E78.2 MIXED HYPERLIPIDEMIA: ICD-10-CM

## 2021-11-17 DIAGNOSIS — Z79.4 TYPE 2 DIABETES MELLITUS WITH HYPERGLYCEMIA, WITH LONG-TERM CURRENT USE OF INSULIN (HCC): Primary | ICD-10-CM

## 2021-11-17 PROCEDURE — 3051F HG A1C>EQUAL 7.0%<8.0%: CPT | Performed by: INTERNAL MEDICINE

## 2021-11-17 PROCEDURE — 99214 OFFICE O/P EST MOD 30 MIN: CPT | Performed by: INTERNAL MEDICINE

## 2021-11-17 RX ORDER — FLUCONAZOLE 150 MG/1
150 TABLET ORAL
COMMUNITY
End: 2022-06-10 | Stop reason: ALTCHOICE

## 2021-11-17 NOTE — PROGRESS NOTES
Annika Rosales is a 43 y.o. female here for   Chief Complaint   Patient presents with    Diabetes       1. Have you been to the ER, urgent care clinic since your last visit? Hospitalized since your last visit? -no    2. Have you seen or consulted any other health care providers outside of the [address] [place] [address] since your last visit?   Include any pap smears or colon screening.-Dr. Elda Burk foot doc and psych    Order placed for pt per verbal order with read back from Dr. Perales Shoulder 11/17/21

## 2021-11-17 NOTE — PROGRESS NOTES
Claudia Hansen MD             Patient Information  Date:11/17/2021  Name : Lisbet Altman 43 y.o.     YOB: 1985         Referred by:  [facility] [place]    Dean Mckeon       Chief Complaint   Patient presents with    Diabetes       History of Present Illness: Lisbet Altman is a 43 y.o. female here for fu of  Type 2 Diabetes Mellitus. Type 2 diabetes mellitus: On MDI  She is here with her caregiver    On insulin, reviewed MAR  Reviewed the log, blood glucose at 300s after coming back from the day support, which is due to sweet tea and sandwich at [employer]    Prior history    Reviewed the log, blood glucose lately has been high, mostly between lunch and dinner and there is a pattern which is noticed when one of the staff member was at work which is when the blood glucose would go from 100 before lunch to more than 300 at dinnertime. This pattern was not observed in the prior months   There is a concern that staff member might be giving patient something sweet to eat to calm her down      Prior history  She did not show up for 3 appointments, discussed with the /nursing home manager the importance of follow-up in the past.  After the last visit again did not show for the appointment. Discussed with the assisted living supervisor again. She is on basal bolus regimen  No hypoglycemia, no hospitalization      Prior history  Fasting hyperglycemia and there are certain days where there are unexplained hyperglycemia   the only way to calm her is to give the  food is what I have learned and I suspect some of the staff members are giving her snacks whenever she is agitated.       She lives in a Group home due to underlying Psychiatric condition     Here with care taker , does not communicate  Monitoring frequency:3 /day         Wt Readings from Last 3 Encounters:   11/17/21 187 lb (84.8 kg)   11/03/21 190 lb (86.2 kg) 05/11/21 177 lb (80.3 kg)       BP Readings from Last 3 Encounters:   11/17/21 120/81   05/11/21 102/64   03/31/21 (!) 77/56           Past Medical History:   Diagnosis Date    Anxiety     Autism     Depression     Diabetes ([facility] [address]     Intermittent explosive disorder     Seizures (HCC)      Current Outpatient Medications   Medication Sig    insulin needles, disposable, (Pentips) 29 gauge x 1/2\" ndle USE TO INJECT INSULIN    glucose blood VI test strips (OneTouch Verio test strips) strip USE TO TEST BLOOD SUGARS 4 TIMES    lancets (TRUEplus Lancets) 30 gauge misc USE TO CHECK BLOOD SUGAR 4 TIMES A DAY    OneTouch Verio Meter misc USE TO TEST BLOOD SUGAR THREE TIMES DAILY    Blood-Glucose Meter (PRODIGY AUTOCODE METER) monitoring kit Test TID Dx Code: E11.65    Calcium Antacid 200 mg calcium (500 mg) chew TAKE 1 TABLET BY MOUTH TWICE A DAY    cholecalciferol (VITAMIN D3) (1000 Units /25 mcg) tablet TAKE 1 TABLET BY MOUTH DAILY    insulin glargine (Lantus Solostar U-100 Insulin) 100 unit/mL (3 mL) inpn INJECT 24 UNITS UNDER THE SKIN AT BEDTIME    divalproex DR (Depakote) 500 mg tablet Take 1,000 mg by mouth nightly.  simvastatin (ZOCOR) 10 mg tablet TAKE 1 TABLET BY MOUTH AT BEDTIME FOR CHOLESTEROL    Amitiza 24 mcg capsule TAKE 1 CAPSULE BY MOUTH TWICE DAILY WITH MEALS    cetirizine (ZYRTEC) 10 mg tablet TAKE 1 TABLET BY MOUTH DAILY FOR ALLERGIES    metFORMIN (GLUCOPHAGE) 1,000 mg tablet TAKE 1 TABLET BY MOUTH IN THE MORNING AND IN THE EVENING WITH FOOD    glucose 4 gram chewable tablet CHEW & SWALLOW 3 TABLETS BY MOUTH IF BLOOD GLUCOSE LESS THAN 70, WAIT15 MINUTES & RECHECK. REPEAT 1 TIME IF NEEDED.  CALL 911 IF NO RESPONSE    metoprolol succinate (TOPROL-XL) 25 mg XL tablet TAKE 1 TABLET BY MOUTH DAILY    insulin lispro (HumaLOG KwikPen Insulin) 100 unit/mL kwikpen INJECT UNDER THE SKIN:10 units BEFORE BREAKFAST,10 units BEFORE LUNCH & 4 units BEFORE DINNER + SLIDING SCALE (NONE AT BEDTIME) (Patient taking differently: Inject 10 units before breakfast and lunch with sliding scale, 4 units before dinner with sliding scale)    fluticasone propionate (FLONASE) 50 mcg/actuation nasal spray BLOW NOSE: 2 SPRAYS IN EACH NOSTRIL DAILY FOR ALLERGY.  polyethylene glycol (MIRALAX) 17 gram/dose powder MIX 1 CAPFUL (SEE 17 gm LINE) IN A GLASS OF WATER & DRINK ONCE DAILY AS NEEDED FOR CONSTIPATION.  medroxyPROGESTERone (DEPO-PROVERA) 150 mg/mL syrg INJECT EVERY 3 MONTHS    neomycin-colist-hydrocortisone-thonzonium (CORTISPORIN TC,COLY-MYCIN S) otic suspension Administer 5 Drops into each ear four (4) times daily.  benzoyl peroxide 5 % external liquid AAA every day prn    cromolyn (OPTICROM) 4 % ophthalmic solution Administer 1 Drop to both eyes four (4) times daily as needed. For itchy, watery eyes    LORazepam (ATIVAN) 1 mg tablet Take 1 mg by mouth two (2) times daily as needed for Anxiety. 1 mg BID PRN    busPIRone (BUSPAR) 10 mg tablet Take 30 mg by mouth two (2) times a day.  ammonium lactate (LAC-HYDRIN) 12 % lotion Apply  to affected area two (2) times a day. rub in to affected area well    divalproex DR (DEPAKOTE) 250 mg tablet Take 250 mg by mouth daily. And 1000 mg QHS    risperiDONE (RISPERDAL) 4 mg tablet Take 4 mg by mouth two (2) times a day. No current facility-administered medications for this visit. Allergies   Allergen Reactions    Other Plant, Animal, Environmental Sneezing         Review of Systems: unable to obtain due to underlying mental status  -     Physical Examination:   Blood pressure 120/81, pulse 90, temperature 97.8 °F (36.6 °C), temperature source Temporal, resp. rate 16, height 5' 4\" (1.626 m), weight 187 lb (84.8 kg), SpO2 100 %. Estimated body mass index is 32.1 kg/m² as calculated from the following:    Height as of this encounter: 5' 4\" (1.626 m). -   Weight as of this encounter: 187 lb (84.8 kg).   - General: pleasant, no distress, good eye contact  - HEENT: no exophthalmos, no periorbital edema, EOMI  - Neck: No thyromegaly  - CVS: S1-S2 regular  - RS: Normal respiratory effort  - Musculoskeletal: no tremors  -   - Skin: Normal color      -   History of callus          Lab Results   Component Value Date/Time    Hemoglobin A1c 7.1 (H) 11/03/2021 11:29 AM    Hemoglobin A1c 7.0 (H) 07/21/2021 04:00 PM    Hemoglobin A1c 7.2 (H) 03/24/2021 03:20 PM    Glucose 171 (H) 11/03/2021 11:29 AM    Glucose  10/10/2018 02:01 PM    Microalb/Creat ratio (ug/mg creat.) 3 03/24/2021 03:20 PM    LDL,Direct 36 12/01/2020 05:00 PM    LDL, calculated 32 03/24/2021 03:20 PM    LDL, calculated 41 02/07/2019 04:02 PM    Creatinine 0.67 11/03/2021 11:29 AM    Hemoglobin A1c, External 6.8 08/22/2019 12:00 AM    Hemoglobin A1c, External 7.5 12/16/2017 12:00 AM    Hemoglobin A1c, External 8.7 09/24/2015 12:00 AM      Lab Results   Component Value Date/Time    GFR est  11/03/2021 11:29 AM    GFR est non- 11/03/2021 11:29 AM    Creatinine 0.67 11/03/2021 11:29 AM    BUN 8 11/03/2021 11:29 AM    Sodium 137 11/03/2021 11:29 AM    Potassium 4.1 11/03/2021 11:29 AM    Chloride 98 11/03/2021 11:29 AM    CO2 19 (L) 11/03/2021 11:29 AM        Assessment/Plan:     1. Type 2 diabetes mellitus with hyperglycemia, with long-term current use of insulin ([address]    2. Mixed hyperlipidemia        1. Type 2 Diabetes Mellitus   Lab Results   Component Value Date/Time    Hemoglobin A1c 7.1 (H) 11/03/2021 11:29 AM    Hemoglobin A1c (POC) 7.7 10/10/2018 02:10 PM    Hemoglobin A1c, External 6.8 08/22/2019 12:00 AM     Lantus 24 units at bedtime  Metformin  Novolog or Humalog  with meals    Stressed the compliance with the diet, low-carb diet,. Due to increase in her psychotropic medications she is also hungry and constantly asking for the food, reportedly gets agitated when she does not get something to eat. 2.  HTN :  cannot tolerate ACE    3.  Hyperlipidemia : Continue statin. 4.Obesity:Body mass index is 32.1 kg/m². Diet managed by Group home      Caregiver verbalized understanding      There are no Patient Instructions on file for this visit. Thank you for allowing me to participate in the care of this patient.     Kathi Milian MD

## 2021-11-17 NOTE — LETTER
11/18/2021    Patient: Demetria Myrick   YOB: 1985   Date of Visit: 11/17/2021     Declan Marquez, [address] [facility], [place] [address]  [place]    Dear Declan Marquez MD,      Thank you for referring Ms. Mohini Rice to [id number] [place] [address] for evaluation. My notes for this consultation are attached. If you have questions, please do not hesitate to call me. I look forward to following your patient along with you.       Sincerely,    Jeffery Robert MD

## 2022-01-13 DIAGNOSIS — I10 ESSENTIAL HYPERTENSION: ICD-10-CM

## 2022-01-13 DIAGNOSIS — R00.0 TACHYCARDIA: ICD-10-CM

## 2022-01-13 RX ORDER — METOPROLOL SUCCINATE 25 MG/1
TABLET, EXTENDED RELEASE ORAL
Qty: 31 TABLET | Refills: 11 | Status: SHIPPED | OUTPATIENT
Start: 2022-01-13

## 2022-01-18 ENCOUNTER — DOCUMENTATION ONLY (OUTPATIENT)
Dept: FAMILY MEDICINE CLINIC | Age: 37
End: 2022-01-18

## 2022-02-17 ENCOUNTER — OFFICE VISIT (OUTPATIENT)
Dept: FAMILY MEDICINE CLINIC | Age: 37
End: 2022-02-17
Payer: MEDICAID

## 2022-02-17 VITALS — BODY MASS INDEX: 31.58 KG/M2 | WEIGHT: 185 LBS | HEIGHT: 64 IN

## 2022-02-17 DIAGNOSIS — Z30.42 DEPO-PROVERA CONTRACEPTIVE STATUS: Primary | ICD-10-CM

## 2022-02-17 LAB
HCG URINE, QL. (POC): NEGATIVE
VALID INTERNAL CONTROL?: YES

## 2022-02-17 PROCEDURE — 81025 URINE PREGNANCY TEST: CPT | Performed by: FAMILY MEDICINE

## 2022-02-17 PROCEDURE — 96372 THER/PROPH/DIAG INJ SC/IM: CPT | Performed by: FAMILY MEDICINE

## 2022-02-17 RX ORDER — MEDROXYPROGESTERONE ACETATE 150 MG/ML
150 INJECTION, SUSPENSION INTRAMUSCULAR ONCE
Qty: 1 EACH | Refills: 0 | Status: SHIPPED | COMMUNITY
Start: 2022-02-17 | End: 2022-02-17

## 2022-02-17 NOTE — PATIENT INSTRUCTIONS
A Healthy Lifestyle: Care Instructions  Your Care Instructions     A healthy lifestyle can help you feel good, stay at a healthy weight, and have plenty of energy for both work and play. A healthy lifestyle is something you can share with your whole family. A healthy lifestyle also can lower your risk for serious health problems, such as high blood pressure, heart disease, and diabetes. You can follow a few steps listed below to improve your health and the health of your family. Follow-up care is a key part of your treatment and safety. Be sure to make and go to all appointments, and call your doctor if you are having problems. It's also a good idea to know your test results and keep a list of the medicines you take. How can you care for yourself at home? · Do not eat too much sugar, fat, or fast foods. You can still have dessert and treats now and then. The goal is moderation. · Start small to improve your eating habits. Pay attention to portion sizes, drink less juice and soda pop, and eat more fruits and vegetables. ? Eat a healthy amount of food. A 3-ounce serving of meat, for example, is about the size of a deck of cards. Fill the rest of your plate with vegetables and whole grains. ? Limit the amount of soda and sports drinks you have every day. Drink more water when you are thirsty. ? Eat plenty of fruits and vegetables every day. Have an apple or some carrot sticks as an afternoon snack instead of a candy bar. Try to have fruits and/or vegetables at every meal.  · Make exercise part of your daily routine. You may want to start with simple activities, such as walking, bicycling, or slow swimming. Try to be active 30 to 60 minutes every day. You do not need to do all 30 to 60 minutes all at once. For example, you can exercise 3 times a day for 10 or 20 minutes.  Moderate exercise is safe for most people, but it is always a good idea to talk to your doctor before starting an exercise program.  · Keep moving. Wilda Fawad the lawn, work in the garden, or [place]. Take the stairs instead of the elevator at work. · If you smoke, quit. People who smoke have an increased risk for heart attack, stroke, cancer, and other lung illnesses. Quitting is hard, but there are ways to boost your chance of quitting tobacco for good. ? Use nicotine gum, patches, or lozenges. ? Ask your doctor about stop-smoking programs and medicines. ? Keep trying. In addition to reducing your risk of diseases in the future, you will notice some benefits soon after you stop using tobacco. If you have shortness of breath or asthma symptoms, they will likely get better within a few weeks after you quit. · Limit how much alcohol you drink. Moderate amounts of alcohol (up to 2 drinks a day for men, 1 drink a day for women) are okay. But drinking too much can lead to liver problems, high blood pressure, and other health problems. Family health  If you have a family, there are many things you can do together to improve your health. · Eat meals together as a family as often as possible. · Eat healthy foods. This includes fruits, vegetables, lean meats and dairy, and whole grains. · Include your family in your fitness plan. Most people think of activities such as jogging or tennis as the way to fitness, but there are many ways you and your family can be more active. Anything that makes you breathe hard and gets your heart pumping is exercise. Here are some tips:  ? Walk to do errands or to take your child to school or the bus.  ? Go for a family bike ride after dinner instead of watching TV. Where can you learn more? Go to http://www.gray.com/  Enter V728 in the search box to learn more about \"A Healthy Lifestyle: Care Instructions. \"  Current as of: June 16, 2021               Content Version: 13.0  © 9894-4570 [employer].    Care instructions adapted under license by [employer] (which disclaims liability or warranty for this information). If you have questions about a medical condition or this instruction, always ask your healthcare professional. [address] any warranty or liability for your use of this information.

## 2022-02-17 NOTE — PROGRESS NOTES
Chief Complaint   Patient presents with    Depo     Patient presents in office today for NV only for depo. Last injection was 11/3/21. UPT ordered and was neg.   Handout given to return between 5/5/22-5/19/22

## 2022-03-03 DIAGNOSIS — Z79.4 UNCONTROLLED TYPE 2 DIABETES MELLITUS WITH HYPERGLYCEMIA, WITH LONG-TERM CURRENT USE OF INSULIN (HCC): ICD-10-CM

## 2022-03-03 DIAGNOSIS — E11.65 UNCONTROLLED TYPE 2 DIABETES MELLITUS WITH HYPERGLYCEMIA, WITH LONG-TERM CURRENT USE OF INSULIN (HCC): ICD-10-CM

## 2022-03-03 RX ORDER — INSULIN LISPRO 100 [IU]/ML
INJECTION, SOLUTION INTRAVENOUS; SUBCUTANEOUS
Qty: 15 ML | Refills: 11 | Status: SHIPPED | OUTPATIENT
Start: 2022-03-03 | End: 2022-08-30 | Stop reason: SDUPTHER

## 2022-03-04 ENCOUNTER — TELEPHONE (OUTPATIENT)
Dept: FAMILY MEDICINE CLINIC | Age: 37
End: 2022-03-04

## 2022-03-04 NOTE — TELEPHONE ENCOUNTER
----- Message from Deepa Samedwige sent at 3/4/2022  4:53 PM EST -----  Subject: Message to Provider    QUESTIONS  Information for Provider? pt needs a appt for her uti symptoms. ---------------------------------------------------------------------------  --------------  Ajit TOLBERT  What is the best way for the office to contact you? OK to leave message on   voicemail  Preferred Call Back Phone Number? [phone]  ---------------------------------------------------------------------------  --------------  SCRIPT ANSWERS  Relationship to Patient?  Third Party  Representative Name? group home

## 2022-03-07 NOTE — TELEPHONE ENCOUNTER
Lvm to Judith ( group home) that nothing available this week , she should take patient to Urgent care center to get her treated for uti if she has one.

## 2022-03-14 DIAGNOSIS — E11.65 UNCONTROLLED TYPE 2 DIABETES MELLITUS WITH HYPERGLYCEMIA, WITH LONG-TERM CURRENT USE OF INSULIN (HCC): ICD-10-CM

## 2022-03-14 DIAGNOSIS — Z79.4 UNCONTROLLED TYPE 2 DIABETES MELLITUS WITH HYPERGLYCEMIA, WITH LONG-TERM CURRENT USE OF INSULIN (HCC): ICD-10-CM

## 2022-03-14 RX ORDER — METFORMIN HYDROCHLORIDE 1000 MG/1
TABLET ORAL
Qty: 62 TABLET | Refills: 11 | Status: SHIPPED | OUTPATIENT
Start: 2022-03-14 | End: 2022-08-30 | Stop reason: SDUPTHER

## 2022-03-17 LAB
BUN SERPL-MCNC: 9 MG/DL (ref 6–20)
BUN/CREAT SERPL: 13 (ref 9–23)
CALCIUM SERPL-MCNC: 9.6 MG/DL (ref 8.7–10.2)
CHLORIDE SERPL-SCNC: 97 MMOL/L (ref 96–106)
CHOLEST SERPL-MCNC: 131 MG/DL (ref 100–199)
CO2 SERPL-SCNC: 18 MMOL/L (ref 20–29)
CREAT SERPL-MCNC: 0.72 MG/DL (ref 0.57–1)
EGFR: 110 ML/MIN/1.73
EST. AVERAGE GLUCOSE BLD GHB EST-MCNC: 157 MG/DL
GLUCOSE SERPL-MCNC: 119 MG/DL (ref 65–99)
HBA1C MFR BLD: 7.1 % (ref 4.8–5.6)
HDLC SERPL-MCNC: 50 MG/DL
IMP & REVIEW OF LAB RESULTS: NORMAL
INTERPRETATION: NORMAL
LDLC SERPL CALC-MCNC: 63 MG/DL (ref 0–99)
POTASSIUM SERPL-SCNC: 4.6 MMOL/L (ref 3.5–5.2)
SODIUM SERPL-SCNC: 135 MMOL/L (ref 134–144)
TRIGL SERPL-MCNC: 96 MG/DL (ref 0–149)
VLDLC SERPL CALC-MCNC: 18 MG/DL (ref 5–40)

## 2022-03-19 PROBLEM — E11.65 TYPE 2 DIABETES MELLITUS WITH HYPERGLYCEMIA, WITH LONG-TERM CURRENT USE OF INSULIN (HCC): Status: ACTIVE | Noted: 2018-08-24

## 2022-03-19 PROBLEM — Z79.4 TYPE 2 DIABETES MELLITUS WITH HYPERGLYCEMIA, WITH LONG-TERM CURRENT USE OF INSULIN (HCC): Status: ACTIVE | Noted: 2018-08-24

## 2022-03-22 ENCOUNTER — OFFICE VISIT (OUTPATIENT)
Dept: ENDOCRINOLOGY | Age: 37
End: 2022-03-22
Payer: MEDICAID

## 2022-03-22 VITALS
HEIGHT: 64 IN | SYSTOLIC BLOOD PRESSURE: 100 MMHG | RESPIRATION RATE: 16 BRPM | WEIGHT: 186 LBS | BODY MASS INDEX: 31.76 KG/M2 | DIASTOLIC BLOOD PRESSURE: 66 MMHG | OXYGEN SATURATION: 100 % | HEART RATE: 97 BPM | TEMPERATURE: 98.5 F

## 2022-03-22 DIAGNOSIS — E78.2 MIXED HYPERLIPIDEMIA: ICD-10-CM

## 2022-03-22 DIAGNOSIS — E66.9 NON MORBID OBESITY: ICD-10-CM

## 2022-03-22 DIAGNOSIS — E11.65 TYPE 2 DIABETES MELLITUS WITH HYPERGLYCEMIA, WITH LONG-TERM CURRENT USE OF INSULIN (HCC): Primary | ICD-10-CM

## 2022-03-22 DIAGNOSIS — Z79.4 TYPE 2 DIABETES MELLITUS WITH HYPERGLYCEMIA, WITH LONG-TERM CURRENT USE OF INSULIN (HCC): Primary | ICD-10-CM

## 2022-03-22 PROCEDURE — 99214 OFFICE O/P EST MOD 30 MIN: CPT | Performed by: INTERNAL MEDICINE

## 2022-03-22 PROCEDURE — 3051F HG A1C>EQUAL 7.0%<8.0%: CPT | Performed by: INTERNAL MEDICINE

## 2022-03-22 NOTE — PROGRESS NOTES
Albert Phillips MD             Patient Information  Date:3/26/2022  Name : Anastasiia Koehler 43 y.o.     YOB: 1985         Referred by:  Group home    Todd Vazquez       Chief Complaint   Patient presents with    Diabetes       History of Present Illness: Anastasiia Koehler is a 43 y.o. female here for fu of  Type 2 Diabetes Mellitus. Type 2 diabetes mellitus: On MDI  She is here with her caregiver    On insulin, reviewed MAR  Reviewed the log, blood glucose at 300s after coming back from the day support, which is due to sweet tea and sandwich at [facility] , some unexplained, she was seen drinking chocolate syrup from the fridge, sneaking food and some of the blood glucose may not be really fasting  She constantly asks for food    Prior history    Reviewed the log, blood glucose lately has been high, mostly between lunch and dinner and there is a pattern which is noticed when one of the staff member was at work which is when the blood glucose would go from 100 before lunch to more than 300 at dinnertime. This pattern was not observed in the prior months   There is a concern that staff member might be giving patient something sweet to eat to calm her down      Prior history  She did not show up for 3 appointments, discussed with the /nursing home manager the importance of follow-up in the past.  After the last visit again did not show for the appointment. Discussed with the assisted living supervisor again. She is on basal bolus regimen  No hypoglycemia, no hospitalization      Prior history  Fasting hyperglycemia and there are certain days where there are unexplained hyperglycemia   the only way to calm her is to give the  food is what I have learned and I suspect some of the staff members are giving her snacks whenever she is agitated.       She lives in a Group home due to underlying Psychiatric condition     Here with care taker , does not communicate  Monitoring frequency:3 /day         Wt Readings from Last 3 Encounters:   03/22/22 186 lb (84.4 kg)   02/17/22 185 lb (83.9 kg)   11/17/21 187 lb (84.8 kg)       BP Readings from Last 3 Encounters:   03/22/22 100/66   11/17/21 120/81   05/11/21 102/64           Past Medical History:   Diagnosis Date    Anxiety     Autism     Depression     Diabetes ([facility] [address]     Intermittent explosive disorder     Seizures (HCC)      Current Outpatient Medications   Medication Sig    metFORMIN (GLUCOPHAGE) 1,000 mg tablet TAKE 1 TABLET BY MOUTH IN THE MORNING AND IN THE EVENING WITH FOOD    insulin lispro (HumaLOG KwikPen Insulin) 100 unit/mL kwikpen INJECT 10 units UNDER THE SKIN BEFORE BREAKFAST AND BEFORE LUNCH + SLIDING SCALE 150-200:2UNIT,201-250: 4UNIT,251-300:6UNIT, 301- 350:8UNIT,351-400:10UNIT INJECT 4 units UNDER THE SKIN BEFORE DINNER +    metoprolol succinate (TOPROL-XL) 25 mg XL tablet TAKE 1 TABLET BY MOUTH DAILY    Calcium Antacid 200 mg calcium (500 mg) chew TAKE 1 TABLET BY MOUTH TWICE A DAY    insulin needles, disposable, (Pentips) 29 gauge x 1/2\" ndle USE TO INJECT INSULIN    glucose blood VI test strips (OneTouch Verio test strips) strip USE TO TEST BLOOD SUGARS 4 TIMES    lancets (TRUEplus Lancets) 30 gauge misc USE TO CHECK BLOOD SUGAR 4 TIMES A DAY    cholecalciferol (VITAMIN D3) (1000 Units /25 mcg) tablet TAKE 1 TABLET BY MOUTH DAILY    insulin glargine (Lantus Solostar U-100 Insulin) 100 unit/mL (3 mL) inpn INJECT 24 UNITS UNDER THE SKIN AT BEDTIME    divalproex DR (Depakote) 500 mg tablet Take 1,000 mg by mouth nightly.     simvastatin (ZOCOR) 10 mg tablet TAKE 1 TABLET BY MOUTH AT BEDTIME FOR CHOLESTEROL    Amitiza 24 mcg capsule TAKE 1 CAPSULE BY MOUTH TWICE DAILY WITH MEALS    cetirizine (ZYRTEC) 10 mg tablet TAKE 1 TABLET BY MOUTH DAILY FOR ALLERGIES    glucose 4 gram chewable tablet CHEW & SWALLOW 3 TABLETS BY MOUTH IF BLOOD GLUCOSE LESS THAN 70, [address] [facility]. REPEAT 1 TIME IF NEEDED. CALL 911 IF NO RESPONSE    OneTouch Verio Meter misc USE TO TEST BLOOD SUGAR THREE TIMES DAILY    medroxyPROGESTERone (DEPO-PROVERA) 150 mg/mL syrg INJECT EVERY 3 MONTHS    Blood-Glucose Meter (PRODIGY AUTOCODE METER) monitoring kit Test TID Dx Code: E11.65    LORazepam (ATIVAN) 1 mg tablet Take 1 mg by mouth two (2) times daily as needed for Anxiety. 1 mg BID PRN    busPIRone (BUSPAR) 10 mg tablet Take 30 mg by mouth two (2) times a day.  divalproex DR (DEPAKOTE) 250 mg tablet Take 250 mg by mouth daily. And 1000 mg QHS    risperiDONE (RISPERDAL) 4 mg tablet Take 4 mg by mouth two (2) times a day.  fluconazole (DIFLUCAN) 150 mg tablet Take 150 mg by mouth every thirty (30) days. FDA advises cautious prescribing of oral fluconazole in pregnancy. (Patient not taking: Reported on 3/22/2022)    fluticasone propionate (FLONASE) 50 mcg/actuation nasal spray BLOW NOSE: 2 SPRAYS IN EACH NOSTRIL DAILY FOR ALLERGY. (Patient not taking: Reported on 3/22/2022)    polyethylene glycol (MIRALAX) 17 gram/dose powder MIX 1 CAPFUL (SEE 17 gm LINE) IN A GLASS OF WATER & DRINK ONCE DAILY AS NEEDED FOR CONSTIPATION. (Patient not taking: Reported on 11/17/2021)    neomycin-colist-hydrocortisone-thonzonium (CORTISPORIN TC,COLY-MYCIN S) otic suspension Administer 5 Drops into each ear four (4) times daily. (Patient not taking: Reported on 11/17/2021)    benzoyl peroxide 5 % external liquid AAA every day prn (Patient not taking: Reported on 11/17/2021)    cromolyn (OPTICROM) 4 % ophthalmic solution Administer 1 Drop to both eyes four (4) times daily as needed. For itchy, watery eyes (Patient not taking: Reported on 11/17/2021)    ammonium lactate (LAC-HYDRIN) 12 % lotion Apply  to affected area two (2) times a day. rub in to affected area well (Patient not taking: Reported on 11/17/2021)     No current facility-administered medications for this visit.      Allergies Allergen Reactions    Other Plant, Animal, Environmental Sneezing         Review of Systems: unable to obtain due to underlying mental status  -     Physical Examination:   Blood pressure 100/66, pulse 97, temperature 98.5 °F (36.9 °C), temperature source Temporal, resp. rate 16, height 5' 4\" (1.626 m), weight 186 lb (84.4 kg), SpO2 100 %. Estimated body mass index is 31.93 kg/m² as calculated from the following:    Height as of this encounter: 5' 4\" (1.626 m). -   Weight as of this encounter: 186 lb (84.4 kg). - General: pleasant, no distress, good eye contact  - HEENT: no exophthalmos, no periorbital edema, EOMI  - Neck: No thyromegaly  - CVS: S1-S2 regular  - RS: Normal respiratory effort  - Musculoskeletal: no tremors  -   - Skin: Normal color      -   History of callus          Lab Results   Component Value Date/Time    Hemoglobin A1c 7.1 (H) 03/16/2022 09:41 AM    Hemoglobin A1c 7.1 (H) 11/03/2021 11:29 AM    Hemoglobin A1c 7.0 (H) 07/21/2021 04:00 PM    Glucose 119 (H) 03/16/2022 09:41 AM    Glucose  10/10/2018 02:01 PM    Microalb/Creat ratio (ug/mg creat.) 3 03/24/2021 03:20 PM    LDL,Direct 36 12/01/2020 05:00 PM    LDL, calculated 63 03/16/2022 09:41 AM    LDL, calculated 41 02/07/2019 04:02 PM    Creatinine 0.72 03/16/2022 09:41 AM    Hemoglobin A1c, External 6.8 08/22/2019 12:00 AM    Hemoglobin A1c, External 7.5 12/16/2017 12:00 AM    Hemoglobin A1c, External 8.7 09/24/2015 12:00 AM      Lab Results   Component Value Date/Time    GFR est  11/03/2021 11:29 AM    GFR est non- 11/03/2021 11:29 AM    Creatinine 0.72 03/16/2022 09:41 AM    BUN 9 03/16/2022 09:41 AM    Sodium 135 03/16/2022 09:41 AM    Potassium 4.6 03/16/2022 09:41 AM    Chloride 97 03/16/2022 09:41 AM    CO2 18 (L) 03/16/2022 09:41 AM        Assessment/Plan:     1. Type 2 diabetes mellitus with hyperglycemia, with long-term current use of insulin ([facility] [address]    2. Mixed hyperlipidemia    3. Non morbid obesity        1. Type 2 Diabetes Mellitus   Lab Results   Component Value Date/Time    Hemoglobin A1c 7.1 (H) 03/16/2022 09:41 AM    Hemoglobin A1c (POC) 7.7 10/10/2018 02:10 PM    Hemoglobin A1c, External 6.8 08/22/2019 12:00 AM     Lantus 24 units at bedtime  Metformin  Novolog or Humalog  with meals    Stressed the compliance with the diet, low-carb diet,. Due to increase in her psychotropic medications she is also hungry and constantly asking for the food, reportedly gets agitated when she does not get something to eat. She is sneaking food which is causing hyperglycemia. It is a challenge to control blood glucose in these situations. 2.  HTN :  cannot tolerate ACE    3. Hyperlipidemia : Continue statin. 4.Obesity:Body mass index is 31.93 kg/m². Diet managed by Group home      Caregiver verbalized understanding      There are no Patient Instructions on file for this visit. Follow-up and Dispositions    · Return in about 5 months (around 8/22/2022) for labs before next visit and follow up. Thank you for allowing me to participate in the care of this patient.     Marino Vogel MD

## 2022-03-22 NOTE — LETTER
3/26/2022    Patient: Gilbert Miramontes   YOB: 1985   Date of Visit: 3/22/2022     Nimco Coates, [address] [facility] [address]  [address] [facility] [address]    Dear Nimco Coates MD,      Thank you for referring Ms. Mohini Rice to [id number] [place] [address] for evaluation. My notes for this consultation are attached. If you have questions, please do not hesitate to call me. I look forward to following your patient along with you.       Sincerely,    Maritza Padilla MD

## 2022-03-22 NOTE — PROGRESS NOTES
Noni Hawkins is a 43 y.o. female here for   Chief Complaint   Patient presents with    Diabetes       1. Have you been to the ER, urgent care clinic since your last visit? Hospitalized since your last visit? -no    2. Have you seen or consulted any other health care providers outside of the [address] [place] [address] since your last visit?   Include any pap smears or colon screening.-podiatry 2 weeks ago     Order placed for pt per verbal order with read back from Dr. Rafita Luis 03/22/22

## 2022-04-11 RX ORDER — SIMVASTATIN 10 MG/1
TABLET, FILM COATED ORAL
Qty: 30 TABLET | Refills: 12 | Status: SHIPPED | OUTPATIENT
Start: 2022-04-11

## 2022-04-11 RX ORDER — LUBIPROSTONE 24 UG/1
CAPSULE, GELATIN COATED ORAL
Qty: 60 CAPSULE | Refills: 12 | Status: SHIPPED | OUTPATIENT
Start: 2022-04-11

## 2022-04-11 RX ORDER — CETIRIZINE HCL 10 MG
TABLET ORAL
Qty: 30 TABLET | Refills: 12 | Status: SHIPPED | OUTPATIENT
Start: 2022-04-11

## 2022-04-28 DIAGNOSIS — Z79.4 TYPE 2 DIABETES MELLITUS WITH HYPERGLYCEMIA, WITH LONG-TERM CURRENT USE OF INSULIN (HCC): Primary | ICD-10-CM

## 2022-04-28 DIAGNOSIS — E11.65 TYPE 2 DIABETES MELLITUS WITH HYPERGLYCEMIA, WITH LONG-TERM CURRENT USE OF INSULIN (HCC): Primary | ICD-10-CM

## 2022-04-28 RX ORDER — MAGNESIUM SULFATE 100 %
CRYSTALS MISCELLANEOUS
Qty: 10 TABLET | Refills: 11 | Status: SHIPPED | OUTPATIENT
Start: 2022-04-28

## 2022-05-19 ENCOUNTER — OFFICE VISIT (OUTPATIENT)
Dept: FAMILY MEDICINE CLINIC | Age: 37
End: 2022-05-19
Payer: MEDICAID

## 2022-05-19 DIAGNOSIS — N92.6 IRREGULAR MENSES: Primary | ICD-10-CM

## 2022-05-19 PROCEDURE — 96372 THER/PROPH/DIAG INJ SC/IM: CPT | Performed by: FAMILY MEDICINE

## 2022-05-19 RX ORDER — MEDROXYPROGESTERONE ACETATE 150 MG/ML
150 INJECTION, SUSPENSION INTRAMUSCULAR ONCE
Qty: 1 ML | Refills: 0 | Status: SHIPPED | COMMUNITY
Start: 2022-05-19 | End: 2022-05-19

## 2022-05-19 NOTE — PROGRESS NOTES
Chief Complaint   Patient presents with    Depo     Patient presents in office today for NV only for depo. Last injection was 2/17/22. Handout given to return between 8/4/22-8/18/22.

## 2022-05-23 DIAGNOSIS — E11.65 TYPE 2 DIABETES MELLITUS WITH HYPERGLYCEMIA, WITH LONG-TERM CURRENT USE OF INSULIN (HCC): ICD-10-CM

## 2022-05-23 DIAGNOSIS — Z79.4 TYPE 2 DIABETES MELLITUS WITH HYPERGLYCEMIA, WITH LONG-TERM CURRENT USE OF INSULIN (HCC): ICD-10-CM

## 2022-05-23 RX ORDER — BLOOD-GLUCOSE CONTROL, NORMAL
EACH MISCELLANEOUS
Qty: 100 LANCET | Refills: 11 | Status: SHIPPED | OUTPATIENT
Start: 2022-05-23 | End: 2022-08-30 | Stop reason: SDUPTHER

## 2022-06-10 ENCOUNTER — OFFICE VISIT (OUTPATIENT)
Dept: FAMILY MEDICINE CLINIC | Age: 37
End: 2022-06-10
Payer: MEDICAID

## 2022-06-10 VITALS
HEART RATE: 96 BPM | BODY MASS INDEX: 31.41 KG/M2 | WEIGHT: 184 LBS | SYSTOLIC BLOOD PRESSURE: 105 MMHG | TEMPERATURE: 97.6 F | HEIGHT: 64 IN | RESPIRATION RATE: 16 BRPM | DIASTOLIC BLOOD PRESSURE: 74 MMHG | OXYGEN SATURATION: 97 %

## 2022-06-10 DIAGNOSIS — S61.411A LACERATION OF RIGHT HAND WITHOUT FOREIGN BODY, INITIAL ENCOUNTER: Primary | ICD-10-CM

## 2022-06-10 DIAGNOSIS — L03.113 CELLULITIS OF RIGHT UPPER EXTREMITY: ICD-10-CM

## 2022-06-10 DIAGNOSIS — Z23 ENCOUNTER FOR IMMUNIZATION: ICD-10-CM

## 2022-06-10 PROCEDURE — 99214 OFFICE O/P EST MOD 30 MIN: CPT | Performed by: FAMILY MEDICINE

## 2022-06-10 PROCEDURE — 90715 TDAP VACCINE 7 YRS/> IM: CPT | Performed by: FAMILY MEDICINE

## 2022-06-10 RX ORDER — DOXYCYCLINE 100 MG/1
100 TABLET ORAL 2 TIMES DAILY
Qty: 14 TABLET | Refills: 0 | Status: SHIPPED | OUTPATIENT
Start: 2022-06-10 | End: 2022-06-17

## 2022-06-10 RX ORDER — BUSPIRONE HYDROCHLORIDE 30 MG/1
TABLET ORAL
COMMUNITY
Start: 2022-06-01

## 2022-06-10 RX ORDER — BACITRACIN ZINC 500 UNIT/G
OINTMENT (GRAM) TOPICAL 2 TIMES DAILY
Qty: 15 G | Refills: 0 | Status: SHIPPED | OUTPATIENT
Start: 2022-06-10 | End: 2022-08-30

## 2022-06-10 RX ORDER — LORAZEPAM 2 MG/1
2 TABLET ORAL AS NEEDED
COMMUNITY
Start: 2022-06-04 | End: 2022-08-30

## 2022-06-10 NOTE — PROGRESS NOTES
Verified name and birth date for privacy precautions. Chart reviewed in preparation for today's visit. Chief Complaint   Patient presents with    Hand Injury     Caregiver reports noticing pt's R palm was cut x2 weeks ago. They have been cleaning the site with alcohol and topical ointment, but pt sticks her hand in the toilet bowl frequently causing the bandage to become wet. Other staff within the home reported pt was picking with her wound on Monday causing it to open back up. Wound reported to have odor and clear - yellow draingage. Health Maintenance Due   Topic    COVID-19 Vaccine (1)    Eye Exam Retinal or Dilated     DTaP/Tdap/Td series (1 - Tdap)    Cervical cancer screen     Pneumococcal 0-64 years (2 - PCV)    Foot Exam Q1     MICROALBUMIN Q1     Depression Screen     Medicare Yearly Exam          Wt Readings from Last 3 Encounters:   06/10/22 184 lb (83.5 kg)   03/22/22 186 lb (84.4 kg)   02/17/22 185 lb (83.9 kg)     Temp Readings from Last 3 Encounters:   06/10/22 97.6 °F (36.4 °C) (Oral)   03/22/22 98.5 °F (36.9 °C) (Temporal)   11/17/21 97.8 °F (36.6 °C) (Temporal)     BP Readings from Last 3 Encounters:   06/10/22 105/74   03/22/22 100/66   11/17/21 120/81     Pulse Readings from Last 3 Encounters:   06/10/22 (!) 109   03/22/22 97   11/17/21 90         Learning Assessment:  :     Learning Assessment 5/20/2016   PRIMARY LEARNER Patient   PRIMARY LANGUAGE ENGLISH   LEARNER PREFERENCE PRIMARY DEMONSTRATION   ANSWERED BY Care Supervisor   RELATIONSHIP OTHER       Depression Screening:  :     3 most recent PHQ Screens 5/11/2021   Little interest or pleasure in doing things Not at all   Feeling down, depressed, irritable, or hopeless Not at all   Total Score PHQ 2 0       Fall Risk Assessment:  :     No flowsheet data found. Abuse Screening:  :     Abuse Screening Questionnaire 5/23/2018   Do you ever feel afraid of your partner?  N   Are you in a relationship with someone who physically or mentally threatens you? N   Is it safe for you to go home? Y       Coordination of Care Questionnaire:  :     1) Have you been to an emergency room, urgent care clinic since your last visit? Yes; dispatch health for UTI's . Hospitalized since your last visit? no             2) Have you seen or consulted any other health care providers outside of [address] [place] [address] since your last visit? yes (Include any pap smears or colon screenings in this section.)    3) Do you have an Advance Directive on file? no      Patient is currently accompanied by her adult caretaker. ------------------------------------------------    Devaughn Zhao is a 43 y.o. female who presents for TDAP immunizations. She denies any symptoms , reactions or allergies that would exclude them from being immunized today. Risks and adverse reactions were discussed and the VIS was given to them. All questions were addressed. She was observed for 10 min post injection. There were no reactions observed.     Glenn Solis LPN

## 2022-06-10 NOTE — PROGRESS NOTES
Joselin Tinoco (: 1985) is a 43 y.o. female, established patient, here for evaluation of the following chief complaint(s):  Hand Injury (Caregiver reports noticing pt's R palm was cut x2 weeks ago. They have been cleaning the site with alcohol and topical ointment, but pt sticks her hand in the toilet bowl frequently causing the bandage to become wet. Other staff within the home reported pt was picking with her wound on Monday causing it to open back up. Wound reported to have odor and clear - yellow draingage. )         ASSESSMENT/PLAN:  Below is the assessment and plan developed based on review of pertinent history, physical exam, labs, studies, and medications. 1. Laceration of right hand without foreign body, initial encounter  -     CULTURE, BODY FLUID W GRAM STAIN; Future  -     bacitracin zinc (BACITRACIN) ointment; Apply  to affected area two (2) times a day., Normal, Disp-15 g, R-0  -     TDAP, BOOSTRIX, (AGE 10 YRS+), IM  -     IA IMMUNIZ ADMIN,1 SINGLE/COMB VAC/TOXOID  2. Cellulitis of right upper extremity  -     doxycycline (ADOXA) 100 mg tablet; Take 1 Tablet by mouth two (2) times a day for 7 days. , Normal, Disp-14 Tablet, R-0  -     bacitracin zinc (BACITRACIN) ointment; Apply  to affected area two (2) times a day., Normal, Disp-15 g, R-0  3. Encounter for immunization  -     TDAP, [facility], (AGE 10 YRS+), IM  -     IA IMMUNIZ ADMIN,1 SINGLE/COMB VAC/TOXOID    The laceration itself is healing well, concern for cellulitis surrounding the wound from the blister. Culture the drainage underlying blister   Start doxy for MRSA coverage-- hold calcium supplement while taking doxy   tdap given as laceration might have originally been from a metal bolt and unknown last tdap   Bacitracin topical BID  Keep wound cleansed daily with a dry, clean bandage.  Discussed with caregiver the Important to keep patient from sticking her hand in the toilet to keep wound clean      Return in about 5 days (around 6/15/2022) for wound check, ok to double book. SUBJECTIVE/OBJECTIVE:  Chief Complaint   Patient presents with    Hand Injury     Caregiver reports noticing pt's R palm was cut x2 weeks ago. They have been cleaning the site with alcohol and topical ointment, but pt sticks her hand in the toilet bowl frequently causing the bandage to become wet. Other staff within the home reported pt was picking with her wound on Monday causing it to open back up. Wound reported to have odor and clear - yellow draingage. Patient is a 43 female with intellectual disability and T2DM presents to the office for a wound on her hand. She is with caregiver. The staff noticed a cut on her R hand, palmar surface 2 weeks ago. They are unsure the precise cause however they did find small amount of blood in the patient's room near where there was a bolt protruding from the floor and were wondering if she cut it on the bolt. They were treating it with alcohol and abx cream, keeping it covered with bandages. She likes to stick her hand in the toilet bowl which caused bandage to get wet. Patient was also picking at the cut yesterday. Yesterday it looked like a big blister, blister popped and a serosanguineous liquid drained out. There was slight yellow discharge noted on the bandage. Caregiver reports compared to yesterday the wound looks like it is improving. Yesterday the cut was still through the skin. However today the wound has completely closed. The blister completely popped. There is slight yellowish drainage underneath of the blistered skin. Patient has not had a fever or chills. No nausea or vomiting. No confusion or disorientation outside of her baseline. There has been no swelling around the wound, no excessive drainage, no red streaking, or and is not swollen or warm to the touch. The area is not tender to the touch. Patient does have diabetes, and she does have a history of poor wound healing. They are trying to keep her from get sticking her hand in the toilet however she sticks her head in the toilet multiple times per day. She has no muscle spasms, rigidity, jaw clenching, fever. She has no hx of MRSA    Review of systems negative other than mentioned in HPI    Current Outpatient Medications on File Prior to Visit   Medication Sig Dispense Refill    busPIRone (BUSPAR) 30 mg tablet       lancets (TRUEplus Lancets) 30 gauge misc USE TO CHECK BLOOD SUGAR 4 TIMES A  Lancet 11    glucose 4 gram chewable tablet CHEW & SWALLOW 3 TABLETS BY MOUTH IF BLOOD GLUCOSE LESS THAN 70, WAIT15 MINUTES & RECHECK. REPEAT 1 TIME IF NEEDED.  CALL 911 IF NO RESPONSE 10 Tablet 11    simvastatin (ZOCOR) 10 mg tablet TAKE 1 TABLET BY MOUTH AT BEDTIME FOR CHOLESTEROL 30 Tablet 12    cetirizine (ZYRTEC) 10 mg tablet TAKE 1 TABLET BY MOUTH DAILY FOR ALLERGIES 30 Tablet 12    lubiPROStone (AMITIZA) 24 mcg capsule TAKE 1 CAPSULE BY MOUTH TWICE DAILY WITH MEALS 60 Capsule 12    metFORMIN (GLUCOPHAGE) 1,000 mg tablet TAKE 1 TABLET BY MOUTH IN THE MORNING AND IN THE EVENING WITH FOOD 62 Tablet 11    insulin lispro (HumaLOG KwikPen Insulin) 100 unit/mL kwikpen INJECT 10 units UNDER THE SKIN BEFORE BREAKFAST AND BEFORE LUNCH + SLIDING SCALE 150-200:2UNIT,201-250: 4UNIT,251-300:6UNIT, 301- 350:8UNIT,351-400:10UNIT INJECT 4 units UNDER THE SKIN BEFORE DINNER + 15 mL 11    metoprolol succinate (TOPROL-XL) 25 mg XL tablet TAKE 1 TABLET BY MOUTH DAILY 31 Tablet 11    Calcium Antacid 200 mg calcium (500 mg) chew TAKE 1 TABLET BY MOUTH TWICE A DAY 62 Tablet 12    insulin needles, disposable, (Pentips) 29 gauge x 1/2\" ndle USE TO INJECT INSULIN 100 Pen Needle 11    glucose blood VI test strips (OneTouch Verio test strips) strip USE TO TEST BLOOD SUGARS 4 TIMES 100 Strip 12    cholecalciferol (VITAMIN D3) (1000 Units /25 mcg) tablet TAKE 1 TABLET BY MOUTH DAILY 30 Tablet 11    insulin glargine (Lantus Solostar U-100 Insulin) 100 unit/mL (3 mL) inpn INJECT 24 UNITS UNDER THE SKIN AT BEDTIME 9 mL 12    divalproex DR (Depakote) 500 mg tablet Take 1,000 mg by mouth nightly.  OneTouch Verio Meter misc USE TO TEST BLOOD SUGAR THREE TIMES DAILY 1 Each 0    medroxyPROGESTERone (DEPO-PROVERA) 150 mg/mL syrg INJECT EVERY 3 MONTHS 1 mL 3    Blood-Glucose Meter (PRODIGY AUTOCODE METER) monitoring kit Test TID Dx Code: E11.65 1 Kit 0    LORazepam (ATIVAN) 1 mg tablet Take 1 mg by mouth two (2) times a day.  ammonium lactate (LAC-HYDRIN) 12 % lotion Apply  to affected area two (2) times a day. rub in to affected area well       divalproex DR (DEPAKOTE) 250 mg tablet Take 250 mg by mouth daily. And 1000 mg QHS      risperiDONE (RISPERDAL) 4 mg tablet Take 4 mg by mouth two (2) times a day.  LORazepam (ATIVAN) 2 mg tablet Take 2 mg by mouth as needed.  [DISCONTINUED] fluconazole (DIFLUCAN) 150 mg tablet Take 150 mg by mouth every thirty (30) days. FDA advises cautious prescribing of oral fluconazole in pregnancy. (Patient not taking: Reported on 3/22/2022)      fluticasone propionate (FLONASE) 50 mcg/actuation nasal spray BLOW NOSE: 2 SPRAYS IN EACH NOSTRIL DAILY FOR ALLERGY. (Patient not taking: Reported on 3/22/2022) 16 g 12    polyethylene glycol (MIRALAX) 17 gram/dose powder MIX 1 CAPFUL (SEE 17 gm LINE) IN A GLASS OF WATER & DRINK ONCE DAILY AS NEEDED FOR CONSTIPATION. (Patient not taking: Reported on 11/17/2021) 527 g 11    neomycin-colist-hydrocortisone-thonzonium (CORTISPORIN TC,COLY-MYCIN S) otic suspension Administer 5 Drops into each ear four (4) times daily. (Patient not taking: Reported on 6/10/2022)      benzoyl peroxide 5 % external liquid AAA every day prn (Patient not taking: Reported on 11/17/2021) 1 Bottle 5    cromolyn (OPTICROM) 4 % ophthalmic solution Administer 1 Drop to both eyes four (4) times daily as needed.  For itchy, watery eyes (Patient not taking: Reported on 11/17/2021) 10 mL 1    busPIRone (BUSPAR) 10 mg tablet Take 30 mg by mouth two (2) times a day. (Patient not taking: Reported on 6/10/2022)       No current facility-administered medications on file prior to visit. Patient Active Problem List    Diagnosis Date Noted    Type 2 diabetes mellitus with hyperglycemia, with long-term current use of insulin ([facility] [address] 08/24/2018    Non morbid obesity 05/21/2016    BMI 34.0-34.9,adult 11/06/2015    Mixed hyperlipidemia 10/14/2015    Active autistic disorder 04/23/2013    Hypovitaminosis D 04/23/2013     Allergies   Allergen Reactions    Other Plant, Animal, Environmental Sneezing     Family History   Problem Relation Age of Onset    Diabetes Mother     Diabetes Father     Heart Disease Father     Cancer Maternal Grandfather      No past surgical history on file. Social History     Tobacco Use    Smoking status: Never Smoker    Smokeless tobacco: Never Used   Substance Use Topics    Alcohol use: No    Drug use: No       Vitals:    06/10/22 1518 06/10/22 1541   BP: 105/74    Pulse: (!) 109 96   Resp: 16    Temp: 97.6 °F (36.4 °C)    TempSrc: Oral    SpO2: 97%    Weight: 184 lb (83.5 kg)    Height: 5' 4\" (1.626 m)         Physical Exam  Constitutional:       General: She is not in acute distress. Appearance: Normal appearance. She is not ill-appearing or toxic-appearing. HENT:      Head: Normocephalic and atraumatic. Eyes:      Extraocular Movements: Extraocular movements intact. Conjunctiva/sclera: Conjunctivae normal.      Pupils: Pupils are equal, round, and reactive to light. Cardiovascular:      Rate and Rhythm: Normal rate and regular rhythm. Pulses: Normal pulses. Heart sounds: Normal heart sounds. Pulmonary:      Effort: Pulmonary effort is normal.      Breath sounds: Normal breath sounds. Abdominal:      General: Abdomen is flat. Bowel sounds are normal.      Palpations: Abdomen is soft. Skin:     Comments:  Well healing linear laceration palmar surface of R hand. There is surrounding light pink skin with overlying popped blister. There is minimal yellow discharge under the blistered skin   Neurological:      Mental Status: She is alert. Psychiatric:         Mood and Affect: Mood normal.         Behavior: Behavior normal.                 An electronic signature was used to authenticate this note.   -- Mary Berg PA-C

## 2022-06-10 NOTE — PATIENT INSTRUCTIONS
Hold the Calcium chew until after doxycycline is completed     Call office for spreading redness or worsening drainage. Very important to keep hand out of toilet! Vaccine Information Statement    Tdap (Tetanus, Diphtheria, Pertussis) Vaccine: What You Need to Know     Many vaccine information statements are available in [place] and other languages. See www.immunize.org/vis. Hojas de información sobre vacunas están disponibles en español y en muchos otros idiomas. Visite www.immunize.org/vis. 1. Why get vaccinated? Tdap vaccine can prevent tetanus, diphtheria, and pertussis. Diphtheria and pertussis spread from person to person. Tetanus enters the body through cuts or wounds.  TETANUS (T) causes painful stiffening of the muscles. Tetanus can lead to serious health problems, including being unable to open the mouth, having trouble swallowing and breathing, or death.  DIPHTHERIA (D) can lead to difficulty breathing, heart failure, paralysis, or death.  PERTUSSIS (aP), also known as whooping cough, can cause uncontrollable, violent coughing that makes it hard to breathe, eat, or drink. Pertussis can be extremely serious especially in babies and young children, causing pneumonia, convulsions, brain damage, or death. In teens and adults, it can cause weight loss, loss of bladder control, passing out, and rib fractures from severe coughing. 2. Tdap vaccine     Tdap is only for children 7 years and older, adolescents, and adults. Adolescents should receive a single dose of Tdap, preferably at age 6 or 15 years. Pregnant people should get a dose of Tdap during every pregnancy, preferably during the early part of the third trimester, to help protect the  from pertussis. Infants are most at risk for severe, life-threatening complications from pertussis. Adults who have never received Tdap should get a dose of Tdap.       Also, adults should receive a booster dose of either Tdap or Td (a different vaccine that protects against tetanus and diphtheria but not pertussis) every 10 years, or after 5 years in the case of a severe or dirty wound or burn. Tdap may be given at the same time as other vaccines. 3. Talk with your health care provider    Tell your vaccination provider if the person getting the vaccine:   Has had an allergic reaction after a previous dose of any vaccine that protects against tetanus, diphtheria, or pertussis, or has any severe, life-threatening allergies    Has had a coma, decreased level of consciousness, or prolonged seizures within 7 days after a previous dose of any pertussis vaccine (DTP, DTaP, or Tdap)   Has seizures or another nervous system problem   Has ever had Guillain-Barré Syndrome (also called GBS)   Has had severe pain or swelling after a previous dose of any vaccine that protects against tetanus or diphtheria    In some cases, your health care provider may decide to postpone Tdap vaccination until a future visit. People with minor illnesses, such as a cold, may be vaccinated. People who are moderately or severely ill should usually wait until they recover before getting Tdap vaccine. Your health care provider can give you more information. 4. Risks of a vaccine reaction     Pain, redness, or swelling where the shot was given, mild fever, headache, feeling tired, and nausea, vomiting, diarrhea, or stomachache sometimes happen after Tdap vaccination. People sometimes faint after medical procedures, including vaccination. Tell your provider if you feel dizzy or have vision changes or ringing in the ears. As with any medicine, there is a very remote chance of a vaccine causing a severe allergic reaction, other serious injury, or death. 5. What if there is a serious problem? An allergic reaction could occur after the vaccinated person leaves the clinic.  If you see signs of a severe allergic reaction (hives, swelling of the face and throat, difficulty breathing, a fast heartbeat, dizziness, or weakness), call 9-1-1 and get the person to the nearest hospital.    For other signs that concern you, call your health care provider. Adverse reactions should be reported to the Vaccine Adverse Event Reporting System (VAERS). Your health care provider will usually file this report, or you can do it yourself. Visit the VAERS website at www.vaers. [facility].gov or call 1-932.236.6132. VAERS is only for reporting reactions, and VAERS staff members do not give medical advice. 6. The National Vaccine Injury Compensation Program    The [facility] Vaccine Injury Compensation Program (VICP) is a federal program that was created to compensate people who may have been injured by certain vaccines. Claims regarding alleged injury or death due to vaccination have a time limit for filing, which may be as short as two years. Visit the VICP website at www.[facility].gov/vaccinecompensation or call 3-212.801.9099 to learn about the program and about filing a claim. 7. How can I learn more?  Ask your health care provider.  Call your local or state health department.  Visit the website of the Food and Drug Administration (FDA) for vaccine package inserts and additional information at www.fda.gov/vaccines-blood-biologics/vaccines.  Contact the Centers for Disease Control and Prevention (CDC):  - Call 8-930.776.1537 (1-800-CDC-INFO) or  - Visit CDCs website at www.cdc.gov/vaccines. Vaccine Information Statement   Tdap (Tetanus, Diphtheria, Pertussis) Vaccine  8/6/2021  [address]ES[id number]   Department of Health and Human Services  Centers for Disease Control and Prevention    Office Use Only

## 2022-06-13 LAB — BACTERIA FLD CULT: ABNORMAL

## 2022-06-13 NOTE — PROGRESS NOTES
Please call patient's caregiver and advise   -the wound culture of her hand grew MRSA. The antibiotic I called in last week for her is appropriate to treat the infection. Continue the antibiotic for its complete course. Follow up as scheduled.   Iram You PA-C

## 2022-06-14 RX ORDER — MELATONIN
Qty: 30 TABLET | Refills: 11 | Status: SHIPPED | OUTPATIENT
Start: 2022-06-14 | End: 2022-08-30

## 2022-06-14 NOTE — PROGRESS NOTES
Attempt to reach caregiver unsuccessful. LVM for caregiver to [facility] to notify as per [facility].

## 2022-06-23 DIAGNOSIS — Z79.4 UNCONTROLLED TYPE 2 DIABETES MELLITUS WITH HYPERGLYCEMIA, WITH LONG-TERM CURRENT USE OF INSULIN (HCC): ICD-10-CM

## 2022-06-23 DIAGNOSIS — E11.65 UNCONTROLLED TYPE 2 DIABETES MELLITUS WITH HYPERGLYCEMIA, WITH LONG-TERM CURRENT USE OF INSULIN (HCC): ICD-10-CM

## 2022-06-23 RX ORDER — INSULIN GLARGINE 100 [IU]/ML
INJECTION, SOLUTION SUBCUTANEOUS
Qty: 9 ML | Refills: 11 | Status: SHIPPED | OUTPATIENT
Start: 2022-06-23 | End: 2022-08-30 | Stop reason: SDUPTHER

## 2022-07-19 ENCOUNTER — OFFICE VISIT (OUTPATIENT)
Dept: FAMILY MEDICINE CLINIC | Age: 37
End: 2022-07-19
Payer: MEDICAID

## 2022-07-19 VITALS
DIASTOLIC BLOOD PRESSURE: 72 MMHG | TEMPERATURE: 97.5 F | HEART RATE: 101 BPM | BODY MASS INDEX: 31.92 KG/M2 | WEIGHT: 187 LBS | OXYGEN SATURATION: 100 % | HEIGHT: 64 IN | SYSTOLIC BLOOD PRESSURE: 101 MMHG | RESPIRATION RATE: 18 BRPM

## 2022-07-19 DIAGNOSIS — R56.9 FOCAL SEIZURES (HCC): ICD-10-CM

## 2022-07-19 DIAGNOSIS — M79.641 PAIN OF RIGHT HAND: Primary | ICD-10-CM

## 2022-07-19 PROCEDURE — 99213 OFFICE O/P EST LOW 20 MIN: CPT | Performed by: FAMILY MEDICINE

## 2022-07-19 NOTE — PROGRESS NOTES
Chief Complaint   Patient presents with    Hand Injury     Right hand lacerations     1. Have you been to the ER, urgent care clinic since your last visit? Hospitalized since your last visit? No    2. Have you seen or consulted any other health care providers outside of the [address] [place] [address] since your last visit? Include any pap smears or colon screening. No      Lab Results   Component Value Date/Time    Microalb/Creat ratio (ug/mg creat.) 3 03/24/2021 03:20 PM      Chief Complaint   Patient presents with    Hand Injury     Right hand lacerations     she is a 43y.o. year old female who presents for evaluation. See Diabetic Report Card listed above. Patient Active Problem List    Diagnosis    F[facility] ([facility]    Type 2 diabetes mellitus with hyperglycemia, with long-term current use of insulin ([facility]    Non morbid obesity    BMI 34.0-34.9,adult    Mixed hyperlipidemia    Active autistic disorder    Hypovitaminosis D       Reviewed PmHx, RxHx, FmHx, SocHx, AllgHx--dated and updated in the chart. Review of Systems - negative except as listed above in the HPI    Objective:     Vitals:    07/19/22 1355   BP: 101/72   Pulse: (!) 101   Resp: 18   Temp: 97.5 °F (36.4 °C)   TempSrc: Temporal   SpO2: 100%   Weight: 187 lb (84.8 kg)   Height: 5' 4\" (1.626 m)         Assessment/ Plan:   Diagnoses and all orders for this visit:    1. Pain of right hand  -healed laceration repair    2.  Focal seizures ([facility]  -stable         Lab Results   Component Value Date/Time    Cholesterol, total 131 03/16/2022 09:41 AM    HDL Cholesterol 50 03/16/2022 09:41 AM    LDL,Direct 36 12/01/2020 05:00 PM    LDL, calculated 63 03/16/2022 09:41 AM    LDL, calculated 41 02/07/2019 04:02 PM    Triglyceride 96 03/16/2022 09:41 AM     Lab Results   Component Value Date/Time    Hemoglobin A1c 7.1 (H) 03/16/2022 09:41 AM    Hemoglobin A1c 7.1 (H) 11/03/2021 11:29 AM    Hemoglobin A1c 7.0 (H) 07/21/2021 04:00 PM Microalb/Creat ratio (ug/mg creat.) 3 03/24/2021 03:20 PM    LDL,Direct 36 12/01/2020 05:00 PM    LDL, calculated 63 03/16/2022 09:41 AM    LDL, calculated 41 02/07/2019 04:02 PM    Creatinine 0.72 03/16/2022 09:41 AM    Hemoglobin A1c, External 6.8 08/22/2019 12:00 AM    Hemoglobin A1c, External 7.5 12/16/2017 12:00 AM    Hemoglobin A1c, External 8.7 09/24/2015 12:00 AM          Discussed with patient goal of Diabetes to include:  HgA1C <7, LDL cholesterol <100, Blood pressure <140/80. Discussed with patient diet and weight management and to get regular exercise. Recommend yearly eye exams and daily foot care. The patient understands and agrees with the plan. I have discussed the diagnosis with the patient and the intended plan as seen in the above orders. The patient has received an after-visit summary and questions were answered concerning future plans. Medication Side Effects and Warnings were discussed with patient  Patient Labs were reviewed and or requested  Patient Past Records were reviewed and or requested    Samuel Yusuf M.D. [address]    There are no Patient Instructions on file for this visit.

## 2022-07-27 DIAGNOSIS — E11.65 TYPE 2 DIABETES MELLITUS WITH HYPERGLYCEMIA, WITH LONG-TERM CURRENT USE OF INSULIN (HCC): ICD-10-CM

## 2022-07-27 DIAGNOSIS — Z79.4 TYPE 2 DIABETES MELLITUS WITH HYPERGLYCEMIA, WITH LONG-TERM CURRENT USE OF INSULIN (HCC): ICD-10-CM

## 2022-07-27 RX ORDER — BLOOD SUGAR DIAGNOSTIC
STRIP MISCELLANEOUS
Qty: 100 STRIP | Refills: 12 | Status: SHIPPED | OUTPATIENT
Start: 2022-07-27 | End: 2022-08-30 | Stop reason: SDUPTHER

## 2022-08-04 ENCOUNTER — OFFICE VISIT (OUTPATIENT)
Dept: FAMILY MEDICINE CLINIC | Age: 37
End: 2022-08-04
Payer: MEDICAID

## 2022-08-04 DIAGNOSIS — N92.6 IRREGULAR MENSES: Primary | ICD-10-CM

## 2022-08-04 PROCEDURE — 96372 THER/PROPH/DIAG INJ SC/IM: CPT | Performed by: NURSE PRACTITIONER

## 2022-08-04 RX ORDER — MEDROXYPROGESTERONE ACETATE 150 MG/ML
150 INJECTION, SUSPENSION INTRAMUSCULAR ONCE
Status: COMPLETED | OUTPATIENT
Start: 2022-08-04 | End: 2022-08-04

## 2022-08-04 RX ADMIN — MEDROXYPROGESTERONE ACETATE 150 MG: 150 INJECTION, SUSPENSION INTRAMUSCULAR at 17:03

## 2022-08-04 NOTE — PROGRESS NOTES
Chief Complaint   Patient presents with    Injection     depo        After obtaining [facility]'s consent, and per orders of greg, injection of depo given by Haley Wallace in (R) delt. Patient instructed to remain in clinic for 20 minutes afterwards, and to report any adverse reaction to me immediately. Patient did not display any adverse side effects. Patient was advsied to return in 3 month(s).  Pt received dates to return     Pt has no other concerns

## 2022-08-09 ENCOUNTER — OFFICE VISIT (OUTPATIENT)
Dept: FAMILY MEDICINE CLINIC | Age: 37
End: 2022-08-09
Payer: MEDICAID

## 2022-08-09 VITALS
DIASTOLIC BLOOD PRESSURE: 76 MMHG | RESPIRATION RATE: 16 BRPM | SYSTOLIC BLOOD PRESSURE: 108 MMHG | HEIGHT: 64 IN | TEMPERATURE: 97.5 F | OXYGEN SATURATION: 100 % | WEIGHT: 187 LBS | BODY MASS INDEX: 31.92 KG/M2 | HEART RATE: 104 BPM

## 2022-08-09 DIAGNOSIS — E11.65 TYPE 2 DIABETES MELLITUS WITH HYPERGLYCEMIA, WITH LONG-TERM CURRENT USE OF INSULIN (HCC): Primary | ICD-10-CM

## 2022-08-09 DIAGNOSIS — E78.2 MIXED HYPERLIPIDEMIA: ICD-10-CM

## 2022-08-09 DIAGNOSIS — R56.9 FOCAL SEIZURES (HCC): ICD-10-CM

## 2022-08-09 DIAGNOSIS — Z79.4 TYPE 2 DIABETES MELLITUS WITH HYPERGLYCEMIA, WITH LONG-TERM CURRENT USE OF INSULIN (HCC): Primary | ICD-10-CM

## 2022-08-09 PROCEDURE — 99214 OFFICE O/P EST MOD 30 MIN: CPT | Performed by: FAMILY MEDICINE

## 2022-08-09 PROCEDURE — 3051F HG A1C>EQUAL 7.0%<8.0%: CPT | Performed by: FAMILY MEDICINE

## 2022-08-09 NOTE — PROGRESS NOTES
Chief Complaint   Patient presents with    Annual Wellness Visit     NON Verbal GH: Active autistic disorder    Diabetes     Dr Crista Davis     NON Fasting, A1c, BMP     1. Have you been to the ER, urgent care clinic since your last visit? Hospitalized since your last visit? No    2. Have you seen or consulted any other health care providers outside of the [address] [place] [address] since your last visit? Include any pap smears or colon screening. Yes Where: Dr Ankit Rose    Lab Results   Component Value Date/Time    Microalb/Creat ratio (ug/mg creat.) 3 03/24/2021 03:20 PM      Chief Complaint   Patient presents with    Annual Wellness Visit     NON Verbal GH: Active autistic disorder    Diabetes     Dr Crista Davis     NON Fasting, A1c, BMP     she is a 43y.o. year old female who presents for evaluation. See Diabetic Report Card listed above. Patient Active Problem List    Diagnosis    Focal seizures ([address]    Type 2 diabetes mellitus with hyperglycemia, with long-term current use of insulin ([address]    Non morbid obesity    BMI 34.0-34.9,adult    Mixed hyperlipidemia    Active autistic disorder    Hypovitaminosis D       Reviewed PmHx, RxHx, FmHx, SocHx, AllgHx--dated and updated in the chart. Review of Systems - negative except as listed above in the HPI    Objective:     Vitals:    08/09/22 1335   BP: 108/76   Pulse: (!) 104   Resp: 16   Temp: 97.5 °F (36.4 °C)   TempSrc: Temporal   SpO2: 100%   Weight: 187 lb (84.8 kg)   Height: 5' 4\" (1.626 m)         Assessment/ Plan:   Diagnoses and all orders for this visit:    1. Type 2 diabetes mellitus with hyperglycemia, with long-term current use of insulin ([facility])  -     HEMOGLOBIN A1C WITH EAG; Future  -     METABOLIC PANEL, COMPREHENSIVE; Future  -just above goal, labs sent to Endo    2. Mixed hyperlipidemia  -non fasting    3.  Focal seizures ([address]  -not actve         Lab Results   Component Value Date/Time    Cholesterol, total 131 03/16/2022 09:41 AM HDL Cholesterol 50 03/16/2022 09:41 AM    LDL,Direct 36 12/01/2020 05:00 PM    LDL, calculated 63 03/16/2022 09:41 AM    LDL, calculated 41 02/07/2019 04:02 PM    Triglyceride 96 03/16/2022 09:41 AM     Lab Results   Component Value Date/Time    Hemoglobin A1c 7.1 (H) 03/16/2022 09:41 AM    Hemoglobin A1c 7.1 (H) 11/03/2021 11:29 AM    Hemoglobin A1c 7.0 (H) 07/21/2021 04:00 PM    Microalb/Creat ratio (ug/mg creat.) 3 03/24/2021 03:20 PM    LDL,Direct 36 12/01/2020 05:00 PM    LDL, calculated 63 03/16/2022 09:41 AM    LDL, calculated 41 02/07/2019 04:02 PM    Creatinine 0.72 03/16/2022 09:41 AM    Hemoglobin A1c, External 6.8 08/22/2019 12:00 AM    Hemoglobin A1c, External 7.5 12/16/2017 12:00 AM    Hemoglobin A1c, External 8.7 09/24/2015 12:00 AM          Discussed with patient goal of Diabetes to include:  HgA1C <7, LDL cholesterol <100, Blood pressure <140/80. Discussed with patient diet and weight management and to get regular exercise. Recommend yearly eye exams and daily foot care. The patient understands and agrees with the plan. I have discussed the diagnosis with the patient and the intended plan as seen in the above orders. The patient has received an after-visit summary and questions were answered concerning future plans. Medication Side Effects and Warnings were discussed with patient  Patient Labs were reviewed and or requested  Patient Past Records were reviewed and or requested    Endy Pike M.D. [facility]    There are no Patient Instructions on file for this visit.

## 2022-08-09 NOTE — PROGRESS NOTES
Chief Complaint   Patient presents with    Annual Wellness Visit     NON Verbal GH: Active autistic disorder    Diabetes     Dr Jose Carlos Leiva     NON Fasting, A1c, BMP     1. Have you been to the ER, urgent care clinic since your last visit? Hospitalized since your last visit? No    2. Have you seen or consulted any other health care providers outside of the [address] [place] [address] since your last visit? Include any pap smears or colon screening.  Yes Where: Dr Fall Likes

## 2022-08-10 DIAGNOSIS — Z79.4 UNCONTROLLED TYPE 2 DIABETES MELLITUS WITH HYPERGLYCEMIA, WITH LONG-TERM CURRENT USE OF INSULIN (HCC): ICD-10-CM

## 2022-08-10 DIAGNOSIS — E11.65 UNCONTROLLED TYPE 2 DIABETES MELLITUS WITH HYPERGLYCEMIA, WITH LONG-TERM CURRENT USE OF INSULIN (HCC): ICD-10-CM

## 2022-08-10 LAB
ALBUMIN SERPL-MCNC: 3.4 G/DL (ref 3.5–5)
ALBUMIN/GLOB SERPL: 1 {RATIO} (ref 1.1–2.2)
ALP SERPL-CCNC: 72 U/L (ref 45–117)
ALT SERPL-CCNC: 16 U/L (ref 12–78)
ANION GAP SERPL CALC-SCNC: 17 MMOL/L (ref 5–15)
AST SERPL-CCNC: 11 U/L (ref 15–37)
BILIRUB SERPL-MCNC: 0.2 MG/DL (ref 0.2–1)
BUN SERPL-MCNC: 10 MG/DL (ref 6–20)
BUN/CREAT SERPL: 10 (ref 12–20)
CALCIUM SERPL-MCNC: 9.6 MG/DL (ref 8.5–10.1)
CHLORIDE SERPL-SCNC: 101 MMOL/L (ref 97–108)
CO2 SERPL-SCNC: 20 MMOL/L (ref 21–32)
CREAT SERPL-MCNC: 0.96 MG/DL (ref 0.55–1.02)
EST. AVERAGE GLUCOSE BLD GHB EST-MCNC: 177 MG/DL
GLOBULIN SER CALC-MCNC: 3.5 G/DL (ref 2–4)
GLUCOSE SERPL-MCNC: 271 MG/DL (ref 65–100)
HBA1C MFR BLD: 7.8 % (ref 4–5.6)
POTASSIUM SERPL-SCNC: 4 MMOL/L (ref 3.5–5.1)
PROT SERPL-MCNC: 6.9 G/DL (ref 6.4–8.2)
SODIUM SERPL-SCNC: 138 MMOL/L (ref 136–145)

## 2022-08-10 RX ORDER — PEN NEEDLE, DIABETIC 29 G X1/2"
NEEDLE, DISPOSABLE MISCELLANEOUS
Qty: 300 PEN NEEDLE | Refills: 3 | Status: SHIPPED | OUTPATIENT
Start: 2022-08-10 | End: 2022-08-30 | Stop reason: SDUPTHER

## 2022-08-15 NOTE — PROGRESS NOTES
Please contact patient regarding their mychart resulted labs. They have not reviewed them to date.       Dr. Jeff Kirkpatrick

## 2022-08-30 ENCOUNTER — OFFICE VISIT (OUTPATIENT)
Dept: ENDOCRINOLOGY | Age: 37
End: 2022-08-30
Payer: MEDICAID

## 2022-08-30 VITALS
HEIGHT: 64 IN | HEART RATE: 106 BPM | DIASTOLIC BLOOD PRESSURE: 70 MMHG | SYSTOLIC BLOOD PRESSURE: 99 MMHG | OXYGEN SATURATION: 100 % | TEMPERATURE: 98 F | BODY MASS INDEX: 32.44 KG/M2 | WEIGHT: 190 LBS

## 2022-08-30 DIAGNOSIS — Z79.4 UNCONTROLLED TYPE 2 DIABETES MELLITUS WITH HYPERGLYCEMIA, WITH LONG-TERM CURRENT USE OF INSULIN (HCC): ICD-10-CM

## 2022-08-30 DIAGNOSIS — Z79.4 TYPE 2 DIABETES MELLITUS WITH HYPERGLYCEMIA, WITH LONG-TERM CURRENT USE OF INSULIN (HCC): ICD-10-CM

## 2022-08-30 DIAGNOSIS — E78.2 MIXED HYPERLIPIDEMIA: ICD-10-CM

## 2022-08-30 DIAGNOSIS — E11.65 UNCONTROLLED TYPE 2 DIABETES MELLITUS WITH HYPERGLYCEMIA, WITH LONG-TERM CURRENT USE OF INSULIN (HCC): ICD-10-CM

## 2022-08-30 DIAGNOSIS — E11.65 TYPE 2 DIABETES MELLITUS WITH HYPERGLYCEMIA, UNSPECIFIED WHETHER LONG TERM INSULIN USE (HCC): Primary | ICD-10-CM

## 2022-08-30 DIAGNOSIS — E11.65 TYPE 2 DIABETES MELLITUS WITH HYPERGLYCEMIA, WITH LONG-TERM CURRENT USE OF INSULIN (HCC): ICD-10-CM

## 2022-08-30 PROCEDURE — 99215 OFFICE O/P EST HI 40 MIN: CPT | Performed by: INTERNAL MEDICINE

## 2022-08-30 PROCEDURE — 3051F HG A1C>EQUAL 7.0%<8.0%: CPT | Performed by: INTERNAL MEDICINE

## 2022-08-30 RX ORDER — PEN NEEDLE, DIABETIC 29 G X1/2"
NEEDLE, DISPOSABLE MISCELLANEOUS
Qty: 400 PEN NEEDLE | Refills: 3 | Status: SHIPPED | OUTPATIENT
Start: 2022-08-30

## 2022-08-30 RX ORDER — INSULIN GLARGINE 100 [IU]/ML
24 INJECTION, SOLUTION SUBCUTANEOUS
Qty: 15 ML | Refills: 11 | Status: SHIPPED | OUTPATIENT
Start: 2022-08-30

## 2022-08-30 RX ORDER — INSULIN LISPRO 100 [IU]/ML
INJECTION, SOLUTION INTRAVENOUS; SUBCUTANEOUS
Qty: 15 ML | Refills: 11 | Status: SHIPPED | OUTPATIENT
Start: 2022-08-30

## 2022-08-30 RX ORDER — BLOOD SUGAR DIAGNOSTIC
STRIP MISCELLANEOUS
Qty: 200 STRIP | Refills: 11 | Status: SHIPPED | OUTPATIENT
Start: 2022-08-30

## 2022-08-30 RX ORDER — DIVALPROEX SODIUM 500 MG/1
TABLET, EXTENDED RELEASE ORAL
COMMUNITY
Start: 2022-08-01

## 2022-08-30 RX ORDER — DIVALPROEX SODIUM 250 MG/1
TABLET, EXTENDED RELEASE ORAL
COMMUNITY
Start: 2022-08-01

## 2022-08-30 RX ORDER — BLOOD-GLUCOSE CONTROL, NORMAL
EACH MISCELLANEOUS
Qty: 200 LANCET | Refills: 11 | Status: SHIPPED | OUTPATIENT
Start: 2022-08-30

## 2022-08-30 RX ORDER — METFORMIN HYDROCHLORIDE 1000 MG/1
1000 TABLET ORAL 2 TIMES DAILY WITH MEALS
Qty: 60 TABLET | Refills: 11 | Status: SHIPPED | OUTPATIENT
Start: 2022-08-30

## 2022-08-30 RX ORDER — CHOLECALCIFEROL (VITAMIN D3) 25 MCG
TABLET ORAL
COMMUNITY
Start: 2022-08-01

## 2022-08-30 NOTE — TELEPHONE ENCOUNTER
Pt care giver states [phone] is the best number to reach Timbo Pritchett. Please advise if need more info.  CAROLINA

## 2022-08-30 NOTE — PROGRESS NOTES
Elizabeth Santos MD             Patient Information  Date:8/30/2022  Name : Kierra Fine 43 y.o.     YOB: 1985         Referred by:  [facility]   Director Windy Chase - [phone] /[phone]        Chief Complaint   Patient presents with    Follow-up    Diabetes       History of Present Illness: Kierra Fine is a 43 y.o. female here for fu of  Type 2 Diabetes Mellitus. Type 2 diabetes mellitus: On MDI  She is here with her caregiver  Blood glucose at the [facility] varies a lot, especially at bedtime when there is a certain nurse/caretaker consistently blood glucose is less than 200, then there are days where there are other caretakers and blood glucose at bedtime is 300s and 400s. Discussed with the caregiver either she is given more starchy food or snacks or insulin was not appropriately given which can cause wide fluctuation within 24 hours. On insulin, reviewed MAR    Prior history  Reviewed the log, blood glucose at 300s after coming back from the day support, which is due to sweet tea and sandwich at [facility] , some unexplained, she was seen drinking chocolate syrup from the fridge, sneaking food and some of the blood glucose may not be really fasting  She constantly asks for food    Prior history    Reviewed the log, blood glucose lately has been high, mostly between lunch and dinner and there is a pattern which is noticed when one of the staff member was at work which is when the blood glucose would go from 100 before lunch to more than 300 at dinnertime.   This pattern was not observed in the prior months   There is a concern that staff member might be giving patient something sweet to eat to calm her down      Prior history  She did not show up for 3 appointments, discussed with the /nursing home manager the importance of follow-up in the past.  After the last visit again did not show for the appointment. Discussed with the assisted living supervisor again. She is on basal bolus regimen  No hypoglycemia, no hospitalization      Prior history  Fasting hyperglycemia and there are certain days where there are unexplained hyperglycemia   the only way to calm her is to give the  food is what I have learned and I suspect some of the staff members are giving her snacks whenever she is agitated. She lives in a Group home due to underlying Psychiatric condition     Here with care taker , does not communicate  Monitoring frequency:3 /day         Wt Readings from Last 3 Encounters:   08/30/22 190 lb (86.2 kg)   08/09/22 187 lb (84.8 kg)   07/19/22 187 lb (84.8 kg)       BP Readings from Last 3 Encounters:   08/30/22 99/70   08/09/22 108/76   07/19/22 101/72           Past Medical History:   Diagnosis Date    Anxiety     Autism     Depression     Diabetes (HCC)     Intermittent explosive disorder     Seizures (HCC)      Current Outpatient Medications   Medication Sig    divalproex ER (DEPAKOTE ER) 500 mg ER tablet     divalproex ER (DEPAKOTE ER) 250 mg ER tablet     Vitamin D3 25 mcg (1,000 unit) tablet     insulin needles, disposable, (Pentips) 29 gauge x 1/2\" ndle USE TO INJECT INSULIN 3 times daily. Dx code E11.65    glucose blood VI test strips (OneTouch Verio test strips) strip USE TO TEST BLOOD SUGARS 4 TIMES A DAY    Lantus Solostar U-100 Insulin 100 unit/mL (3 mL) inpn INJECT 24 UNITS UNDER THE SKIN AT BEDTIME    busPIRone (BUSPAR) 30 mg tablet     lancets (TRUEplus Lancets) 30 gauge misc USE TO CHECK BLOOD SUGAR 4 TIMES A DAY    glucose 4 gram chewable tablet CHEW & SWALLOW 3 TABLETS BY MOUTH IF BLOOD GLUCOSE LESS THAN 70, WAIT15 MINUTES & RECHECK. REPEAT 1 TIME IF NEEDED.  CALL 911 IF NO RESPONSE    simvastatin (ZOCOR) 10 mg tablet TAKE 1 TABLET BY MOUTH AT BEDTIME FOR CHOLESTEROL    cetirizine (ZYRTEC) 10 mg tablet TAKE 1 TABLET BY MOUTH DAILY FOR ALLERGIES    lubiPROStone (AMITIZA) 24 mcg capsule TAKE 1 CAPSULE BY MOUTH TWICE DAILY WITH MEALS    metFORMIN (GLUCOPHAGE) 1,000 mg tablet TAKE 1 TABLET BY MOUTH IN THE MORNING AND IN THE EVENING WITH FOOD    insulin lispro (HumaLOG KwikPen Insulin) 100 unit/mL kwikpen INJECT 10 units UNDER THE SKIN BEFORE BREAKFAST AND BEFORE LUNCH + SLIDING SCALE 150-200:2UNIT,201-250: 4UNIT,251-300:6UNIT, 301- 350:8UNIT,351-400:10UNIT INJECT 4 units UNDER THE SKIN BEFORE DINNER +    metoprolol succinate (TOPROL-XL) 25 mg XL tablet TAKE 1 TABLET BY MOUTH DAILY    Calcium Antacid 200 mg calcium (500 mg) chew TAKE 1 TABLET BY MOUTH TWICE A DAY    OneTouch Verio Meter misc USE TO TEST BLOOD SUGAR THREE TIMES DAILY    medroxyPROGESTERone (DEPO-PROVERA) 150 mg/mL syrg INJECT EVERY 3 MONTHS    Blood-Glucose Meter (PRODIGY AUTOCODE METER) monitoring kit Test TID Dx Code: E11.65    LORazepam (ATIVAN) 1 mg tablet Take 1 mg by mouth two (2) times a day. risperiDONE (RISPERDAL) 4 mg tablet Take 4 mg by mouth two (2) times a day. cholecalciferol (VITAMIN D3) (1000 Units /25 mcg) tablet TAKE 1 TABLET BY MOUTH DAILY (Patient not taking: Reported on 8/30/2022)    LORazepam (ATIVAN) 2 mg tablet Take 2 mg by mouth as needed. (Patient not taking: Reported on 8/30/2022)    bacitracin zinc (BACITRACIN) ointment Apply  to affected area two (2) times a day. (Patient not taking: Reported on 8/30/2022)    divalproex DR (DEPAKOTE) 500 mg tablet Take 1,000 mg by mouth nightly. (Patient not taking: Reported on 8/30/2022)    ammonium lactate (LAC-HYDRIN) 12 % lotion Apply  to affected area two (2) times a day. rub in to affected area well  (Patient not taking: Reported on 8/30/2022)    divalproex DR (DEPAKOTE) 250 mg tablet Take 250 mg by mouth daily. And 1000 mg QHS (Patient not taking: Reported on 8/30/2022)     No current facility-administered medications for this visit.      Allergies   Allergen Reactions    Other Plant, Animal, Environmental Sneezing         Review of Systems: unable to obtain due to underlying mental status      Physical Examination:   Blood pressure 99/70, pulse (!) 106, temperature 98 °F (36.7 °C), temperature source Temporal, height 5' 4\" (1.626 m), weight 190 lb (86.2 kg), SpO2 100 %. Estimated body mass index is 32.61 kg/m² as calculated from the following:    Height as of this encounter: 5' 4\" (1.626 m). Weight as of this encounter: 190 lb (86.2 kg). General: pleasant, no distress, good eye contact  HEENT: no exophthalmos, no periorbital edema, EOMI  Neck: No thyromegaly  CVS: S1-S2 regular  RS: Normal respiratory effort  Musculoskeletal: no tremors    Skin: Normal color        History of callus          Lab Results   Component Value Date/Time    Hemoglobin A1c 7.8 (H) 08/09/2022 02:08 PM    Hemoglobin A1c 7.1 (H) 03/16/2022 09:41 AM    Hemoglobin A1c 7.1 (H) 11/03/2021 11:29 AM    Glucose 271 (H) 08/09/2022 02:08 PM    Glucose  10/10/2018 02:01 PM    Microalb/Creat ratio (ug/mg creat.) 3 03/24/2021 03:20 PM    LDL,Direct 36 12/01/2020 05:00 PM    LDL, calculated 63 03/16/2022 09:41 AM    LDL, calculated 41 02/07/2019 04:02 PM    Creatinine 0.96 08/09/2022 02:08 PM    Hemoglobin A1c, External 6.8 08/22/2019 12:00 AM    Hemoglobin A1c, External 7.5 12/16/2017 12:00 AM    Hemoglobin A1c, External 8.7 09/24/2015 12:00 AM      Lab Results   Component Value Date/Time    GFR est AA >60 08/09/2022 02:08 PM    GFR est non-AA >60 08/09/2022 02:08 PM    Creatinine 0.96 08/09/2022 02:08 PM    BUN 10 08/09/2022 02:08 PM    Sodium 138 08/09/2022 02:08 PM    Potassium 4.0 08/09/2022 02:08 PM    Chloride 101 08/09/2022 02:08 PM    CO2 20 (L) 08/09/2022 02:08 PM        Assessment/Plan:     No diagnosis found.       1. Type 2 Diabetes Mellitus   Lab Results   Component Value Date/Time    Hemoglobin A1c 7.8 (H) 08/09/2022 02:08 PM    Hemoglobin A1c (POC) 7.7 10/10/2018 02:10 PM    Hemoglobin A1c, External 6.8 08/22/2019 12:00 AM     Lantus 24 units at bedtime  Metformin  Novolog or Humalog with meals    Stressed the compliance with the diet, low-carb diet,. Due to increase in her psychotropic medications she is also hungry and constantly asking for the food, reportedly gets agitated when she does not get something to eat. She is sneaking food which is causing hyperglycemia. It is a challenge to control blood glucose in these situations. Also blood glucose are better when there is a certain caregiver (Susie Spann), rest of the time bedtime blood glucose are really high, either missed insulin or giving her starchy food and do that. This pattern was seen last time also, this was discussed with the caregiver and to discuss with the director     Called the facility to discuss with the director, left a message with the     2. HTN :  cannot tolerate ACE    3. Hyperlipidemia : Continue statin. 4.Obesity:Body mass index is 32.61 kg/m². Diet managed by Group home      Caregiver verbalized understanding  Spent > 40 minutes on the day of the visit reviewing chart, examining, ordering/reviewing labs, counseling, discussing therapeutics and documentation in the medical record    There are no Patient Instructions on file for this visit. Thank you for allowing me to participate in the care of this patient.     Melani Estrella MD

## 2022-08-30 NOTE — PROGRESS NOTES
1. Have you been to the ER, urgent care clinic since your last visit? Hospitalized since your last visit? No    2. Have you seen or consulted any other health care providers outside of the [address] [place] [address] since your last visit? Include any pap smears or colon screening.  No  Chief Complaint   Patient presents with    Follow-up    Diabetes     Visit Vitals  BP 99/70 (BP 1 Location: Left upper arm, BP Patient Position: Sitting, BP Cuff Size: Large adult)   Pulse (!) 106   Temp 98 °F (36.7 °C) (Temporal)   Ht 5' 4\" (1.626 m)   Wt 190 lb (86.2 kg)   SpO2 100%   BMI 32.61 kg/m²

## 2022-08-30 NOTE — LETTER
9/1/2022    Patient: Shelly Barahona   YOB: 1985   Date of Visit: 8/30/2022     Jennifer Rodarte, [address] [facility]  V[address] In [place]    Dear Jennifer Rodarte MD,      Thank you for referring Ms. Mohnii Rice to [id number] [place] [address] for evaluation. My notes for this consultation are attached. If you have questions, please do not hesitate to call me. I look forward to following your patient along with you.       Sincerely,    Mingo Das MD

## 2022-08-30 NOTE — PATIENT INSTRUCTIONS
Check blood sugars before meals and at bedtime. Maintain the log and bring it all your appointments, Please send the [facility]       No insulin  If glucose is less than 70     If she has low glucose which is less than 70 - give her 4 oz juice and recheck every 15 minutes until it is more than 100     If the bedtime sugars are less than 100 ,give a 15 gm snack.  And recheck until it is >100    1800 Kcal diet , low carb , high protein    Avoid juices and sodas     Lantus insulin 24 units at bed time     Novolog or Humalog 10 units before breakfast , 10 units before  lunch and 4 units before dinner   No humalog or Novolog at bedtime        Additional Novolog or Humalog for high sugars with meals     150-200 mg   Add 2 units     201-250 mg   Add 4  units     251-300 mg   Add 6 units     301-350 mg   Add 8 units     351-400 mg   Add 10 units         If sugars are more than 450 then take her to hospital

## 2022-09-16 RX ORDER — MEDROXYPROGESTERONE ACETATE 150 MG/ML
INJECTION, SUSPENSION INTRAMUSCULAR
Qty: 1 ML | Refills: 11 | Status: SHIPPED | OUTPATIENT
Start: 2022-09-16

## 2022-10-12 RX ORDER — CALCIUM CARBONATE 500 MG/1
TABLET, CHEWABLE ORAL
Qty: 62 TABLET | Refills: 12 | Status: SHIPPED | OUTPATIENT
Start: 2022-10-12

## 2022-10-20 ENCOUNTER — OFFICE VISIT (OUTPATIENT)
Dept: FAMILY MEDICINE CLINIC | Age: 37
End: 2022-10-20
Payer: MEDICAID

## 2022-10-20 VITALS — BODY MASS INDEX: 32.78 KG/M2 | WEIGHT: 192 LBS | HEIGHT: 64 IN

## 2022-10-20 DIAGNOSIS — N92.6 IRREGULAR MENSES: Primary | ICD-10-CM

## 2022-10-20 PROCEDURE — 96372 THER/PROPH/DIAG INJ SC/IM: CPT | Performed by: NURSE PRACTITIONER

## 2022-10-20 RX ORDER — MEDROXYPROGESTERONE ACETATE 150 MG/ML
150 INJECTION, SUSPENSION INTRAMUSCULAR ONCE
Qty: 1 ML | Refills: 0
Start: 2022-10-20 | End: 2022-10-20

## 2022-10-20 NOTE — PROGRESS NOTES
Patient here for 150 mg depo injection. Right deltoid.   FKY:[id number] ex:05/01/2024  Staff member informed next injection 1/5/2023-1/14/2023

## 2023-01-16 DIAGNOSIS — I10 ESSENTIAL HYPERTENSION: ICD-10-CM

## 2023-01-16 DIAGNOSIS — R00.0 TACHYCARDIA: ICD-10-CM

## 2023-01-16 RX ORDER — METOPROLOL SUCCINATE 25 MG/1
TABLET, EXTENDED RELEASE ORAL
Qty: 31 TABLET | Refills: 12 | Status: SHIPPED | OUTPATIENT
Start: 2023-01-16

## 2023-04-25 ENCOUNTER — OFFICE VISIT (OUTPATIENT)
Dept: ENDOCRINOLOGY | Age: 38
End: 2023-04-25
Payer: MEDICAID

## 2023-04-25 VITALS
RESPIRATION RATE: 18 BRPM | WEIGHT: 187 LBS | HEART RATE: 99 BPM | OXYGEN SATURATION: 100 % | TEMPERATURE: 98.1 F | DIASTOLIC BLOOD PRESSURE: 74 MMHG | HEIGHT: 64 IN | BODY MASS INDEX: 31.92 KG/M2 | SYSTOLIC BLOOD PRESSURE: 109 MMHG

## 2023-04-25 DIAGNOSIS — Z79.4 TYPE 2 DIABETES MELLITUS WITH HYPERGLYCEMIA, WITH LONG-TERM CURRENT USE OF INSULIN (HCC): Primary | ICD-10-CM

## 2023-04-25 DIAGNOSIS — E66.9 NON MORBID OBESITY: ICD-10-CM

## 2023-04-25 DIAGNOSIS — E11.65 TYPE 2 DIABETES MELLITUS WITH HYPERGLYCEMIA, WITH LONG-TERM CURRENT USE OF INSULIN (HCC): Primary | ICD-10-CM

## 2023-04-25 DIAGNOSIS — E78.2 MIXED HYPERLIPIDEMIA: ICD-10-CM

## 2023-04-25 PROCEDURE — 99214 OFFICE O/P EST MOD 30 MIN: CPT | Performed by: INTERNAL MEDICINE

## 2023-04-25 NOTE — PROGRESS NOTES
Love Cooney MD             Patient Information  Date:4/25/2023  Name : Martha Schwartz 42 y.o.     YOB: 1985         Referred by:  Group home   Director Hussain Cruz - [phone] /[phone]        Chief Complaint   Patient presents with    Diabetes       History of Present Illness: Martha Schwartz is a 42 y.o. female here for fu of  Type 2 Diabetes Mellitus. Type 2 diabetes mellitus: On MDI  She is here with her caregiver  Blood glucose at the group home varies a lot, especially at bedtime when there is a certain nurse/caretaker consistently blood glucose is less than 200, then there are days where there are other caretakers and blood glucose at bedtime is 300s and 400s. Discussed with the caregiver either she is given more starchy food or snacks or insulin was not appropriately given which can cause wide fluctuation within 24 hours. On insulin, reviewed MAR    Prior history  Reviewed the log, blood glucose at 300s after coming back from the day support, which is due to sweet tea and sandwich at [facility] , some unexplained, she was seen drinking chocolate syrup from the fridge, sneaking food and some of the blood glucose may not be really fasting  She constantly asks for food    Prior history    Reviewed the log, blood glucose lately has been high, mostly between lunch and dinner and there is a pattern which is noticed when one of the staff member was at work which is when the blood glucose would go from 100 before lunch to more than 300 at dinnertime.   This pattern was not observed in the prior months   There is a concern that staff member might be giving patient something sweet to eat to calm her down      Prior history  She did not show up for 3 appointments, discussed with the /nursing home manager the importance of follow-up in the past.  After the last visit again did not show for the appointment. Discussed with the assisted living supervisor again. She is on basal bolus regimen  No hypoglycemia, no hospitalization      Prior history  Fasting hyperglycemia and there are certain days where there are unexplained hyperglycemia   the only way to calm her is to give the  food is what I have learned and I suspect some of the staff members are giving her snacks whenever she is agitated. She lives in a Group home due to underlying Psychiatric condition     Here with care taker , does not communicate  Monitoring frequency:3 /day         Wt Readings from Last 3 Encounters:   04/25/23 187 lb (84.8 kg)   10/20/22 192 lb (87.1 kg)   08/30/22 190 lb (86.2 kg)       BP Readings from Last 3 Encounters:   04/25/23 109/74   08/30/22 99/70   08/09/22 108/76           Past Medical History:   Diagnosis Date    Anxiety     Autism     Depression     Diabetes (HCC)     Intermittent explosive disorder     Seizures (HCC)      Current Outpatient Medications   Medication Sig    simvastatin (ZOCOR) 10 mg tablet TAKE 1 TABLET BY MOUTH AT BEDTIME FOR CHOLESTEROL    cetirizine (ZYRTEC) 10 mg tablet TAKE 1 TABLET BY MOUTH DAILY FOR ALLERGIES    lubiPROStone (AMITIZA) 24 mcg capsule TAKE 1 CAPSULE BY MOUTH TWICE DAILY WITH MEALS    metoprolol succinate (TOPROL-XL) 25 mg XL tablet TAKE 1 TABLET BY MOUTH DAILY    Calcium Antacid 200 mg calcium (500 mg) chew TAKE 1 TABLET BY MOUTH TWICE A DAY    medroxyPROGESTERone (DEPO-PROVERA) 150 mg/mL syrg INJECT INTO THE MUSCLE EVERY 3 MONTHS    divalproex ER (DEPAKOTE ER) 500 mg ER tablet Take 2 Tablets by mouth nightly. divalproex ER (DEPAKOTE ER) 250 mg ER tablet Take 1 Tablet by mouth daily. Vitamin D3 25 mcg (1,000 unit) tablet Take 1 Tablet by mouth daily. glucose blood VI test strips (OneTouch Verio test strips) strip USE TO TEST BLOOD SUGARS 4 TIMES A DAY. Dx code: E11.65    lancets (TRUEplus Lancets) 30 gauge misc USE TO CHECK BLOOD SUGAR 4 TIMES A DAY.  Dx code: E11.65    insulin needles, disposable, (Pentips) 29 gauge x 1/2\" ndle USE TO INJECT INSULIN 4 times daily. Dx code E11.65    metFORMIN (GLUCOPHAGE) 1,000 mg tablet Take 1 Tablet by mouth two (2) times daily (with meals). insulin glargine (Lantus Solostar U-100 Insulin) 100 unit/mL (3 mL) inpn 24 Units by SubCUTAneous route nightly. insulin lispro (HumaLOG KwikPen Insulin) 100 unit/mL kwikpen INJECT 10 units UNDER THE SKIN BEFORE BREAKFAST AND BEFORE LUNCH AND 4 UNITS BEFORE DINNER. + SLIDING SCALE 150-200:2UNIT,201-250: 4UNIT,251-300:6UNIT, 301- 350:8UNIT,351-400:10UNIT. MAX UNITS DAILY: 50    busPIRone (BUSPAR) 30 mg tablet Take 1 Tablet by mouth two (2) times a day. glucose 4 gram chewable tablet CHEW & SWALLOW 3 TABLETS BY MOUTH IF BLOOD GLUCOSE LESS THAN 70, WAIT15 MINUTES & RECHECK. REPEAT 1 TIME IF NEEDED. CALL 911 IF NO RESPONSE    OneTouch Verio Meter misc USE TO TEST BLOOD SUGAR THREE TIMES DAILY    Blood-Glucose Meter (PRODIGY AUTOCODE METER) monitoring kit Test TID Dx Code: E11.65    LORazepam (ATIVAN) 1 mg tablet Take 1 Tablet by mouth two (2) times a day. And 2 mg PRN    risperiDONE (RISPERDAL) 4 mg tablet Take 1 Tablet by mouth two (2) times a day. No current facility-administered medications for this visit. Allergies   Allergen Reactions    Other Plant, Animal, Environmental Sneezing         Review of Systems: unable to obtain due to underlying mental status      Physical Examination:   Blood pressure 109/74, pulse 99, temperature 98.1 °F (36.7 °C), temperature source Temporal, resp. rate 18, height 5' 4\" (1.626 m), weight 187 lb (84.8 kg), SpO2 100 %. Estimated body mass index is 32.1 kg/m² as calculated from the following:    Height as of this encounter: 5' 4\" (1.626 m). Weight as of this encounter: 187 lb (84.8 kg).   General: pleasant, no distress, good eye contact  HEENT: no exophthalmos, no periorbital edema, EOMI  Neck: No thyromegaly  CVS: S1-S2 regular  RS: Normal respiratory effort  Musculoskeletal: no tremors    Skin: Normal color        History of callus          Lab Results   Component Value Date/Time    Hemoglobin A1c 7.8 (H) 08/09/2022 02:08 PM    Hemoglobin A1c 7.1 (H) 03/16/2022 09:41 AM    Hemoglobin A1c 7.1 (H) 11/03/2021 11:29 AM    Glucose 271 (H) 08/09/2022 02:08 PM    Glucose  10/10/2018 02:01 PM    Microalb/Creat ratio (ug/mg creat.) 3 03/24/2021 03:20 PM    LDL,Direct 36 12/01/2020 05:00 PM    LDL, calculated 63 03/16/2022 09:41 AM    LDL, calculated 41 02/07/2019 04:02 PM    Creatinine 0.96 08/09/2022 02:08 PM    Hemoglobin A1c, External 6.8 08/22/2019 12:00 AM    Hemoglobin A1c, External 7.5 12/16/2017 12:00 AM    Hemoglobin A1c, External 8.7 09/24/2015 12:00 AM      Lab Results   Component Value Date/Time    GFR est AA >60 08/09/2022 02:08 PM    GFR est non-AA >60 08/09/2022 02:08 PM    Creatinine 0.96 08/09/2022 02:08 PM    BUN 10 08/09/2022 02:08 PM    Sodium 138 08/09/2022 02:08 PM    Potassium 4.0 08/09/2022 02:08 PM    Chloride 101 08/09/2022 02:08 PM    CO2 20 (L) 08/09/2022 02:08 PM        Assessment/Plan:     1. Type 2 diabetes mellitus with hyperglycemia, with long-term current use of insulin ([address]    2. Mixed hyperlipidemia    3. Non morbid obesity          1. Type 2 Diabetes Mellitus   Lab Results   Component Value Date/Time    Hemoglobin A1c 7.8 (H) 08/09/2022 02:08 PM    Hemoglobin A1c (POC) 7.7 10/10/2018 02:10 PM    Hemoglobin A1c, External 6.8 08/22/2019 12:00 AM     Lantus 24 units at bedtime  Metformin  Novolog or Humalog  with meals  Blood glucose fluctuating, reviewed the log, sometimes she eats from the trash can    Low-carb diet    Prior history  Due to increase in her psychotropic medications she is also hungry and constantly asking for the food, reportedly gets agitated when she does not get something to eat. She is sneaking food which is causing hyperglycemia.   It is a challenge to control blood glucose in these situations. Also blood glucose are better when there is a certain caregiver (Aiyana Guy), rest of the time bedtime blood glucose are really high, either missed insulin or giving her starchy food and do that. This pattern was seen last time also, this was discussed with the caregiver and to discuss with the director         2. HTN :  cannot tolerate ACE    3. Hyperlipidemia : Continue statin. 4.Obesity:Body mass index is 32.1 kg/m². Diet managed by Group home      Caregiver verbalized understanding      There are no Patient Instructions on file for this visit. Follow-up and Dispositions    Return in about 4 months (around 8/25/2023) for labs before next visit and follow up. Thank you for allowing me to participate in the care of this patient.     Leobardo Mohamud MD

## 2023-04-25 NOTE — PROGRESS NOTES
Malu Melgoza is a 42 y.o. female here for   Chief Complaint   Patient presents with    Diabetes       1. Have you been to the ER, urgent care clinic since your last visit? Hospitalized since your last visit? -no    2. Have you seen or consulted any other health care providers outside of the [address] [place] [address] since your last visit?   Include any pap smears or colon screening.-foot and ankle     '

## 2023-04-26 LAB
ANION GAP SERPL CALC-SCNC: 10 MMOL/L (ref 5–15)
BUN SERPL-MCNC: 9 MG/DL (ref 6–20)
BUN/CREAT SERPL: 10 (ref 12–20)
CALCIUM SERPL-MCNC: 9.6 MG/DL (ref 8.5–10.1)
CHLORIDE SERPL-SCNC: 101 MMOL/L (ref 97–108)
CO2 SERPL-SCNC: 21 MMOL/L (ref 21–32)
CREAT SERPL-MCNC: 0.91 MG/DL (ref 0.55–1.02)
CREAT UR-MCNC: 55.9 MG/DL
EST. AVERAGE GLUCOSE BLD GHB EST-MCNC: 146 MG/DL
GLUCOSE SERPL-MCNC: 292 MG/DL (ref 65–100)
HBA1C MFR BLD: 6.7 % (ref 4–5.6)
LDLC SERPL DIRECT ASSAY-MCNC: 51 MG/DL (ref 0–100)
MICROALBUMIN UR-MCNC: <0.5 MG/DL
MICROALBUMIN/CREAT UR-RTO: <9 MG/G (ref 0–30)
POTASSIUM SERPL-SCNC: 4.4 MMOL/L (ref 3.5–5.1)
SODIUM SERPL-SCNC: 132 MMOL/L (ref 136–145)

## 2023-05-24 DIAGNOSIS — E11.65 TYPE 2 DIABETES MELLITUS WITH HYPERGLYCEMIA, WITH LONG-TERM CURRENT USE OF INSULIN (HCC): Primary | ICD-10-CM

## 2023-05-24 DIAGNOSIS — Z79.4 TYPE 2 DIABETES MELLITUS WITH HYPERGLYCEMIA, WITH LONG-TERM CURRENT USE OF INSULIN (HCC): Primary | ICD-10-CM

## 2023-05-24 RX ORDER — CALCIUM CARBONATE 500 MG/1
TABLET, CHEWABLE ORAL
COMMUNITY
Start: 2023-05-01

## 2023-05-24 RX ORDER — PEN NEEDLE, DIABETIC 29 G X1/2"
NEEDLE, DISPOSABLE MISCELLANEOUS
COMMUNITY
Start: 2023-04-24

## 2023-05-24 RX ORDER — VITAMIN B COMPLEX
TABLET ORAL
COMMUNITY
Start: 2023-05-01

## 2023-05-24 RX ORDER — GLUCOSAM/CHON-MSM1/C/MANG/BOSW 500-416.6
TABLET ORAL
COMMUNITY
Start: 2023-05-03 | End: 2023-05-24 | Stop reason: SDUPTHER

## 2023-05-24 RX ORDER — DEXTROSE 3.75 G
TABLET,CHEWABLE ORAL
COMMUNITY
Start: 2023-03-27

## 2023-05-24 RX ORDER — GLUCOSAM/CHON-MSM1/C/MANG/BOSW 500-416.6
TABLET ORAL
Qty: 200 EACH | Refills: 11 | Status: SHIPPED | OUTPATIENT
Start: 2023-05-24

## 2023-05-30 DIAGNOSIS — E11.65 TYPE 2 DIABETES MELLITUS WITH HYPERGLYCEMIA, WITH LONG-TERM CURRENT USE OF INSULIN (HCC): Primary | ICD-10-CM

## 2023-05-30 DIAGNOSIS — Z79.4 TYPE 2 DIABETES MELLITUS WITH HYPERGLYCEMIA, WITH LONG-TERM CURRENT USE OF INSULIN (HCC): Primary | ICD-10-CM

## 2023-05-31 RX ORDER — BLOOD SUGAR DIAGNOSTIC
STRIP MISCELLANEOUS
Qty: 200 STRIP | Refills: 11 | Status: SHIPPED | OUTPATIENT
Start: 2023-05-31

## 2023-06-05 RX ORDER — MEDROXYPROGESTERONE ACETATE 150 MG/ML
INJECTION, SUSPENSION INTRAMUSCULAR
Qty: 1 ML | Refills: 11 | Status: SHIPPED | OUTPATIENT
Start: 2023-06-05

## 2023-06-20 ENCOUNTER — OFFICE VISIT (OUTPATIENT)
Age: 38
End: 2023-06-20
Payer: MEDICARE

## 2023-06-20 VITALS
BODY MASS INDEX: 31.92 KG/M2 | OXYGEN SATURATION: 99 % | SYSTOLIC BLOOD PRESSURE: 106 MMHG | DIASTOLIC BLOOD PRESSURE: 71 MMHG | HEART RATE: 106 BPM | TEMPERATURE: 98.7 F | WEIGHT: 187 LBS | HEIGHT: 64 IN

## 2023-06-20 DIAGNOSIS — Z79.4 TYPE 2 DIABETES MELLITUS WITH HYPERGLYCEMIA, WITH LONG-TERM CURRENT USE OF INSULIN (HCC): Primary | ICD-10-CM

## 2023-06-20 DIAGNOSIS — E78.2 MIXED HYPERLIPIDEMIA: ICD-10-CM

## 2023-06-20 DIAGNOSIS — E11.65 TYPE 2 DIABETES MELLITUS WITH HYPERGLYCEMIA, WITH LONG-TERM CURRENT USE OF INSULIN (HCC): Primary | ICD-10-CM

## 2023-06-20 DIAGNOSIS — F84.0 ACTIVE AUTISTIC DISORDER: ICD-10-CM

## 2023-06-20 DIAGNOSIS — R56.9 FOCAL SEIZURES (HCC): ICD-10-CM

## 2023-06-20 DIAGNOSIS — E55.9 HYPOVITAMINOSIS D: ICD-10-CM

## 2023-06-20 PROCEDURE — 99214 OFFICE O/P EST MOD 30 MIN: CPT | Performed by: STUDENT IN AN ORGANIZED HEALTH CARE EDUCATION/TRAINING PROGRAM

## 2023-06-20 PROCEDURE — 3044F HG A1C LEVEL LT 7.0%: CPT | Performed by: STUDENT IN AN ORGANIZED HEALTH CARE EDUCATION/TRAINING PROGRAM

## 2023-06-20 PROCEDURE — 2022F DILAT RTA XM EVC RTNOPTHY: CPT | Performed by: STUDENT IN AN ORGANIZED HEALTH CARE EDUCATION/TRAINING PROGRAM

## 2023-06-20 PROCEDURE — G8417 CALC BMI ABV UP PARAM F/U: HCPCS | Performed by: STUDENT IN AN ORGANIZED HEALTH CARE EDUCATION/TRAINING PROGRAM

## 2023-06-20 PROCEDURE — 1036F TOBACCO NON-USER: CPT | Performed by: STUDENT IN AN ORGANIZED HEALTH CARE EDUCATION/TRAINING PROGRAM

## 2023-06-20 PROCEDURE — G8427 DOCREV CUR MEDS BY ELIG CLIN: HCPCS | Performed by: STUDENT IN AN ORGANIZED HEALTH CARE EDUCATION/TRAINING PROGRAM

## 2023-06-20 SDOH — ECONOMIC STABILITY: HOUSING INSECURITY
IN THE LAST 12 MONTHS, WAS THERE A TIME WHEN YOU DID NOT HAVE A STEADY PLACE TO SLEEP OR SLEPT IN A SHELTER (INCLUDING NOW)?: NO

## 2023-06-20 SDOH — ECONOMIC STABILITY: FOOD INSECURITY: WITHIN THE PAST 12 MONTHS, THE FOOD YOU BOUGHT JUST DIDN'T LAST AND YOU DIDN'T HAVE MONEY TO GET MORE.: NEVER TRUE

## 2023-06-20 SDOH — ECONOMIC STABILITY: FOOD INSECURITY: WITHIN THE PAST 12 MONTHS, YOU WORRIED THAT YOUR FOOD WOULD RUN OUT BEFORE YOU GOT MONEY TO BUY MORE.: NEVER TRUE

## 2023-06-20 SDOH — ECONOMIC STABILITY: INCOME INSECURITY: HOW HARD IS IT FOR YOU TO PAY FOR THE VERY BASICS LIKE FOOD, HOUSING, MEDICAL CARE, AND HEATING?: NOT HARD AT ALL

## 2023-08-02 ENCOUNTER — TELEPHONE (OUTPATIENT)
Age: 38
End: 2023-08-02

## 2023-08-02 NOTE — TELEPHONE ENCOUNTER
----- Message from [address] sent at 8/2/2023 12:22 PM EDT -----  Subject: Message to Provider    QUESTIONS  Information for Provider? Andra White called back to let office know patient   does have refills on her depo until 6/2024. The facility will have her at   her appointment. 8/3. Margaret jha call back to let her know if the   medication needs to be brought with her to appointment or if the office   will already have it.   ---------------------------------------------------------------------------  --------------  Britton Sigourney Christie  [phone]; OK to leave message on voicemail  ---------------------------------------------------------------------------  --------------  SCRIPT ANSWERS  Relationship to Patient? Covered Entity  Covered Entity Type? Other  Other Covered Entity Type? Group home  Representative Name?  Andra White

## 2023-08-03 ENCOUNTER — OFFICE VISIT (OUTPATIENT)
Age: 38
End: 2023-08-03
Payer: MEDICARE

## 2023-08-03 DIAGNOSIS — N92.6 IRREGULAR MENSTRUATION, UNSPECIFIED: Primary | ICD-10-CM

## 2023-08-03 PROCEDURE — 96372 THER/PROPH/DIAG INJ SC/IM: CPT | Performed by: NURSE PRACTITIONER

## 2023-08-03 PROCEDURE — PBSHW PBB SHADOW CHARGE: Performed by: NURSE PRACTITIONER

## 2023-08-03 RX ORDER — MEDROXYPROGESTERONE ACETATE 150 MG/ML
150 INJECTION, SUSPENSION INTRAMUSCULAR ONCE
Status: COMPLETED | OUTPATIENT
Start: 2023-08-03 | End: 2023-08-03

## 2023-08-03 RX ADMIN — MEDROXYPROGESTERONE ACETATE 150 MG: 150 INJECTION, SUSPENSION INTRAMUSCULAR at 14:35

## 2023-08-03 NOTE — PROGRESS NOTES
Chief Complaint   Patient presents with    Injections     depo     Depo only      [facility], and per orders of taylor, injection of depo given by Linda Morales LPN in (R) delt. Patient instructed to remain in clinic for 20 minutes afterwards, and to report any adverse reaction to me immediately. Patient did not display any adverse side effects. Patient was advsied to return in 3 month(s).

## 2023-08-15 DIAGNOSIS — E11.65 TYPE 2 DIABETES MELLITUS WITH HYPERGLYCEMIA, WITH LONG-TERM CURRENT USE OF INSULIN (HCC): Primary | ICD-10-CM

## 2023-08-15 DIAGNOSIS — Z79.4 TYPE 2 DIABETES MELLITUS WITH HYPERGLYCEMIA, WITH LONG-TERM CURRENT USE OF INSULIN (HCC): Primary | ICD-10-CM

## 2023-08-26 ENCOUNTER — TELEPHONE (OUTPATIENT)
Age: 38
End: 2023-08-26

## 2023-08-26 NOTE — TELEPHONE ENCOUNTER
Group home called stating the patient did not receive her morning meds because they did not have med tech. I advised MsMorgan Rachana Mederos that some of the medications we do prescribe but would advise giving medication at next scheduled time and monitor the patient.

## 2023-09-01 ENCOUNTER — OFFICE VISIT (OUTPATIENT)
Age: 38
End: 2023-09-01
Payer: MEDICARE

## 2023-09-01 VITALS
HEART RATE: 107 BPM | OXYGEN SATURATION: 97 % | DIASTOLIC BLOOD PRESSURE: 84 MMHG | SYSTOLIC BLOOD PRESSURE: 120 MMHG | BODY MASS INDEX: 31.34 KG/M2 | RESPIRATION RATE: 20 BRPM | HEIGHT: 64 IN | WEIGHT: 183.6 LBS

## 2023-09-01 DIAGNOSIS — R46.89 BEHAVIORAL CHANGE: Primary | ICD-10-CM

## 2023-09-01 DIAGNOSIS — R39.198 ALTERED URINARY ELIMINATION PATTERN: ICD-10-CM

## 2023-09-01 DIAGNOSIS — E11.65 TYPE 2 DIABETES MELLITUS WITH HYPERGLYCEMIA, WITH LONG-TERM CURRENT USE OF INSULIN (HCC): Primary | ICD-10-CM

## 2023-09-01 DIAGNOSIS — Z79.4 TYPE 2 DIABETES MELLITUS WITH HYPERGLYCEMIA, WITH LONG-TERM CURRENT USE OF INSULIN (HCC): Primary | ICD-10-CM

## 2023-09-01 LAB
BILIRUBIN, URINE, POC: NEGATIVE
BLOOD URINE, POC: NORMAL
GLUCOSE URINE, POC: NORMAL
KETONES, URINE, POC: NORMAL
LEUKOCYTE ESTERASE, URINE, POC: NEGATIVE
NITRITE, URINE, POC: NEGATIVE
PH, URINE, POC: 7 (ref 4.6–8)
PROTEIN,URINE, POC: NEGATIVE
SPECIFIC GRAVITY, URINE, POC: 1.02 (ref 1–1.03)
URINALYSIS CLARITY, POC: CLEAR
URINALYSIS COLOR, POC: YELLOW
UROBILINOGEN, POC: NORMAL

## 2023-09-01 PROCEDURE — G8427 DOCREV CUR MEDS BY ELIG CLIN: HCPCS | Performed by: PHYSICIAN ASSISTANT

## 2023-09-01 PROCEDURE — 81002 URINALYSIS NONAUTO W/O SCOPE: CPT | Performed by: PHYSICIAN ASSISTANT

## 2023-09-01 PROCEDURE — 1036F TOBACCO NON-USER: CPT | Performed by: PHYSICIAN ASSISTANT

## 2023-09-01 PROCEDURE — 99213 OFFICE O/P EST LOW 20 MIN: CPT | Performed by: PHYSICIAN ASSISTANT

## 2023-09-01 PROCEDURE — G8417 CALC BMI ABV UP PARAM F/U: HCPCS | Performed by: PHYSICIAN ASSISTANT

## 2023-09-01 PROCEDURE — PBSHW AMB POC URINALYSIS DIP STICK MANUAL W/O MICRO: Performed by: PHYSICIAN ASSISTANT

## 2023-09-01 RX ORDER — MULTIVIT,IRON,MINERALS/LUTEIN
TABLET ORAL
COMMUNITY
Start: 2023-06-01

## 2023-09-01 RX ORDER — PEN NEEDLE, DIABETIC 29 G X1/2"
NEEDLE, DISPOSABLE MISCELLANEOUS
COMMUNITY
Start: 2023-08-28

## 2023-09-01 RX ORDER — INSULIN LISPRO 100 [IU]/ML
INJECTION, SOLUTION INTRAVENOUS; SUBCUTANEOUS
Qty: 45 ML | Refills: 3 | Status: SHIPPED | OUTPATIENT
Start: 2023-09-01

## 2023-09-01 RX ORDER — INSULIN GLARGINE 100 [IU]/ML
24 INJECTION, SOLUTION SUBCUTANEOUS NIGHTLY
Qty: 30 ML | Refills: 3 | Status: SHIPPED | OUTPATIENT
Start: 2023-09-01

## 2023-09-01 RX ORDER — FLUCONAZOLE 150 MG/1
150 TABLET ORAL ONCE
Qty: 1 TABLET | Refills: 0 | Status: SHIPPED | OUTPATIENT
Start: 2023-09-01 | End: 2023-09-01

## 2023-09-01 ASSESSMENT — ANXIETY QUESTIONNAIRES
1. FEELING NERVOUS, ANXIOUS, OR ON EDGE: 0
4. TROUBLE RELAXING: 0
5. BEING SO RESTLESS THAT IT IS HARD TO SIT STILL: 0
7. FEELING AFRAID AS IF SOMETHING AWFUL MIGHT HAPPEN: 0
6. BECOMING EASILY ANNOYED OR IRRITABLE: 0
2. NOT BEING ABLE TO STOP OR CONTROL WORRYING: 0
IF YOU CHECKED OFF ANY PROBLEMS ON THIS QUESTIONNAIRE, HOW DIFFICULT HAVE THESE PROBLEMS MADE IT FOR YOU TO DO YOUR WORK, TAKE CARE OF THINGS AT HOME, OR GET ALONG WITH OTHER PEOPLE: NOT DIFFICULT AT ALL
GAD7 TOTAL SCORE: 0
3. WORRYING TOO MUCH ABOUT DIFFERENT THINGS: 0

## 2023-09-01 ASSESSMENT — PATIENT HEALTH QUESTIONNAIRE - PHQ9
2. FEELING DOWN, DEPRESSED OR HOPELESS: 0
1. LITTLE INTEREST OR PLEASURE IN DOING THINGS: 0
SUM OF ALL RESPONSES TO PHQ QUESTIONS 1-9: 0
SUM OF ALL RESPONSES TO PHQ9 QUESTIONS 1 & 2: 0

## 2023-09-01 NOTE — PROGRESS NOTES
Faith Sandhoff is a 42 y.o. female , id x 2(name and ). Reviewed record, history, and  medications. Chief Complaint   Patient presents with    Urinary Frequency     Patient having problems with bed wetting and increased progression. Vitals:    23 0931   Weight: 183 lb 9.6 oz (83.3 kg)   Height: 5' 4\" (1.626 m)       Coordination of Care Questionnaire:   1. Have you been to the ER, urgent care clinic since your last visit? Hospitalized since your last visit? No    2. Have you seen or consulted any other health care providers outside of the [address] [place] [address] since your last visit? Include any pap smears or colon screening. No      PHQ-9  2023   Little interest or pleasure in doing things 0   Little interest or pleasure in doing things -   Feeling down, depressed, or hopeless 0   PHQ-2 Score 0   Total Score PHQ 2 -   PHQ-9 Total Score 0       PAT-7 SCREENING 2023   Feeling nervous, anxious, or on edge Not at all   Not being able to stop or control worrying Not at all   Worrying too much about different things Not at all   Trouble relaxing Not at all   Being so restless that it is hard to sit still Not at all   Becoming easily annoyed or irritable Not at all   Feeling afraid as if something awful might happen Not at all   PAT-7 Total Score 0   How difficult have these problems made it for you to do your work, take care of things at home, or get along with other people?  Not difficult at all       Social Determinants of Health     Tobacco Use: Low Risk     Smoking Tobacco Use: Never    Smokeless Tobacco Use: Never    Passive Exposure: Not on file   Alcohol Use: Not on file   Financial Resource Strain: Low Risk     Difficulty of Paying Living Expenses: Not hard at all   Food Insecurity: No Food Insecurity    Worried About Running Out of Food in the Last Year: Never true    Ran Out of Food in the Last Year: Never true   Transportation Needs: Unknown    Lack of Transportation
MANUAL W/O MICRO  -     Culture, Urine; Future    Altered urinary elimination pattern  -     AMB POC URINALYSIS DIP STICK MANUAL W/O MICRO  -     Culture, Urine; Future    Other orders  -     fluconazole (DIFLUCAN) 150 MG tablet; Take 1 tablet by mouth once for 1 dose    Diflucan has been sent to the pharmacy for possible vaginal yeast infection. I explained to the caregiver that she does not appear to have a urinary tract infection based on the urinalysis. Her urine will be sent for culture to back this up. Also have reprinted the labs for the patient that Dr. Jessica Dutton had ordered. I advised the patient should be contacted with the results once Dr. Jessica Dutton has reviewed them. Time was used for level of billing: no     No follow-up provider specified. I have discussed the diagnosis with the patient and the intended plan as seen in the above orders. The patient has received an after-visit summary and questions were answered concerning future plans.      Medication Side Effects and Warnings were discussed with patient: Yes  Patient Labs were reviewed and or requested: Yes  Patient Past Records were reviewed and or requested  Yes    Carolyne Park PA-C

## 2023-09-02 LAB
BACTERIA SPEC CULT: NORMAL
CC UR VC: NORMAL
SERVICE CMNT-IMP: NORMAL

## 2023-09-04 RX ORDER — PEN NEEDLE, DIABETIC 29 G X1/2"
NEEDLE, DISPOSABLE MISCELLANEOUS
Qty: 100 EACH | Refills: 12 | Status: SHIPPED | OUTPATIENT
Start: 2023-09-04

## 2023-09-19 ENCOUNTER — TELEPHONE (OUTPATIENT)
Age: 38
End: 2023-09-19

## 2023-09-19 LAB
ALBUMIN SERPL-MCNC: 4.2 G/DL (ref 3.9–4.9)
ALBUMIN/CREAT UR: 4 MG/G CREAT (ref 0–29)
ALBUMIN/GLOB SERPL: 1.6 {RATIO} (ref 1.2–2.2)
ALP SERPL-CCNC: 83 IU/L (ref 44–121)
ALT SERPL-CCNC: 9 IU/L (ref 0–32)
AST SERPL-CCNC: 12 IU/L (ref 0–40)
BASOPHILS # BLD AUTO: 0 X10E3/UL (ref 0–0.2)
BASOPHILS NFR BLD AUTO: 1 %
BILIRUB SERPL-MCNC: <0.2 MG/DL (ref 0–1.2)
BUN SERPL-MCNC: 7 MG/DL (ref 6–20)
BUN/CREAT SERPL: 9 (ref 9–23)
CALCIUM SERPL-MCNC: 9.5 MG/DL (ref 8.7–10.2)
CHLORIDE SERPL-SCNC: 100 MMOL/L (ref 96–106)
CHOLEST SERPL-MCNC: 106 MG/DL (ref 100–199)
CO2 SERPL-SCNC: 19 MMOL/L (ref 20–29)
CREAT SERPL-MCNC: 0.79 MG/DL (ref 0.57–1)
CREAT UR-MCNC: 132 MG/DL
EGFRCR SERPLBLD CKD-EPI 2021: 98 ML/MIN/1.73
EOSINOPHIL # BLD AUTO: 0 X10E3/UL (ref 0–0.4)
EOSINOPHIL NFR BLD AUTO: 0 %
ERYTHROCYTE [DISTWIDTH] IN BLOOD BY AUTOMATED COUNT: 11.7 % (ref 11.7–15.4)
GLOBULIN SER CALC-MCNC: 2.7 G/DL (ref 1.5–4.5)
GLUCOSE SERPL-MCNC: 279 MG/DL (ref 70–99)
HBA1C MFR BLD: 7.9 % (ref 4.8–5.6)
HCT VFR BLD AUTO: 40.3 % (ref 34–46.6)
HDLC SERPL-MCNC: 44 MG/DL
HGB BLD-MCNC: 13.7 G/DL (ref 11.1–15.9)
IMM GRANULOCYTES # BLD AUTO: 0 X10E3/UL (ref 0–0.1)
IMM GRANULOCYTES NFR BLD AUTO: 0 %
IMP & REVIEW OF LAB RESULTS: NORMAL
LDLC SERPL CALC-MCNC: 44 MG/DL (ref 0–99)
LYMPHOCYTES # BLD AUTO: 2.5 X10E3/UL (ref 0.7–3.1)
LYMPHOCYTES NFR BLD AUTO: 31 %
MCH RBC QN AUTO: 30 PG (ref 26.6–33)
MCHC RBC AUTO-ENTMCNC: 34 G/DL (ref 31.5–35.7)
MCV RBC AUTO: 88 FL (ref 79–97)
MICROALBUMIN UR-MCNC: 4.8 UG/ML
MONOCYTES # BLD AUTO: 0.5 X10E3/UL (ref 0.1–0.9)
MONOCYTES NFR BLD AUTO: 6 %
NEUTROPHILS # BLD AUTO: 5 X10E3/UL (ref 1.4–7)
NEUTROPHILS NFR BLD AUTO: 62 %
PLATELET # BLD AUTO: 257 X10E3/UL (ref 150–450)
POTASSIUM SERPL-SCNC: 4.2 MMOL/L (ref 3.5–5.2)
PROT SERPL-MCNC: 6.9 G/DL (ref 6–8.5)
RBC # BLD AUTO: 4.56 X10E6/UL (ref 3.77–5.28)
SODIUM SERPL-SCNC: 136 MMOL/L (ref 134–144)
TRIGL SERPL-MCNC: 91 MG/DL (ref 0–149)
VLDLC SERPL CALC-MCNC: 18 MG/DL (ref 5–40)
WBC # BLD AUTO: 8 X10E3/UL (ref 3.4–10.8)

## 2023-09-19 NOTE — TELEPHONE ENCOUNTER
Katie's patient. Ms Leelee Mckinney would like to speak with nurse as to when the last depo was given and when the next one should be given.  [phone] [facility]

## 2023-10-06 ENCOUNTER — CLINICAL DOCUMENTATION (OUTPATIENT)
Age: 38
End: 2023-10-06

## 2023-10-10 RX ORDER — CALCIUM CARBONATE 500 MG/1
1 TABLET, CHEWABLE ORAL 2 TIMES DAILY
Qty: 62 TABLET | Refills: 12 | Status: SHIPPED | OUTPATIENT
Start: 2023-10-10

## 2023-10-20 ENCOUNTER — OFFICE VISIT (OUTPATIENT)
Age: 38
End: 2023-10-20
Payer: MEDICARE

## 2023-10-20 VITALS
BODY MASS INDEX: 31.99 KG/M2 | HEIGHT: 64 IN | WEIGHT: 187.4 LBS | SYSTOLIC BLOOD PRESSURE: 90 MMHG | DIASTOLIC BLOOD PRESSURE: 64 MMHG | HEART RATE: 93 BPM

## 2023-10-20 DIAGNOSIS — E11.65 TYPE 2 DIABETES MELLITUS WITH HYPERGLYCEMIA, UNSPECIFIED WHETHER LONG TERM INSULIN USE (HCC): Primary | ICD-10-CM

## 2023-10-20 DIAGNOSIS — E78.2 MIXED HYPERLIPIDEMIA: ICD-10-CM

## 2023-10-20 PROCEDURE — 1036F TOBACCO NON-USER: CPT | Performed by: INTERNAL MEDICINE

## 2023-10-20 PROCEDURE — G8417 CALC BMI ABV UP PARAM F/U: HCPCS | Performed by: INTERNAL MEDICINE

## 2023-10-20 PROCEDURE — 99214 OFFICE O/P EST MOD 30 MIN: CPT | Performed by: INTERNAL MEDICINE

## 2023-10-20 PROCEDURE — G8427 DOCREV CUR MEDS BY ELIG CLIN: HCPCS | Performed by: INTERNAL MEDICINE

## 2023-10-20 PROCEDURE — G8484 FLU IMMUNIZE NO ADMIN: HCPCS | Performed by: INTERNAL MEDICINE

## 2023-10-20 PROCEDURE — 3051F HG A1C>EQUAL 7.0%<8.0%: CPT | Performed by: INTERNAL MEDICINE

## 2023-10-20 PROCEDURE — 2022F DILAT RTA XM EVC RTNOPTHY: CPT | Performed by: INTERNAL MEDICINE

## 2024-01-05 ENCOUNTER — TELEPHONE (OUTPATIENT)
Age: 39
End: 2024-01-05

## 2024-01-05 NOTE — TELEPHONE ENCOUNTER
----- Message from Randi Bowman sent at 1/5/2024  2:58 PM EST -----  Subject: Message to Provider    QUESTIONS  Information for Provider? Pt is needing an appointment for her depo shot,   she missed the last two. Home Health called to set up, please call Araceli   [phone]  ---------------------------------------------------------------------------  --------------  CALL BACK INFO  [phone]; OK to leave message on voicemail  ---------------------------------------------------------------------------  --------------  SCRIPT ANSWERS  Relationship to Patient? Covered Entity  Covered Entity Type? Home Health Care?  Representative Name? [facility]  (Is the patient requesting to see the provider for a procedure?)? Yes

## 2024-01-25 DIAGNOSIS — E11.65 TYPE 2 DIABETES MELLITUS WITH HYPERGLYCEMIA, WITH LONG-TERM CURRENT USE OF INSULIN (HCC): ICD-10-CM

## 2024-01-25 DIAGNOSIS — Z79.4 TYPE 2 DIABETES MELLITUS WITH HYPERGLYCEMIA, WITH LONG-TERM CURRENT USE OF INSULIN (HCC): ICD-10-CM

## 2024-01-25 RX ORDER — METOPROLOL SUCCINATE 25 MG/1
TABLET, EXTENDED RELEASE ORAL DAILY
Qty: 30 TABLET | Refills: 3 | Status: SHIPPED | OUTPATIENT
Start: 2024-01-25

## 2024-01-30 ENCOUNTER — CLINICAL DOCUMENTATION (OUTPATIENT)
Age: 39
End: 2024-01-30

## 2024-02-02 RX ORDER — PEN NEEDLE, DIABETIC 29 G X1/2"
NEEDLE, DISPOSABLE MISCELLANEOUS
Qty: 100 EACH | Refills: 3 | Status: SHIPPED | OUTPATIENT
Start: 2024-02-02

## 2024-02-02 RX ORDER — DEXTROSE 3.75 G
TABLET,CHEWABLE ORAL
Qty: 10 TABLET | Refills: 4 | Status: SHIPPED | OUTPATIENT
Start: 2024-02-02

## 2024-02-02 RX ORDER — BLOOD SUGAR DIAGNOSTIC
STRIP MISCELLANEOUS
Refills: 0 | OUTPATIENT
Start: 2024-02-02

## 2024-02-02 RX ORDER — GLUCOSAM/CHON-MSM1/C/MANG/BOSW 500-416.6
TABLET ORAL
Refills: 0 | OUTPATIENT
Start: 2024-02-02

## 2024-02-05 DIAGNOSIS — Z79.4 TYPE 2 DIABETES MELLITUS WITH HYPERGLYCEMIA, WITH LONG-TERM CURRENT USE OF INSULIN (HCC): ICD-10-CM

## 2024-02-05 DIAGNOSIS — E11.65 TYPE 2 DIABETES MELLITUS WITH HYPERGLYCEMIA, WITH LONG-TERM CURRENT USE OF INSULIN (HCC): ICD-10-CM

## 2024-02-05 RX ORDER — GLUCOSAM/CHON-MSM1/C/MANG/BOSW 500-416.6
TABLET ORAL
Qty: 200 EACH | Refills: 11 | Status: SHIPPED | OUTPATIENT
Start: 2024-02-05

## 2024-02-05 RX ORDER — PEN NEEDLE, DIABETIC 29 G X1/2"
NEEDLE, DISPOSABLE MISCELLANEOUS
Qty: 100 EACH | Refills: 3 | Status: SHIPPED | OUTPATIENT
Start: 2024-02-05

## 2024-02-05 RX ORDER — BLOOD SUGAR DIAGNOSTIC
STRIP MISCELLANEOUS
Qty: 200 STRIP | Refills: 11 | Status: SHIPPED | OUTPATIENT
Start: 2024-02-05

## 2024-02-26 ENCOUNTER — OFFICE VISIT (OUTPATIENT)
Age: 39
End: 2024-02-26
Payer: MEDICARE

## 2024-02-26 VITALS
TEMPERATURE: 98 F | RESPIRATION RATE: 18 BRPM | SYSTOLIC BLOOD PRESSURE: 122 MMHG | BODY MASS INDEX: 31.92 KG/M2 | HEIGHT: 64 IN | OXYGEN SATURATION: 99 % | DIASTOLIC BLOOD PRESSURE: 86 MMHG | HEART RATE: 99 BPM | WEIGHT: 187 LBS

## 2024-02-26 DIAGNOSIS — Z79.4 TYPE 2 DIABETES MELLITUS WITH HYPERGLYCEMIA, WITH LONG-TERM CURRENT USE OF INSULIN (HCC): Primary | ICD-10-CM

## 2024-02-26 DIAGNOSIS — R56.9 FOCAL SEIZURES (HCC): ICD-10-CM

## 2024-02-26 DIAGNOSIS — E11.65 TYPE 2 DIABETES MELLITUS WITH HYPERGLYCEMIA, WITH LONG-TERM CURRENT USE OF INSULIN (HCC): Primary | ICD-10-CM

## 2024-02-26 DIAGNOSIS — E78.2 MIXED HYPERLIPIDEMIA: ICD-10-CM

## 2024-02-26 PROCEDURE — G8427 DOCREV CUR MEDS BY ELIG CLIN: HCPCS | Performed by: FAMILY MEDICINE

## 2024-02-26 PROCEDURE — 1036F TOBACCO NON-USER: CPT | Performed by: FAMILY MEDICINE

## 2024-02-26 PROCEDURE — 2022F DILAT RTA XM EVC RTNOPTHY: CPT | Performed by: FAMILY MEDICINE

## 2024-02-26 PROCEDURE — G8417 CALC BMI ABV UP PARAM F/U: HCPCS | Performed by: FAMILY MEDICINE

## 2024-02-26 PROCEDURE — 99214 OFFICE O/P EST MOD 30 MIN: CPT | Performed by: FAMILY MEDICINE

## 2024-02-26 PROCEDURE — G8484 FLU IMMUNIZE NO ADMIN: HCPCS | Performed by: FAMILY MEDICINE

## 2024-02-26 PROCEDURE — 3052F HG A1C>EQUAL 8.0%<EQUAL 9.0%: CPT | Performed by: FAMILY MEDICINE

## 2024-02-26 ASSESSMENT — PATIENT HEALTH QUESTIONNAIRE - PHQ9
1. LITTLE INTEREST OR PLEASURE IN DOING THINGS: 0
SUM OF ALL RESPONSES TO PHQ QUESTIONS 1-9: 0
2. FEELING DOWN, DEPRESSED OR HOPELESS: 0
SUM OF ALL RESPONSES TO PHQ9 QUESTIONS 1 & 2: 0

## 2024-02-26 NOTE — PROGRESS NOTES
Patient here from [facility], accompanied by Genesis, . Patient bs have been running high, 400-500 after breakfast. She is a patient of Dr. Tejada, and needs a doctor referral to her in the system.     1. Have you been to the ER, urgent care clinic since your last visit?  Hospitalized since your last visit?No    2. Have you seen or consulted any other health care providers outside of the [facility] since your last visit?  Include any pap smears or colon screening. No

## 2024-02-26 NOTE — PROGRESS NOTES
Patient here from [facility], accompanied by Genesis, . Patient bs have been running high, 400-500 after breakfast. She is a patient of Dr. Tejada, and needs a doctor referral to her in the system.     1. Have you been to the ER, urgent care clinic since your last visit?  Hospitalized since your last visit?No    2. Have you seen or consulted any other health care providers outside of the [facility] since your last visit?  Include any pap smears or colon screening. No    Subjective:     Rubi Albert is a 38 y.o. female seen for follow up of diabetes.   She also has hypertension and hyperlipidemia.    See Diabetic Report Card Above.    Complete PmHX, Sochx, RxHx, Labs all reviewed in the chart.    Past Medical History:   Diagnosis Date    Anxiety     Autism     Depression     Diabetes (HCC)     Intermittent explosive disorder     Seizures (HCC)        Review of Systems  A comprehensive review of systems was negative except for that written in the HPI.    Objective:   Exam:    Vitals:    02/26/24 1501   BP: 122/86   Pulse: 99   Resp: 18   Temp: 98 °F (36.7 °C)   SpO2: 99%   Weight: 84.8 kg (187 lb)   Height: 1.626 m (5' 4\")      Assessment/Plan:     1. Diabetes poorly controlled:    See above Diabetic Report Card         Hemoglobin A1C   Date Value Ref Range Status   09/18/2023 7.9 (H) 4.8 - 5.6 % Final     Comment:              Prediabetes: 5.7 - 6.4           Diabetes: >6.4           Glycemic control for adults with diabetes: <7.0       Diabetic issues reviewed with patient: Goal A1c of less than 7 addressed with patient and patient understands associated risks with poor A1c control.  Diabetic diet discussed with patient.  All medications, side effects and compliance discussed carefully.  Recommended annual eye examinations at Ophthalmology.  Patient was instructed to follow up every 3-6 months depending on overall control of their diabetes, HTN and high

## 2024-02-27 LAB
ALBUMIN SERPL-MCNC: 3.3 G/DL (ref 3.5–5)
ALBUMIN/GLOB SERPL: 0.9 (ref 1.1–2.2)
ALP SERPL-CCNC: 66 U/L (ref 45–117)
ALT SERPL-CCNC: 12 U/L (ref 12–78)
ANION GAP SERPL CALC-SCNC: 11 MMOL/L (ref 5–15)
AST SERPL-CCNC: 6 U/L (ref 15–37)
BILIRUB SERPL-MCNC: 0.2 MG/DL (ref 0.2–1)
BUN SERPL-MCNC: 13 MG/DL (ref 6–20)
BUN/CREAT SERPL: 15 (ref 12–20)
CALCIUM SERPL-MCNC: 9.4 MG/DL (ref 8.5–10.1)
CHLORIDE SERPL-SCNC: 99 MMOL/L (ref 97–108)
CHOLEST SERPL-MCNC: 115 MG/DL
CO2 SERPL-SCNC: 22 MMOL/L (ref 21–32)
CREAT SERPL-MCNC: 0.87 MG/DL (ref 0.55–1.02)
EST. AVERAGE GLUCOSE BLD GHB EST-MCNC: 212 MG/DL
GLOBULIN SER CALC-MCNC: 3.5 G/DL (ref 2–4)
GLUCOSE SERPL-MCNC: 310 MG/DL (ref 65–100)
HBA1C MFR BLD: 9 % (ref 4–5.6)
HDLC SERPL-MCNC: 57 MG/DL
HDLC SERPL: 2 (ref 0–5)
LDLC SERPL CALC-MCNC: 43.6 MG/DL (ref 0–100)
POTASSIUM SERPL-SCNC: 4.2 MMOL/L (ref 3.5–5.1)
PROT SERPL-MCNC: 6.8 G/DL (ref 6.4–8.2)
SODIUM SERPL-SCNC: 132 MMOL/L (ref 136–145)
TRIGL SERPL-MCNC: 72 MG/DL
VLDLC SERPL CALC-MCNC: 14.4 MG/DL

## 2024-03-26 RX ORDER — SIMVASTATIN 10 MG
TABLET ORAL
Qty: 28 TABLET | Refills: 0 | Status: SHIPPED | OUTPATIENT
Start: 2024-03-26

## 2024-03-26 RX ORDER — CETIRIZINE HYDROCHLORIDE 10 MG/1
TABLET ORAL
Qty: 28 TABLET | Refills: 0 | Status: SHIPPED | OUTPATIENT
Start: 2024-03-26

## 2024-03-29 ENCOUNTER — TELEPHONE (OUTPATIENT)
Age: 39
End: 2024-03-29

## 2024-03-29 NOTE — TELEPHONE ENCOUNTER
Katie Razo from [facility] said they took this patient to 2 different lab corps and they did not see the lab order we opened for them. Please resend (electronically) to all [facility] the patient's orders and Fax a physical copy to Ms Razo at [phone].

## 2024-03-29 NOTE — TELEPHONE ENCOUNTER
Attempted to call, unsuccessful. Unable to leave .   Faxed paper to provided fax number stating no loabs were ordered. PCP has been doing the labs. Will use PCP's labs for f/up.

## 2024-04-05 RX ORDER — LUBIPROSTONE 24 UG/1
24 CAPSULE ORAL 2 TIMES DAILY WITH MEALS
Qty: 56 CAPSULE | Refills: 0 | Status: SHIPPED | OUTPATIENT
Start: 2024-04-05

## 2024-04-05 RX ORDER — MELATONIN
1 DAILY
Qty: 28 TABLET | Refills: 0 | Status: SHIPPED | OUTPATIENT
Start: 2024-04-05

## 2024-06-04 RX ORDER — CETIRIZINE HYDROCHLORIDE 10 MG/1
TABLET ORAL
Qty: 28 TABLET | Refills: 0 | Status: SHIPPED | OUTPATIENT
Start: 2024-06-04

## 2024-06-04 RX ORDER — LUBIPROSTONE 24 UG/1
24 CAPSULE ORAL 2 TIMES DAILY WITH MEALS
Qty: 56 CAPSULE | Refills: 0 | Status: SHIPPED | OUTPATIENT
Start: 2024-06-04

## 2024-06-04 RX ORDER — SIMVASTATIN 10 MG
TABLET ORAL
Qty: 28 TABLET | Refills: 0 | Status: SHIPPED | OUTPATIENT
Start: 2024-06-04

## 2024-06-25 RX ORDER — PEN NEEDLE, DIABETIC 29 G X1/2"
NEEDLE, DISPOSABLE MISCELLANEOUS
Qty: 100 EACH | Refills: 0 | OUTPATIENT
Start: 2024-06-25

## 2024-07-05 RX ORDER — METOPROLOL SUCCINATE 25 MG/1
TABLET, EXTENDED RELEASE ORAL DAILY
Qty: 30 TABLET | Refills: 0 | Status: SHIPPED | OUTPATIENT
Start: 2024-07-05

## 2024-07-05 RX ORDER — MELATONIN
1 DAILY
Qty: 28 TABLET | Refills: 0 | Status: SHIPPED | OUTPATIENT
Start: 2024-07-05

## 2024-07-23 RX ORDER — PEN NEEDLE, DIABETIC 29 G X1/2"
NEEDLE, DISPOSABLE MISCELLANEOUS
Qty: 100 EACH | Refills: 0 | OUTPATIENT
Start: 2024-07-23

## 2024-07-23 RX ORDER — DEXTROSE 3.75 G
TABLET,CHEWABLE ORAL
Qty: 10 TABLET | Refills: 0 | OUTPATIENT
Start: 2024-07-23

## 2024-07-29 DIAGNOSIS — Z79.4 TYPE 2 DIABETES MELLITUS WITH HYPERGLYCEMIA, WITH LONG-TERM CURRENT USE OF INSULIN (HCC): ICD-10-CM

## 2024-07-29 DIAGNOSIS — E11.65 TYPE 2 DIABETES MELLITUS WITH HYPERGLYCEMIA, WITH LONG-TERM CURRENT USE OF INSULIN (HCC): ICD-10-CM

## 2024-08-01 RX ORDER — LUBIPROSTONE 24 UG/1
24 CAPSULE ORAL 2 TIMES DAILY WITH MEALS
Qty: 56 CAPSULE | Refills: 0 | Status: SHIPPED | OUTPATIENT
Start: 2024-08-01

## 2024-08-01 RX ORDER — SIMVASTATIN 10 MG
TABLET ORAL
Qty: 28 TABLET | Refills: 0 | Status: SHIPPED | OUTPATIENT
Start: 2024-08-01

## 2024-08-01 RX ORDER — CETIRIZINE HYDROCHLORIDE 10 MG/1
TABLET ORAL
Qty: 28 TABLET | Refills: 0 | Status: SHIPPED | OUTPATIENT
Start: 2024-08-01

## 2024-08-14 RX ORDER — MEDROXYPROGESTERONE ACETATE 150 MG/ML
INJECTION, SUSPENSION INTRAMUSCULAR
Qty: 1 ML | Refills: 0 | Status: SHIPPED | OUTPATIENT
Start: 2024-08-14

## 2024-08-20 RX ORDER — PEN NEEDLE, DIABETIC 29 G X1/2"
NEEDLE, DISPOSABLE MISCELLANEOUS
Qty: 100 EACH | Refills: 0 | Status: SHIPPED | OUTPATIENT
Start: 2024-08-20

## 2024-08-26 RX ORDER — UREA 10 %
1 LOTION (ML) TOPICAL 2 TIMES DAILY
Qty: 60 TABLET | Refills: 5 | Status: SHIPPED | OUTPATIENT
Start: 2024-08-26

## 2024-08-26 RX ORDER — CHOLECALCIFEROL (VITAMIN D3) 25 MCG
1 TABLET ORAL DAILY
Qty: 28 TABLET | Refills: 0 | Status: SHIPPED | OUTPATIENT
Start: 2024-08-26

## 2024-08-26 RX ORDER — METOPROLOL SUCCINATE 25 MG/1
TABLET, EXTENDED RELEASE ORAL DAILY
Qty: 30 TABLET | Refills: 0 | Status: SHIPPED | OUTPATIENT
Start: 2024-08-26

## 2024-09-24 DIAGNOSIS — Z79.4 TYPE 2 DIABETES MELLITUS WITH HYPERGLYCEMIA, WITH LONG-TERM CURRENT USE OF INSULIN (HCC): ICD-10-CM

## 2024-09-24 DIAGNOSIS — E11.65 TYPE 2 DIABETES MELLITUS WITH HYPERGLYCEMIA, WITH LONG-TERM CURRENT USE OF INSULIN (HCC): ICD-10-CM

## 2024-09-24 RX ORDER — SIMVASTATIN 10 MG
TABLET ORAL
Qty: 28 TABLET | Refills: 0 | Status: SHIPPED | OUTPATIENT
Start: 2024-09-24

## 2024-09-24 RX ORDER — CETIRIZINE HYDROCHLORIDE 10 MG/1
TABLET ORAL
Qty: 28 TABLET | Refills: 0 | Status: SHIPPED | OUTPATIENT
Start: 2024-09-24

## 2024-09-24 RX ORDER — LUBIPROSTONE 24 UG/1
24 CAPSULE ORAL 2 TIMES DAILY WITH MEALS
Qty: 56 CAPSULE | Refills: 0 | Status: SHIPPED | OUTPATIENT
Start: 2024-09-24

## 2024-09-25 DIAGNOSIS — Z79.4 TYPE 2 DIABETES MELLITUS WITH HYPERGLYCEMIA, WITH LONG-TERM CURRENT USE OF INSULIN (HCC): ICD-10-CM

## 2024-09-25 DIAGNOSIS — E11.65 TYPE 2 DIABETES MELLITUS WITH HYPERGLYCEMIA, WITH LONG-TERM CURRENT USE OF INSULIN (HCC): ICD-10-CM

## 2024-09-25 RX ORDER — INSULIN LISPRO 100 [IU]/ML
INJECTION, SOLUTION INTRAVENOUS; SUBCUTANEOUS
Qty: 15 ML | Refills: 0 | OUTPATIENT
Start: 2024-09-25

## 2024-09-26 DIAGNOSIS — Z79.4 TYPE 2 DIABETES MELLITUS WITH HYPERGLYCEMIA, WITH LONG-TERM CURRENT USE OF INSULIN (HCC): ICD-10-CM

## 2024-09-26 DIAGNOSIS — E11.65 TYPE 2 DIABETES MELLITUS WITH HYPERGLYCEMIA, WITH LONG-TERM CURRENT USE OF INSULIN (HCC): ICD-10-CM

## 2024-09-26 RX ORDER — INSULIN LISPRO 100 [IU]/ML
INJECTION, SOLUTION INTRAVENOUS; SUBCUTANEOUS
Qty: 15 ML | Refills: 0 | Status: SHIPPED | OUTPATIENT
Start: 2024-09-26

## 2024-09-30 DIAGNOSIS — Z79.4 TYPE 2 DIABETES MELLITUS WITH HYPERGLYCEMIA, WITH LONG-TERM CURRENT USE OF INSULIN (HCC): ICD-10-CM

## 2024-09-30 DIAGNOSIS — E11.65 TYPE 2 DIABETES MELLITUS WITH HYPERGLYCEMIA, WITH LONG-TERM CURRENT USE OF INSULIN (HCC): ICD-10-CM

## 2024-09-30 RX ORDER — INSULIN GLARGINE 100 [IU]/ML
INJECTION, SOLUTION SUBCUTANEOUS
Qty: 15 ML | Refills: 0 | Status: SHIPPED | OUTPATIENT
Start: 2024-09-30

## 2024-10-16 RX ORDER — PEN NEEDLE, DIABETIC 29 G X1/2"
NEEDLE, DISPOSABLE MISCELLANEOUS
Qty: 100 EACH | Refills: 0 | Status: SHIPPED | OUTPATIENT
Start: 2024-10-16

## 2024-10-17 ENCOUNTER — CLINICAL DOCUMENTATION (OUTPATIENT)
Age: 39
End: 2024-10-17

## 2024-10-17 NOTE — PROGRESS NOTES
[facility] request was completed 7 faxed to [phone],ok,Copy placed in scan folder to be scanned to chart.

## 2024-10-23 RX ORDER — CHOLECALCIFEROL (VITAMIN D3) 25 MCG
1 TABLET ORAL DAILY
Qty: 28 TABLET | Refills: 0 | Status: SHIPPED | OUTPATIENT
Start: 2024-10-23

## 2024-10-23 RX ORDER — METOPROLOL SUCCINATE 25 MG/1
TABLET, EXTENDED RELEASE ORAL DAILY
Qty: 30 TABLET | Refills: 0 | Status: SHIPPED | OUTPATIENT
Start: 2024-10-23

## 2024-10-28 ENCOUNTER — CLINICAL DOCUMENTATION (OUTPATIENT)
Age: 39
End: 2024-10-28

## 2024-10-29 ENCOUNTER — TELEPHONE (OUTPATIENT)
Age: 39
End: 2024-10-29

## 2024-10-29 DIAGNOSIS — E11.65 TYPE 2 DIABETES MELLITUS WITH HYPERGLYCEMIA, WITH LONG-TERM CURRENT USE OF INSULIN (HCC): ICD-10-CM

## 2024-10-29 DIAGNOSIS — Z79.4 TYPE 2 DIABETES MELLITUS WITH HYPERGLYCEMIA, WITH LONG-TERM CURRENT USE OF INSULIN (HCC): ICD-10-CM

## 2024-10-29 NOTE — TELEPHONE ENCOUNTER
Pharmacy is calling about the insulin lispro, 1 Unit Dial, (HUMALOG KWIKPEN) 100 UNIT/ML SOPN     Needs the scrip to say max per day

## 2024-10-30 RX ORDER — INSULIN LISPRO 100 [IU]/ML
INJECTION, SOLUTION INTRAVENOUS; SUBCUTANEOUS
Qty: 15 ML | Refills: 4 | Status: SHIPPED | OUTPATIENT
Start: 2024-10-30

## 2024-10-31 ENCOUNTER — TELEPHONE (OUTPATIENT)
Age: 39
End: 2024-10-31

## 2024-10-31 NOTE — TELEPHONE ENCOUNTER
Pharmacist needs clarification as to dosage of the medication. Needs to know how many times a day pt needs to use sliding scale or insurance will not cover prescription for insulin lispro, 1 Unit Dial, (HUMALOG KWIKPEN) 100 UNIT/ML SOPN   # [phone] Thank You

## 2024-11-21 DIAGNOSIS — E11.65 TYPE 2 DIABETES MELLITUS WITH HYPERGLYCEMIA, WITH LONG-TERM CURRENT USE OF INSULIN (HCC): ICD-10-CM

## 2024-11-21 DIAGNOSIS — Z79.4 TYPE 2 DIABETES MELLITUS WITH HYPERGLYCEMIA, WITH LONG-TERM CURRENT USE OF INSULIN (HCC): ICD-10-CM

## 2024-11-21 RX ORDER — LUBIPROSTONE 24 UG/1
24 CAPSULE ORAL 2 TIMES DAILY WITH MEALS
Qty: 56 CAPSULE | Refills: 0 | Status: SHIPPED | OUTPATIENT
Start: 2024-11-21

## 2024-11-21 RX ORDER — SIMVASTATIN 10 MG
TABLET ORAL
Qty: 28 TABLET | Refills: 0 | Status: SHIPPED | OUTPATIENT
Start: 2024-11-21

## 2024-11-21 RX ORDER — CETIRIZINE HYDROCHLORIDE 10 MG/1
TABLET ORAL
Qty: 28 TABLET | Refills: 0 | Status: SHIPPED | OUTPATIENT
Start: 2024-11-21

## 2024-12-10 RX ORDER — PEN NEEDLE, DIABETIC 29 G X1/2"
NEEDLE, DISPOSABLE MISCELLANEOUS
Qty: 100 EACH | Refills: 0 | Status: SHIPPED | OUTPATIENT
Start: 2024-12-10

## 2024-12-12 RX ORDER — METOPROLOL SUCCINATE 25 MG/1
TABLET, EXTENDED RELEASE ORAL DAILY
Qty: 30 TABLET | Refills: 0 | Status: SHIPPED | OUTPATIENT
Start: 2024-12-12

## 2024-12-12 RX ORDER — CHOLECALCIFEROL (VITAMIN D3) 25 MCG
1 TABLET ORAL DAILY
Qty: 28 TABLET | Refills: 0 | Status: SHIPPED | OUTPATIENT
Start: 2024-12-12

## 2024-12-31 DIAGNOSIS — Z79.4 TYPE 2 DIABETES MELLITUS WITH HYPERGLYCEMIA, WITH LONG-TERM CURRENT USE OF INSULIN (HCC): ICD-10-CM

## 2024-12-31 DIAGNOSIS — E11.65 TYPE 2 DIABETES MELLITUS WITH HYPERGLYCEMIA, WITH LONG-TERM CURRENT USE OF INSULIN (HCC): ICD-10-CM

## 2025-01-02 RX ORDER — INSULIN GLARGINE 100 [IU]/ML
INJECTION, SOLUTION SUBCUTANEOUS
Qty: 15 ML | Refills: 0 | Status: SHIPPED | OUTPATIENT
Start: 2025-01-02

## 2025-01-20 DIAGNOSIS — E11.65 TYPE 2 DIABETES MELLITUS WITH HYPERGLYCEMIA, WITH LONG-TERM CURRENT USE OF INSULIN (HCC): ICD-10-CM

## 2025-01-20 DIAGNOSIS — Z79.4 TYPE 2 DIABETES MELLITUS WITH HYPERGLYCEMIA, WITH LONG-TERM CURRENT USE OF INSULIN (HCC): ICD-10-CM

## 2025-01-20 RX ORDER — CETIRIZINE HYDROCHLORIDE 10 MG/1
TABLET ORAL
Qty: 28 TABLET | Refills: 0 | Status: SHIPPED | OUTPATIENT
Start: 2025-01-20

## 2025-01-20 RX ORDER — SIMVASTATIN 10 MG
TABLET ORAL
Qty: 28 TABLET | Refills: 0 | Status: SHIPPED | OUTPATIENT
Start: 2025-01-20

## 2025-01-22 RX ORDER — LUBIPROSTONE 24 UG/1
24 CAPSULE ORAL 2 TIMES DAILY WITH MEALS
Qty: 56 CAPSULE | Refills: 0 | Status: SHIPPED | OUTPATIENT
Start: 2025-01-22

## 2025-01-23 ENCOUNTER — TELEMEDICINE (OUTPATIENT)
Facility: CLINIC | Age: 40
End: 2025-01-23

## 2025-01-23 DIAGNOSIS — E11.65 TYPE 2 DIABETES MELLITUS WITH HYPERGLYCEMIA, WITH LONG-TERM CURRENT USE OF INSULIN (HCC): Primary | ICD-10-CM

## 2025-01-23 DIAGNOSIS — Z79.4 TYPE 2 DIABETES MELLITUS WITH HYPERGLYCEMIA, WITH LONG-TERM CURRENT USE OF INSULIN (HCC): Primary | ICD-10-CM

## 2025-01-28 RX ORDER — MEDROXYPROGESTERONE ACETATE 150 MG/ML
INJECTION, SUSPENSION INTRAMUSCULAR
Qty: 1 ML | Refills: 0 | Status: SHIPPED | OUTPATIENT
Start: 2025-01-28

## 2025-02-04 DIAGNOSIS — Z79.4 TYPE 2 DIABETES MELLITUS WITH HYPERGLYCEMIA, WITH LONG-TERM CURRENT USE OF INSULIN (HCC): ICD-10-CM

## 2025-02-04 DIAGNOSIS — E11.65 TYPE 2 DIABETES MELLITUS WITH HYPERGLYCEMIA, WITH LONG-TERM CURRENT USE OF INSULIN (HCC): ICD-10-CM

## 2025-02-05 RX ORDER — PEN NEEDLE, DIABETIC 29 G X1/2"
NEEDLE, DISPOSABLE MISCELLANEOUS
Qty: 100 EACH | Refills: 0 | Status: SHIPPED | OUTPATIENT
Start: 2025-02-05

## 2025-02-05 RX ORDER — BISMUTH SUBSALICYLATE 262MG/15ML
SUSPENSION, ORAL (FINAL DOSE FORM) ORAL
Qty: 200 EACH | Refills: 0 | Status: SHIPPED | OUTPATIENT
Start: 2025-02-05

## 2025-02-05 RX ORDER — BLOOD SUGAR DIAGNOSTIC
STRIP MISCELLANEOUS
Qty: 200 STRIP | Refills: 0 | Status: SHIPPED | OUTPATIENT
Start: 2025-02-05

## 2025-02-06 RX ORDER — CHOLECALCIFEROL (VITAMIN D3) 25 MCG
1 TABLET ORAL DAILY
Qty: 28 TABLET | Refills: 0 | Status: SHIPPED | OUTPATIENT
Start: 2025-02-06

## 2025-02-18 RX ORDER — METOPROLOL SUCCINATE 25 MG/1
TABLET, EXTENDED RELEASE ORAL DAILY
Qty: 30 TABLET | Refills: 0 | Status: SHIPPED | OUTPATIENT
Start: 2025-02-18

## 2025-02-21 DIAGNOSIS — E11.65 TYPE 2 DIABETES MELLITUS WITH HYPERGLYCEMIA, WITH LONG-TERM CURRENT USE OF INSULIN (HCC): ICD-10-CM

## 2025-02-21 DIAGNOSIS — Z79.4 TYPE 2 DIABETES MELLITUS WITH HYPERGLYCEMIA, WITH LONG-TERM CURRENT USE OF INSULIN (HCC): ICD-10-CM

## 2025-02-24 RX ORDER — INSULIN LISPRO 100 [IU]/ML
INJECTION, SOLUTION INTRAVENOUS; SUBCUTANEOUS
Qty: 15 ML | Refills: 0 | Status: SHIPPED | OUTPATIENT
Start: 2025-02-24

## 2025-03-07 DIAGNOSIS — E11.65 TYPE 2 DIABETES MELLITUS WITH HYPERGLYCEMIA, WITH LONG-TERM CURRENT USE OF INSULIN (HCC): ICD-10-CM

## 2025-03-07 DIAGNOSIS — Z79.4 TYPE 2 DIABETES MELLITUS WITH HYPERGLYCEMIA, WITH LONG-TERM CURRENT USE OF INSULIN (HCC): ICD-10-CM

## 2025-03-10 RX ORDER — LUBIPROSTONE 24 UG/1
24 CAPSULE ORAL 2 TIMES DAILY WITH MEALS
Qty: 56 CAPSULE | Refills: 0 | Status: SHIPPED | OUTPATIENT
Start: 2025-03-10

## 2025-03-10 RX ORDER — CALCIUM CARBONATE/VITAMIN D3 500 MG-2.5
1 TABLET,CHEWABLE ORAL 2 TIMES DAILY
Qty: 60 TABLET | Refills: 0 | Status: SHIPPED | OUTPATIENT
Start: 2025-03-10

## 2025-03-10 RX ORDER — CETIRIZINE HYDROCHLORIDE 10 MG/1
TABLET ORAL
Qty: 28 TABLET | Refills: 0 | Status: SHIPPED | OUTPATIENT
Start: 2025-03-10

## 2025-03-10 RX ORDER — SIMVASTATIN 10 MG
TABLET ORAL
Qty: 28 TABLET | Refills: 0 | Status: SHIPPED | OUTPATIENT
Start: 2025-03-10

## 2025-04-01 RX ORDER — PEN NEEDLE, DIABETIC 29 G X1/2"
NEEDLE, DISPOSABLE MISCELLANEOUS
Qty: 100 EACH | Refills: 0 | Status: SHIPPED | OUTPATIENT
Start: 2025-04-01

## 2025-04-14 RX ORDER — CHOLECALCIFEROL (VITAMIN D3) 25 MCG
1 TABLET ORAL DAILY
Qty: 28 TABLET | Refills: 0 | Status: SHIPPED | OUTPATIENT
Start: 2025-04-14

## 2025-04-14 RX ORDER — METOPROLOL SUCCINATE 25 MG/1
TABLET, EXTENDED RELEASE ORAL DAILY
Qty: 30 TABLET | Refills: 0 | Status: SHIPPED | OUTPATIENT
Start: 2025-04-14

## 2025-04-22 DIAGNOSIS — Z79.4 TYPE 2 DIABETES MELLITUS WITH HYPERGLYCEMIA, WITH LONG-TERM CURRENT USE OF INSULIN (HCC): ICD-10-CM

## 2025-04-22 DIAGNOSIS — E11.65 TYPE 2 DIABETES MELLITUS WITH HYPERGLYCEMIA, WITH LONG-TERM CURRENT USE OF INSULIN (HCC): ICD-10-CM

## 2025-04-22 RX ORDER — INSULIN GLARGINE 100 [IU]/ML
INJECTION, SOLUTION SUBCUTANEOUS
Qty: 15 ML | Refills: 0 | OUTPATIENT
Start: 2025-04-22

## 2025-04-29 ENCOUNTER — TELEPHONE (OUTPATIENT)
Facility: CLINIC | Age: 40
End: 2025-04-29

## 2025-04-29 NOTE — TELEPHONE ENCOUNTER
Attempted to contact patient regarding upcoming Medicare wellness appointment and completion of HRA questionnaire. LVM for patient to please return call at  [phone]

## 2025-04-30 ENCOUNTER — TELEPHONE (OUTPATIENT)
Facility: CLINIC | Age: 40
End: 2025-04-30

## 2025-04-30 NOTE — TELEPHONE ENCOUNTER
Attempted to call caretaker regarding apt on 5/1/25 needing to be r/s due to provider out however phone rang and no option to LVM.    Hashimoto's disease

## 2025-05-06 ENCOUNTER — TELEPHONE (OUTPATIENT)
Facility: CLINIC | Age: 40
End: 2025-05-06

## 2025-05-07 ENCOUNTER — TELEPHONE (OUTPATIENT)
Facility: CLINIC | Age: 40
End: 2025-05-07

## 2025-05-07 DIAGNOSIS — E11.65 TYPE 2 DIABETES MELLITUS WITH HYPERGLYCEMIA, WITH LONG-TERM CURRENT USE OF INSULIN (HCC): ICD-10-CM

## 2025-05-07 DIAGNOSIS — Z79.4 TYPE 2 DIABETES MELLITUS WITH HYPERGLYCEMIA, WITH LONG-TERM CURRENT USE OF INSULIN (HCC): ICD-10-CM

## 2025-05-07 RX ORDER — CETIRIZINE HYDROCHLORIDE 10 MG/1
TABLET ORAL
Qty: 28 TABLET | Refills: 0 | Status: SHIPPED | OUTPATIENT
Start: 2025-05-07

## 2025-05-07 RX ORDER — SIMVASTATIN 10 MG
TABLET ORAL
Qty: 28 TABLET | Refills: 0 | Status: SHIPPED | OUTPATIENT
Start: 2025-05-07

## 2025-05-07 RX ORDER — CALCIUM CARBONATE/VITAMIN D3 500 MG-2.5
1 TABLET,CHEWABLE ORAL 2 TIMES DAILY
Qty: 60 TABLET | Refills: 0 | Status: SHIPPED | OUTPATIENT
Start: 2025-05-07

## 2025-05-12 RX ORDER — LUBIPROSTONE 24 UG/1
24 CAPSULE ORAL 2 TIMES DAILY WITH MEALS
Qty: 56 CAPSULE | Refills: 0 | Status: SHIPPED | OUTPATIENT
Start: 2025-05-12

## 2025-05-20 ENCOUNTER — TELEPHONE (OUTPATIENT)
Facility: CLINIC | Age: 40
End: 2025-05-20

## 2025-05-21 ENCOUNTER — TELEPHONE (OUTPATIENT)
Facility: CLINIC | Age: 40
End: 2025-05-21

## 2025-05-21 ENCOUNTER — OFFICE VISIT (OUTPATIENT)
Facility: CLINIC | Age: 40
End: 2025-05-21
Payer: MEDICARE

## 2025-05-21 VITALS
BODY MASS INDEX: 37.05 KG/M2 | TEMPERATURE: 97.9 F | WEIGHT: 217 LBS | SYSTOLIC BLOOD PRESSURE: 113 MMHG | OXYGEN SATURATION: 99 % | RESPIRATION RATE: 18 BRPM | HEIGHT: 64 IN | DIASTOLIC BLOOD PRESSURE: 80 MMHG | HEART RATE: 83 BPM

## 2025-05-21 DIAGNOSIS — E11.65 TYPE 2 DIABETES MELLITUS WITH HYPERGLYCEMIA, WITH LONG-TERM CURRENT USE OF INSULIN (HCC): Primary | ICD-10-CM

## 2025-05-21 DIAGNOSIS — Z79.4 TYPE 2 DIABETES MELLITUS WITH HYPERGLYCEMIA, WITH LONG-TERM CURRENT USE OF INSULIN (HCC): Primary | ICD-10-CM

## 2025-05-21 PROCEDURE — 99213 OFFICE O/P EST LOW 20 MIN: CPT

## 2025-05-21 PROCEDURE — 3046F HEMOGLOBIN A1C LEVEL >9.0%: CPT

## 2025-05-21 PROCEDURE — 1036F TOBACCO NON-USER: CPT

## 2025-05-21 PROCEDURE — G8417 CALC BMI ABV UP PARAM F/U: HCPCS

## 2025-05-21 PROCEDURE — 2022F DILAT RTA XM EVC RTNOPTHY: CPT

## 2025-05-21 PROCEDURE — G8427 DOCREV CUR MEDS BY ELIG CLIN: HCPCS

## 2025-05-21 RX ORDER — TRAZODONE HYDROCHLORIDE 50 MG/1
TABLET ORAL
COMMUNITY
Start: 2025-03-24

## 2025-05-21 RX ORDER — LUMATEPERONE 42 MG/1
CAPSULE ORAL
COMMUNITY
Start: 2025-05-06

## 2025-05-21 SDOH — ECONOMIC STABILITY: FOOD INSECURITY: WITHIN THE PAST 12 MONTHS, YOU WORRIED THAT YOUR FOOD WOULD RUN OUT BEFORE YOU GOT MONEY TO BUY MORE.: NEVER TRUE

## 2025-05-21 SDOH — ECONOMIC STABILITY: FOOD INSECURITY: WITHIN THE PAST 12 MONTHS, THE FOOD YOU BOUGHT JUST DIDN'T LAST AND YOU DIDN'T HAVE MONEY TO GET MORE.: NEVER TRUE

## 2025-05-21 ASSESSMENT — PATIENT HEALTH QUESTIONNAIRE - PHQ9: DEPRESSION UNABLE TO ASSESS: FUNCTIONAL CAPACITY MOTIVATION LIMITS ACCURACY

## 2025-05-21 NOTE — PROGRESS NOTES
Chief Complaint   Patient presents with    Follow-up    Lab Collection    Diabetes     Patient presents in the office today for a f/u and labs.  Patient caregiver stated she would like to go over her sliding scale. Stated that she has been having hypoglycemia.   Patient house manger on phone, stated that they need a annual wellness to done.   No other concerns.    Have you been to the ER, urgent care clinic since your last visit?  Hospitalized since your last visit?   NO    Have you seen or consulted any other health care providers outside our system since your last visit?   NO    Have you had a mammogram?”   NO    No breast cancer screening on file      “Have you had a pap smear?”    NO    No cervical cancer screening on file       “Have you had a diabetic eye exam?”    NO     No diabetic eye exam on file

## 2025-05-21 NOTE — PROGRESS NOTES
Rubi Albert (:  1985) is a 39 y.o. female,Established patient, here for evaluation of the following chief complaint(s):  Follow-up, Lab Collection, and Diabetes         Assessment & Plan  Type 2 diabetes mellitus with hyperglycemia, with long-term current use of insulin (HCC)  This patient is clearly too complex for her current care situation and is in need of specialist care.   She is on high doses of insulin and caregivers report frequent (3-4 times per week) episodes of hypoglycemia resulting in glucose tabs to \"wake her up\".  Referral placed to endocrinology today.    Orders:    [facility] - Lina Ham MD, Endocrinology, [place]      Return in about 3 months (around 2025) for medicare annual wellness with fasting labs.       Subjective   The patient presents today for a medicare annual wellness visit. I have declined to perform this due to admin refusing to give me the time and resources needed to adequately perform and document such a visit. Instead, we discussed chronic and acute conditions:    Patient presents in the office today for a f/u and labs.  Patient caregiver stated she would like to go over her sliding scale. Stated that she has been having hypoglycemia.   Patient house manger on phone, stated that they need a annual wellness to done.   No other concerns.        Review of Systems   All other systems reviewed and are negative.         Objective   Physical Exam  Constitutional:       General: She is not in acute distress.     Appearance: Normal appearance. She is not ill-appearing.   HENT:      Head: Normocephalic and atraumatic.      Right Ear: External ear normal.      Left Ear: External ear normal.      Nose: Nose normal.   Eyes:      General: No scleral icterus.        Right eye: No discharge.         Left eye: No discharge.      Extraocular Movements: Extraocular movements intact.      Conjunctiva/sclera: Conjunctivae normal.   Pulmonary:      Effort: Pulmonary effort is

## 2025-05-21 NOTE — ASSESSMENT & PLAN NOTE
This patient is clearly too complex for her current care situation and is in need of specialist care.   She is on high doses of insulin and caregivers report frequent (3-4 times per week) episodes of hypoglycemia resulting in glucose tabs to \"wake her up\".  Referral placed to endocrinology today.    Orders:    [facility] Lina Pina MD, Endocrinology, [place]

## 2025-05-27 DIAGNOSIS — E11.65 TYPE 2 DIABETES MELLITUS WITH HYPERGLYCEMIA, WITH LONG-TERM CURRENT USE OF INSULIN (HCC): ICD-10-CM

## 2025-05-27 DIAGNOSIS — Z79.4 TYPE 2 DIABETES MELLITUS WITH HYPERGLYCEMIA, WITH LONG-TERM CURRENT USE OF INSULIN (HCC): ICD-10-CM

## 2025-05-27 RX ORDER — BISMUTH SUBSALICYLATE 262MG/15ML
SUSPENSION, ORAL (FINAL DOSE FORM) ORAL
Qty: 100 EACH | Refills: 0 | Status: SHIPPED | OUTPATIENT
Start: 2025-05-27

## 2025-05-27 RX ORDER — BLOOD SUGAR DIAGNOSTIC
STRIP MISCELLANEOUS
Qty: 100 STRIP | Refills: 0 | Status: SHIPPED | OUTPATIENT
Start: 2025-05-27

## 2025-06-02 RX ORDER — CHOLECALCIFEROL (VITAMIN D3) 25 MCG
1 TABLET ORAL DAILY
Qty: 28 TABLET | Refills: 0 | Status: SHIPPED | OUTPATIENT
Start: 2025-06-02

## 2025-06-02 RX ORDER — METOPROLOL SUCCINATE 25 MG/1
TABLET, EXTENDED RELEASE ORAL DAILY
Qty: 30 TABLET | Refills: 0 | Status: SHIPPED | OUTPATIENT
Start: 2025-06-02

## 2025-06-03 DIAGNOSIS — E11.65 TYPE 2 DIABETES MELLITUS WITH HYPERGLYCEMIA, WITH LONG-TERM CURRENT USE OF INSULIN (HCC): ICD-10-CM

## 2025-06-03 DIAGNOSIS — Z79.4 TYPE 2 DIABETES MELLITUS WITH HYPERGLYCEMIA, WITH LONG-TERM CURRENT USE OF INSULIN (HCC): ICD-10-CM

## 2025-06-03 RX ORDER — INSULIN GLARGINE 100 [IU]/ML
INJECTION, SOLUTION SUBCUTANEOUS
Qty: 15 ML | Refills: 0 | Status: SHIPPED | OUTPATIENT
Start: 2025-06-03

## 2025-06-10 DIAGNOSIS — Z79.4 TYPE 2 DIABETES MELLITUS WITH HYPERGLYCEMIA, WITH LONG-TERM CURRENT USE OF INSULIN (HCC): ICD-10-CM

## 2025-06-10 DIAGNOSIS — E11.65 TYPE 2 DIABETES MELLITUS WITH HYPERGLYCEMIA, WITH LONG-TERM CURRENT USE OF INSULIN (HCC): ICD-10-CM

## 2025-06-10 RX ORDER — LUBIPROSTONE 24 UG/1
24 CAPSULE ORAL 2 TIMES DAILY WITH MEALS
Qty: 56 CAPSULE | Refills: 0 | Status: SHIPPED | OUTPATIENT
Start: 2025-06-10

## 2025-06-10 RX ORDER — CETIRIZINE HYDROCHLORIDE 10 MG/1
TABLET ORAL
Qty: 28 TABLET | Refills: 0 | Status: SHIPPED | OUTPATIENT
Start: 2025-06-10

## 2025-06-10 RX ORDER — CALCIUM CARBONATE/VITAMIN D3 500 MG-2.5
1 TABLET,CHEWABLE ORAL 2 TIMES DAILY
Qty: 60 TABLET | Refills: 0 | Status: SHIPPED | OUTPATIENT
Start: 2025-06-10

## 2025-06-10 RX ORDER — SIMVASTATIN 10 MG
TABLET ORAL
Qty: 28 TABLET | Refills: 0 | Status: SHIPPED | OUTPATIENT
Start: 2025-06-10

## 2025-06-23 DIAGNOSIS — E11.65 TYPE 2 DIABETES MELLITUS WITH HYPERGLYCEMIA, WITH LONG-TERM CURRENT USE OF INSULIN (HCC): ICD-10-CM

## 2025-06-23 DIAGNOSIS — Z79.4 TYPE 2 DIABETES MELLITUS WITH HYPERGLYCEMIA, WITH LONG-TERM CURRENT USE OF INSULIN (HCC): ICD-10-CM

## 2025-06-23 RX ORDER — INSULIN LISPRO 100 [IU]/ML
INJECTION, SOLUTION INTRAVENOUS; SUBCUTANEOUS
Qty: 15 ML | Refills: 0 | Status: SHIPPED | OUTPATIENT
Start: 2025-06-23

## 2025-06-25 RX ORDER — PEN NEEDLE, DIABETIC 29 G X1/2"
NEEDLE, DISPOSABLE MISCELLANEOUS
Qty: 100 EACH | Refills: 0 | Status: SHIPPED | OUTPATIENT
Start: 2025-06-25

## 2025-07-15 RX ORDER — MEDROXYPROGESTERONE ACETATE 150 MG/ML
INJECTION, SUSPENSION INTRAMUSCULAR
Qty: 1 ML | Refills: 0 | Status: SHIPPED | OUTPATIENT
Start: 2025-07-15

## 2025-07-23 DIAGNOSIS — Z79.4 TYPE 2 DIABETES MELLITUS WITH HYPERGLYCEMIA, WITH LONG-TERM CURRENT USE OF INSULIN (HCC): ICD-10-CM

## 2025-07-23 DIAGNOSIS — E11.65 TYPE 2 DIABETES MELLITUS WITH HYPERGLYCEMIA, WITH LONG-TERM CURRENT USE OF INSULIN (HCC): ICD-10-CM

## 2025-07-23 RX ORDER — BLOOD SUGAR DIAGNOSTIC
STRIP MISCELLANEOUS
Qty: 100 STRIP | Refills: 0 | Status: SHIPPED | OUTPATIENT
Start: 2025-07-23

## 2025-07-23 RX ORDER — BISMUTH SUBSALICYLATE 262MG/15ML
SUSPENSION, ORAL (FINAL DOSE FORM) ORAL
Qty: 100 EACH | Refills: 0 | Status: SHIPPED | OUTPATIENT
Start: 2025-07-23

## 2025-08-06 RX ORDER — CHOLECALCIFEROL (VITAMIN D3) 25 MCG
1 TABLET ORAL DAILY
Qty: 28 TABLET | Refills: 0 | Status: SHIPPED | OUTPATIENT
Start: 2025-08-06

## 2025-08-06 RX ORDER — METOPROLOL SUCCINATE 25 MG/1
TABLET, EXTENDED RELEASE ORAL DAILY
Qty: 30 TABLET | Refills: 0 | Status: SHIPPED | OUTPATIENT
Start: 2025-08-06

## 2025-08-12 DIAGNOSIS — E11.65 TYPE 2 DIABETES MELLITUS WITH HYPERGLYCEMIA, WITH LONG-TERM CURRENT USE OF INSULIN (HCC): ICD-10-CM

## 2025-08-12 DIAGNOSIS — Z79.4 TYPE 2 DIABETES MELLITUS WITH HYPERGLYCEMIA, WITH LONG-TERM CURRENT USE OF INSULIN (HCC): ICD-10-CM

## 2025-08-13 RX ORDER — INSULIN GLARGINE 100 [IU]/ML
INJECTION, SOLUTION SUBCUTANEOUS
Qty: 15 ML | Refills: 0 | Status: SHIPPED | OUTPATIENT
Start: 2025-08-13

## 2025-08-19 RX ORDER — PEN NEEDLE, DIABETIC 32GX 5/32"
NEEDLE, DISPOSABLE MISCELLANEOUS
Qty: 100 EACH | Refills: 0 | Status: SHIPPED | OUTPATIENT
Start: 2025-08-19

## 2025-08-21 ENCOUNTER — TELEPHONE (OUTPATIENT)
Facility: CLINIC | Age: 40
End: 2025-08-21

## 2025-08-29 DIAGNOSIS — Z79.4 TYPE 2 DIABETES MELLITUS WITH HYPERGLYCEMIA, WITH LONG-TERM CURRENT USE OF INSULIN (HCC): ICD-10-CM

## 2025-08-29 DIAGNOSIS — E11.65 TYPE 2 DIABETES MELLITUS WITH HYPERGLYCEMIA, WITH LONG-TERM CURRENT USE OF INSULIN (HCC): ICD-10-CM

## 2025-09-02 DIAGNOSIS — Z79.4 TYPE 2 DIABETES MELLITUS WITH HYPERGLYCEMIA, WITH LONG-TERM CURRENT USE OF INSULIN (HCC): ICD-10-CM

## 2025-09-02 DIAGNOSIS — E11.65 TYPE 2 DIABETES MELLITUS WITH HYPERGLYCEMIA, WITH LONG-TERM CURRENT USE OF INSULIN (HCC): ICD-10-CM

## 2025-09-03 RX ORDER — CALCIUM CARBONATE/VITAMIN D3 500 MG-2.5
1 TABLET,CHEWABLE ORAL 2 TIMES DAILY
Qty: 60 TABLET | Refills: 0 | Status: SHIPPED | OUTPATIENT
Start: 2025-09-03

## 2025-09-03 RX ORDER — CETIRIZINE HYDROCHLORIDE 10 MG/1
TABLET ORAL
Qty: 28 TABLET | Refills: 0 | Status: SHIPPED | OUTPATIENT
Start: 2025-09-03

## 2025-09-03 RX ORDER — LUBIPROSTONE 0.02 MG/1
24 CAPSULE ORAL 2 TIMES DAILY WITH MEALS
Qty: 56 CAPSULE | Refills: 0 | Status: SHIPPED | OUTPATIENT
Start: 2025-09-03

## 2025-09-03 RX ORDER — INSULIN LISPRO 100 [IU]/ML
INJECTION, SOLUTION INTRAVENOUS; SUBCUTANEOUS
Qty: 15 ML | Refills: 0 | Status: SHIPPED | OUTPATIENT
Start: 2025-09-03

## 2025-09-03 RX ORDER — SIMVASTATIN 10 MG
TABLET ORAL
Qty: 28 TABLET | Refills: 0 | Status: SHIPPED | OUTPATIENT
Start: 2025-09-03